# Patient Record
Sex: MALE | Race: WHITE | NOT HISPANIC OR LATINO | Employment: FULL TIME | URBAN - METROPOLITAN AREA
[De-identification: names, ages, dates, MRNs, and addresses within clinical notes are randomized per-mention and may not be internally consistent; named-entity substitution may affect disease eponyms.]

---

## 2017-03-21 ENCOUNTER — GENERIC CONVERSION - ENCOUNTER (OUTPATIENT)
Dept: OTHER | Facility: OTHER | Age: 42
End: 2017-03-21

## 2017-10-02 ENCOUNTER — ALLSCRIPTS OFFICE VISIT (OUTPATIENT)
Dept: OTHER | Facility: OTHER | Age: 42
End: 2017-10-02

## 2017-10-03 NOTE — PROGRESS NOTES
Assessment  1  MDD (major depressive disorder) (296 20) (F32 9)    Plan  MDD (major depressive disorder)    · BusPIRone HCl - 15 MG Oral Tablet; Take 1 tablet daily   · Escitalopram Oxalate 10 MG Oral Tablet; Take 1 tablet daily   · Dolores Sorto LCSW (Licensed Clinical ) Co-Management  *  Status: Hold  For - Scheduling  Requested for: 74LVH6209  Care Summary provided  : Yes    Discussion/Summary    Will start lexapro and buspar interacts with trazodone and patient does not want to stop that  cxr at next visit  in 1 month  Chief Complaint  1  Depression  In deep Depression      History of Present Illness  HPI: has been having worsening depressionpast few eyars -- worse over past 6 monthshas been involved with chest tightness  on citalopram and buspar in the past which had helped  Review of Systems    Constitutional: no fever or chills, feels well, no tiredness, no recent weight loss or weight gain  ENT: no complaints of earache, no loss of hearing, no nosebleeds or nasal discharge, no sore throat or hoarseness  Cardiovascular: no complaints of slow or fast heart rate, no chest pain, no palpitations, no leg claudication or lower extremity edema  Respiratory: as noted in HPI  Gastrointestinal: no complaints of abdominal pain, no constipation, no nausea or vomiting, no diarrhea or bloody stools  Genitourinary: no complaints of dysuria or incontinence, no hesitancy, no nocturia, no genital lesion, no inadequacy of penile erection  Musculoskeletal: no complaints of arthralgia, no myalgia, no joint swelling or stiffness, no limb pain or swelling  Integumentary: no complaints of skin rash or lesion, no itching or dry skin, no skin wounds  Neurological: no complaints of headache, no confusion, no numbness or tingling, no dizziness or fainting  Active Problems  1  Adjustment disorder (309 9) (F43 20)   2  Bulging lumbar disc (722 10) (M51 26)   3   Chronic tension-type headache (339 12) (G44 229)   4  Grief reaction (309 0) (F43 20)   5  Hyperlipidemia (272 4) (E78 5)   6  Hypertension (401 9) (I10)   7  Insomnia (780 52) (G47 00)   8  Lateral epicondylitis of right elbow (726 32) (M77 11)   9  Myofascial pain syndrome (729 1) (M79 1)   10  Need for diphtheria-tetanus-pertussis (Tdap) vaccine (V06 1) (Z23)    Past Medical History  1  History of Herniated nucleus pulposus, L5-S1, right (722 10) (M51 27)   2  History of actinic keratosis (V13 3) (Z87 2)   3  History of low back pain (V13 59) (Z87 39)  Active Problems And Past Medical History Reviewed: The active problems and past medical history were reviewed and updated today  Family History  Mother    1  Family history of hypertension (V17 49) (Z82 49)  Brother    2  Family history of CHD (coronary heart disease)  Family History    3  Family history of Hypertension (V17 49)   4  Family history of Thyroid Cancer  Family History Reviewed: The family history was reviewed and updated today  Social History   · Being A Social Drinker   · Inadequate exercise (V69 0) (Z72 3)   · Marital History - Single   · Never A Smoker   · Working Full Time  The social history was reviewed and updated today  The social history was reviewed and is unchanged  Surgical History  Surgical History Reviewed: The surgical history was reviewed and updated today  Current Meds   1  ALPRAZolam 0 5 MG Oral Tablet; take 1-2 tabs as needed  twice daily; Therapy: 40Xfq7613 to (Last Rx:90Lkx5902) Ordered   2  Fenofibrate 145 MG Oral Tablet; Take 1 tablet daily; Therapy: 32Zut1541 to (Last Rx:14Yrw6416)  Requested for: 40Sku9406 Ordered   3  Lisinopril 20 MG Oral Tablet; TAKE 1 TABLET DAILY FOR BLOOD PRESSURE; Therapy: 16SJD5673 to (Evaluate:13Nov2017)  Requested for: 76PTF6807; Last   Rx:25Xrt7784 Ordered   4  TraMADol HCl - 50 MG Oral Tablet; TAKE 2 TABLETS 4 TIMES DAILY;    Therapy: 45RUJ2686 to (Evaluate:54Ppr0246)  Requested for: 25HDG1665; Last   E25MWQ0725 Ordered   5  TraZODone HCl - 50 MG Oral Tablet; two tablets at bedtime  Requested for: 94RFR5753;   Last Rx:75Mxi3655 Ordered   6  Zolpidem Tartrate 10 MG Oral Tablet; TAKE 1 TABLET AT BEDTIME AS NEEDED FOR   SLEEP; Therapy: 46IVN3214 to ([address])  Requested for: 08PHF8785; Last   Rx:61Tzf2150 Ordered    The medication list was reviewed and updated today  Allergies  1  No Known Drug Allergies    Vitals   Recorded: 89XZL7927 05:06PM   Heart Rate 60, R Radial   Pulse Quality Regular, R Radial   Systolic 144   Diastolic 96   Height 6 ft 6 in   Weight 262 lb    BMI Calculated 30 28   BSA Calculated 2 53     Physical Exam    Constitutional   General appearance: No acute distress, well appearing and well nourished  Ears, Nose, Mouth, and Throat   Otoscopic examination: Tympanic membrance translucent with normal light reflex  Canals patent without erythema  Oropharynx: Normal with no erythema, edema, exudate or lesions  Pulmonary   Respiratory effort: No increased work of breathing or signs of respiratory distress  Auscultation of lungs: Clear to auscultation, equal breath sounds bilaterally, no wheezes, no rales, no rhonci  Cardiovascular   Auscultation of heart: Normal rate and rhythm, normal S1 and S2, without murmurs  Abdomen   Abdomen: Non-tender, no masses  Liver and spleen: No hepatomegaly or splenomegaly  Lymphatic   Palpation of lymph nodes in neck: No lymphadenopathy  Musculoskeletal   Gait and station: Normal     Digits and nails: Normal without clubbing or cyanosis  Inspection/palpation of joints, bones, and muscles: Normal     Skin   Skin and subcutaneous tissue: Normal without rashes or lesions  Neurologic   Cranial nerves: Cranial nerves 2-12 intact      Psychiatric   Orientation to person, place and time: Normal     Mood and affect: Normal          Future Appointments    Date/Time Provider Specialty Site   2017 05:45 PM Paty Javed  Family Medicine [facility]     Signatures   Electronically signed by : Michael Cotto DO; Oct  2 2017  7:08PM EST                       (Author)

## 2017-10-05 ENCOUNTER — GENERIC CONVERSION - ENCOUNTER (OUTPATIENT)
Dept: OTHER | Facility: OTHER | Age: 42
End: 2017-10-05

## 2017-10-05 ENCOUNTER — ALLSCRIPTS OFFICE VISIT (OUTPATIENT)
Dept: OTHER | Facility: OTHER | Age: 42
End: 2017-10-05

## 2017-10-11 ENCOUNTER — GENERIC CONVERSION - ENCOUNTER (OUTPATIENT)
Dept: OTHER | Facility: OTHER | Age: 42
End: 2017-10-11

## 2017-10-11 ENCOUNTER — ALLSCRIPTS OFFICE VISIT (OUTPATIENT)
Dept: PSYCHOLOGY | Facility: CLINIC | Age: 42
End: 2017-10-11
Payer: COMMERCIAL

## 2017-10-11 PROCEDURE — G0176 OPPS/PHP;ACTIVITY THERAPY: HCPCS | Performed by: PSYCHIATRY & NEUROLOGY

## 2017-10-11 PROCEDURE — G0177 OPPS/PHP; TRAIN & EDUC SERV: HCPCS | Performed by: PSYCHIATRY & NEUROLOGY

## 2017-10-11 PROCEDURE — 90791 PSYCH DIAGNOSTIC EVALUATION: CPT | Performed by: PSYCHIATRY & NEUROLOGY

## 2017-10-11 PROCEDURE — G0410 GRP PSYCH PARTIAL HOSP 45-50: HCPCS | Performed by: PSYCHIATRY & NEUROLOGY

## 2017-10-12 ENCOUNTER — APPOINTMENT (OUTPATIENT)
Dept: PSYCHOLOGY | Facility: CLINIC | Age: 42
End: 2017-10-12
Payer: COMMERCIAL

## 2017-10-12 ENCOUNTER — GENERIC CONVERSION - ENCOUNTER (OUTPATIENT)
Dept: OTHER | Facility: OTHER | Age: 42
End: 2017-10-12

## 2017-10-12 PROCEDURE — G0176 OPPS/PHP;ACTIVITY THERAPY: HCPCS | Performed by: PSYCHIATRY & NEUROLOGY

## 2017-10-12 PROCEDURE — G0410 GRP PSYCH PARTIAL HOSP 45-50: HCPCS | Performed by: PSYCHIATRY & NEUROLOGY

## 2017-10-12 PROCEDURE — G0177 OPPS/PHP; TRAIN & EDUC SERV: HCPCS | Performed by: PSYCHIATRY & NEUROLOGY

## 2017-10-13 ENCOUNTER — APPOINTMENT (OUTPATIENT)
Dept: PSYCHOLOGY | Facility: CLINIC | Age: 42
End: 2017-10-13
Payer: COMMERCIAL

## 2017-10-13 ENCOUNTER — GENERIC CONVERSION - ENCOUNTER (OUTPATIENT)
Dept: OTHER | Facility: OTHER | Age: 42
End: 2017-10-13

## 2017-10-13 PROCEDURE — G0176 OPPS/PHP;ACTIVITY THERAPY: HCPCS | Performed by: PSYCHIATRY & NEUROLOGY

## 2017-10-13 PROCEDURE — G0177 OPPS/PHP; TRAIN & EDUC SERV: HCPCS | Performed by: PSYCHIATRY & NEUROLOGY

## 2017-10-13 PROCEDURE — G0410 GRP PSYCH PARTIAL HOSP 45-50: HCPCS | Performed by: PSYCHIATRY & NEUROLOGY

## 2017-10-14 ENCOUNTER — TRANSCRIBE ORDERS (OUTPATIENT)
Dept: LAB | Facility: CLINIC | Age: 42
End: 2017-10-14

## 2017-10-14 ENCOUNTER — APPOINTMENT (OUTPATIENT)
Dept: LAB | Facility: CLINIC | Age: 42
End: 2017-10-14
Payer: COMMERCIAL

## 2017-10-14 DIAGNOSIS — Z79.899 NEED FOR PROPHYLACTIC CHEMOTHERAPY: Primary | ICD-10-CM

## 2017-10-14 DIAGNOSIS — Z79.899 NEED FOR PROPHYLACTIC CHEMOTHERAPY: ICD-10-CM

## 2017-10-14 LAB
ALBUMIN SERPL BCP-MCNC: 4.2 G/DL (ref 3.5–5)
ALP SERPL-CCNC: 44 U/L (ref 46–116)
ALT SERPL W P-5'-P-CCNC: 33 U/L (ref 12–78)
ANION GAP SERPL CALCULATED.3IONS-SCNC: 5 MMOL/L (ref 4–13)
AST SERPL W P-5'-P-CCNC: 17 U/L (ref 5–45)
BILIRUB SERPL-MCNC: 0.44 MG/DL (ref 0.2–1)
BUN SERPL-MCNC: 13 MG/DL (ref 5–25)
CALCIUM SERPL-MCNC: 9 MG/DL (ref 8.3–10.1)
CHLORIDE SERPL-SCNC: 103 MMOL/L (ref 100–108)
CHOLEST SERPL-MCNC: 170 MG/DL (ref 50–200)
CO2 SERPL-SCNC: 27 MMOL/L (ref 21–32)
CREAT SERPL-MCNC: 1.28 MG/DL (ref 0.6–1.3)
GFR SERPL CREATININE-BSD FRML MDRD: 68 ML/MIN/1.73SQ M
GLUCOSE P FAST SERPL-MCNC: 99 MG/DL (ref 65–99)
HDLC SERPL-MCNC: 40 MG/DL (ref 40–60)
LDLC SERPL CALC-MCNC: 86 MG/DL (ref 0–100)
POTASSIUM SERPL-SCNC: 4.1 MMOL/L (ref 3.5–5.3)
PROT SERPL-MCNC: 7.8 G/DL (ref 6.4–8.2)
SODIUM SERPL-SCNC: 135 MMOL/L (ref 136–145)
TRIGL SERPL-MCNC: 222 MG/DL

## 2017-10-14 PROCEDURE — 80053 COMPREHEN METABOLIC PANEL: CPT

## 2017-10-14 PROCEDURE — 80061 LIPID PANEL: CPT

## 2017-10-14 PROCEDURE — 36415 COLL VENOUS BLD VENIPUNCTURE: CPT

## 2017-10-16 ENCOUNTER — GENERIC CONVERSION - ENCOUNTER (OUTPATIENT)
Dept: OTHER | Facility: OTHER | Age: 42
End: 2017-10-16

## 2017-10-16 ENCOUNTER — APPOINTMENT (OUTPATIENT)
Dept: PSYCHOLOGY | Facility: CLINIC | Age: 42
End: 2017-10-16
Payer: COMMERCIAL

## 2017-10-16 PROCEDURE — G0177 OPPS/PHP; TRAIN & EDUC SERV: HCPCS | Performed by: PSYCHIATRY & NEUROLOGY

## 2017-10-16 PROCEDURE — G0410 GRP PSYCH PARTIAL HOSP 45-50: HCPCS | Performed by: PSYCHIATRY & NEUROLOGY

## 2017-10-16 PROCEDURE — G0176 OPPS/PHP;ACTIVITY THERAPY: HCPCS | Performed by: PSYCHIATRY & NEUROLOGY

## 2017-10-17 ENCOUNTER — GENERIC CONVERSION - ENCOUNTER (OUTPATIENT)
Dept: OTHER | Facility: OTHER | Age: 42
End: 2017-10-17

## 2017-10-17 ENCOUNTER — APPOINTMENT (OUTPATIENT)
Dept: PSYCHOLOGY | Facility: CLINIC | Age: 42
End: 2017-10-17
Payer: COMMERCIAL

## 2017-10-17 PROCEDURE — G0410 GRP PSYCH PARTIAL HOSP 45-50: HCPCS | Performed by: PSYCHIATRY & NEUROLOGY

## 2017-10-17 PROCEDURE — G0177 OPPS/PHP; TRAIN & EDUC SERV: HCPCS | Performed by: PSYCHIATRY & NEUROLOGY

## 2017-10-17 PROCEDURE — G0176 OPPS/PHP;ACTIVITY THERAPY: HCPCS | Performed by: PSYCHIATRY & NEUROLOGY

## 2017-10-18 ENCOUNTER — GENERIC CONVERSION - ENCOUNTER (OUTPATIENT)
Dept: OTHER | Facility: OTHER | Age: 42
End: 2017-10-18

## 2017-10-18 ENCOUNTER — APPOINTMENT (OUTPATIENT)
Dept: PSYCHOLOGY | Facility: CLINIC | Age: 42
End: 2017-10-18
Payer: COMMERCIAL

## 2017-10-18 PROCEDURE — G0177 OPPS/PHP; TRAIN & EDUC SERV: HCPCS | Performed by: PSYCHIATRY & NEUROLOGY

## 2017-10-18 PROCEDURE — G0410 GRP PSYCH PARTIAL HOSP 45-50: HCPCS | Performed by: PSYCHIATRY & NEUROLOGY

## 2017-10-18 PROCEDURE — G0176 OPPS/PHP;ACTIVITY THERAPY: HCPCS | Performed by: PSYCHIATRY & NEUROLOGY

## 2017-10-19 ENCOUNTER — APPOINTMENT (OUTPATIENT)
Dept: PSYCHOLOGY | Facility: CLINIC | Age: 42
End: 2017-10-19
Payer: COMMERCIAL

## 2017-10-19 ENCOUNTER — GENERIC CONVERSION - ENCOUNTER (OUTPATIENT)
Dept: OTHER | Facility: OTHER | Age: 42
End: 2017-10-19

## 2017-10-19 PROCEDURE — G0410 GRP PSYCH PARTIAL HOSP 45-50: HCPCS | Performed by: PSYCHIATRY & NEUROLOGY

## 2017-10-19 PROCEDURE — G0177 OPPS/PHP; TRAIN & EDUC SERV: HCPCS | Performed by: PSYCHIATRY & NEUROLOGY

## 2017-10-19 PROCEDURE — G0176 OPPS/PHP;ACTIVITY THERAPY: HCPCS | Performed by: PSYCHIATRY & NEUROLOGY

## 2017-10-20 ENCOUNTER — GENERIC CONVERSION - ENCOUNTER (OUTPATIENT)
Dept: OTHER | Facility: OTHER | Age: 42
End: 2017-10-20

## 2017-10-20 ENCOUNTER — APPOINTMENT (OUTPATIENT)
Dept: PSYCHOLOGY | Facility: CLINIC | Age: 42
End: 2017-10-20
Payer: COMMERCIAL

## 2017-10-20 PROCEDURE — G0177 OPPS/PHP; TRAIN & EDUC SERV: HCPCS | Performed by: PSYCHIATRY & NEUROLOGY

## 2017-10-20 PROCEDURE — G0410 GRP PSYCH PARTIAL HOSP 45-50: HCPCS | Performed by: PSYCHIATRY & NEUROLOGY

## 2017-10-20 PROCEDURE — G0176 OPPS/PHP;ACTIVITY THERAPY: HCPCS | Performed by: PSYCHIATRY & NEUROLOGY

## 2017-10-23 ENCOUNTER — GENERIC CONVERSION - ENCOUNTER (OUTPATIENT)
Dept: OTHER | Facility: OTHER | Age: 42
End: 2017-10-23

## 2017-10-23 ENCOUNTER — APPOINTMENT (OUTPATIENT)
Dept: PSYCHOLOGY | Facility: CLINIC | Age: 42
End: 2017-10-23
Payer: COMMERCIAL

## 2017-10-23 PROCEDURE — G0176 OPPS/PHP;ACTIVITY THERAPY: HCPCS | Performed by: PSYCHIATRY & NEUROLOGY

## 2017-10-23 PROCEDURE — G0410 GRP PSYCH PARTIAL HOSP 45-50: HCPCS | Performed by: PSYCHIATRY & NEUROLOGY

## 2017-10-23 PROCEDURE — G0177 OPPS/PHP; TRAIN & EDUC SERV: HCPCS | Performed by: PSYCHIATRY & NEUROLOGY

## 2017-10-24 ENCOUNTER — GENERIC CONVERSION - ENCOUNTER (OUTPATIENT)
Dept: OTHER | Facility: OTHER | Age: 42
End: 2017-10-24

## 2017-10-24 ENCOUNTER — APPOINTMENT (OUTPATIENT)
Dept: PSYCHOLOGY | Facility: CLINIC | Age: 42
End: 2017-10-24
Payer: COMMERCIAL

## 2017-10-24 PROCEDURE — G0176 OPPS/PHP;ACTIVITY THERAPY: HCPCS | Performed by: PSYCHIATRY & NEUROLOGY

## 2017-10-24 PROCEDURE — G0410 GRP PSYCH PARTIAL HOSP 45-50: HCPCS | Performed by: PSYCHIATRY & NEUROLOGY

## 2017-10-24 PROCEDURE — G0177 OPPS/PHP; TRAIN & EDUC SERV: HCPCS | Performed by: PSYCHIATRY & NEUROLOGY

## 2017-10-25 ENCOUNTER — APPOINTMENT (OUTPATIENT)
Dept: PSYCHOLOGY | Facility: CLINIC | Age: 42
End: 2017-10-25
Payer: COMMERCIAL

## 2017-10-25 ENCOUNTER — GENERIC CONVERSION - ENCOUNTER (OUTPATIENT)
Dept: OTHER | Facility: OTHER | Age: 42
End: 2017-10-25

## 2017-10-25 PROCEDURE — S9480 INTENSIVE OUTPATIENT PSYCHIA: HCPCS | Performed by: PSYCHIATRY & NEUROLOGY

## 2017-10-26 ENCOUNTER — GENERIC CONVERSION - ENCOUNTER (OUTPATIENT)
Dept: OTHER | Facility: OTHER | Age: 42
End: 2017-10-26

## 2017-10-27 ENCOUNTER — GENERIC CONVERSION - ENCOUNTER (OUTPATIENT)
Dept: OTHER | Facility: OTHER | Age: 42
End: 2017-10-27

## 2017-10-30 ENCOUNTER — APPOINTMENT (OUTPATIENT)
Dept: PSYCHOLOGY | Facility: CLINIC | Age: 42
End: 2017-10-30
Payer: COMMERCIAL

## 2017-10-30 ENCOUNTER — GENERIC CONVERSION - ENCOUNTER (OUTPATIENT)
Dept: OTHER | Facility: OTHER | Age: 42
End: 2017-10-30

## 2017-10-30 PROCEDURE — S9480 INTENSIVE OUTPATIENT PSYCHIA: HCPCS | Performed by: PSYCHIATRY & NEUROLOGY

## 2017-10-31 ENCOUNTER — GENERIC CONVERSION - ENCOUNTER (OUTPATIENT)
Dept: OTHER | Facility: OTHER | Age: 42
End: 2017-10-31

## 2017-11-01 ENCOUNTER — APPOINTMENT (OUTPATIENT)
Dept: PSYCHOLOGY | Facility: CLINIC | Age: 42
End: 2017-11-01
Payer: COMMERCIAL

## 2017-11-01 ENCOUNTER — GENERIC CONVERSION - ENCOUNTER (OUTPATIENT)
Dept: OTHER | Facility: OTHER | Age: 42
End: 2017-11-01

## 2017-11-01 PROCEDURE — S9480 INTENSIVE OUTPATIENT PSYCHIA: HCPCS | Performed by: PSYCHIATRY & NEUROLOGY

## 2017-11-02 ENCOUNTER — GENERIC CONVERSION - ENCOUNTER (OUTPATIENT)
Dept: OTHER | Facility: OTHER | Age: 42
End: 2017-11-02

## 2017-11-03 ENCOUNTER — GENERIC CONVERSION - ENCOUNTER (OUTPATIENT)
Dept: OTHER | Facility: OTHER | Age: 42
End: 2017-11-03

## 2017-11-06 ENCOUNTER — ALLSCRIPTS OFFICE VISIT (OUTPATIENT)
Dept: OTHER | Facility: OTHER | Age: 42
End: 2017-11-06

## 2017-11-06 ENCOUNTER — GENERIC CONVERSION - ENCOUNTER (OUTPATIENT)
Dept: OTHER | Facility: OTHER | Age: 42
End: 2017-11-06

## 2017-11-07 ENCOUNTER — APPOINTMENT (OUTPATIENT)
Dept: PSYCHOLOGY | Facility: CLINIC | Age: 42
End: 2017-11-07
Payer: COMMERCIAL

## 2017-11-07 ENCOUNTER — GENERIC CONVERSION - ENCOUNTER (OUTPATIENT)
Dept: OTHER | Facility: OTHER | Age: 42
End: 2017-11-07

## 2017-11-07 PROCEDURE — S9480 INTENSIVE OUTPATIENT PSYCHIA: HCPCS | Performed by: PSYCHIATRY & NEUROLOGY

## 2017-11-07 NOTE — PROGRESS NOTES
Assessment  1  Bulging lumbar disc (722 10) (M51 26)   2  Major depressive disorder, recurrent, severe w/o psychotic behavior (296 33) (F33 2)   3  Hypertension (401 9) (I10)    Plan  Encounter for pre-operative cardiovascular clearance    · EKG/ECG- POC; Status:Complete;   Done: 37ULX6574 06:49PM  Hyperlipidemia    · Fenofibrate 145 MG Oral Tablet; Take 1 tablet daily  Hypertension    · Lisinopril 20 MG Oral Tablet; TAKE 1 TABLET DAILY FOR BLOOD PRESSURE  Insomnia    · TraZODone HCl - 50 MG Oral Tablet; two tablets at bedtime  Major depressive disorder, recurrent, severe w/o psychotic behavior    · BusPIRone HCl - 15 MG Oral Tablet; Take 1 tablet daily   · Escitalopram Oxalate 10 MG Oral Tablet; Take 1 tablet daily  PMH: History of low back pain    · TraMADol HCl - 50 MG Oral Tablet; Take 1 tablet 4 times daily    Discussion/Summary    Cleared for surgerycont current meds --without changesneed to consider sleep study after back pain  Chief Complaint  1 month follow up for depression   Patient is here today for follow up of chronic conditions described in HPI  History of Present Illness  here for f/u of depressionsomewhat [facility] psych appt till feb 2018less emotional and flat but able to functionback surgery in a few weeks his back pain is not helpinghere for pre-op clearance      Review of Systems    Constitutional: No fever or chills, feels well, no tiredness, no recent weight gain or weight loss  Eyes: No complaints of eye pain, no red eyes, no discharge from eyes, no itchy eyes  ENT: no complaints of earache, no hearing loss, no nosebleeds, no nasal discharge, no sore throat, no hoarseness  Cardiovascular: No complaints of slow heart rate, no fast heart rate, no chest pain, no palpitations, no leg claudication, no lower extremity  Respiratory: No complaints of shortness of breath, no wheezing, no cough, no SOB on exertion, no orthopnea or PND     Gastrointestinal: No complaints of abdominal pain, no constipation, no nausea or vomiting, no diarrhea or bloody stools  Genitourinary: No complaints of dysuria, no incontinence, no hesitancy, no nocturia, no genital lesion, no testicular pain  Musculoskeletal: as noted in HPI  Integumentary: No complaints of skin rash or skin lesions, no itching, no skin wound, no dry skin  Neurological: No compliants of headache, no confusion, no convulsions, no numbness or tingling, no dizziness or fainting, no limb weakness, no difficulty walking  Psychiatric: Is not suicidal, no sleep disturbances, no anxiety or depression, no change in personality, no emotional problems  Endocrine: No complaints of proptosis, no hot flashes, no muscle weakness, no erectile dysfunction, no deepening of the voice, no feelings of weakness  Hematologic/Lymphatic: No complaints of swollen glands, no swollen glands in the neck, does not bleed easily, no easy bruising  Active Problems  1  Bulging lumbar disc (722 10) (M51 26)   2  Chronic tension-type headache (339 12) (G44 229)   3  Generalized anxiety disorder (300 02) (F41 1)   4  Grief reaction (309 0) (F43 20)   5  High risk medication use (V58 69) (Z79 899)   6  Hyperlipidemia (272 4) (E78 5)   7  Hypertension (401 9) (I10)   8  Insomnia (780 52) (G47 00)   9  Lateral epicondylitis of right elbow (726 32) (M77 11)   10  Major depressive disorder, recurrent, severe w/o psychotic behavior (296 33) (F33 2)   11  Myofascial pain syndrome (729 1) (M79 1)    Past Medical History  1  History of Herniated nucleus pulposus, L5-S1, right (722 10) (M51 27)   2  History of actinic keratosis (V13 3) (Z87 2)   3  History of low back pain (V13 59) (Z87 39)   4  Denied: History of seizure   5  Denied: History of traumatic injury of head    The active problems and past medical history were reviewed and updated today  Surgical History    The surgical history was reviewed and updated today  Family History  Mother    1   Family history of hypertension (V17 49) (Z82 49)  Sister    2  Family history of Bipolar 1 disorder  Brother    3  Family history of CHD (coronary heart disease)  Family History    4  Family history of Hypertension (V17 49)   5  Family history of Thyroid Cancer    The family history was reviewed and updated today  Social History   · Being A Social Drinker   · Daily caffeine consumption   · Inadequate exercise (V69 0) (Z72 3)   ·    · Never A Smoker   · Working Full Time  The social history was reviewed and updated today  The social history was reviewed and is unchanged  Current Meds   1  ARIPiprazole 5 MG Oral Tablet; TAKE 1 TABLET DAILY; Therapy: 88ULK2074 to (Evaluate:10Nov2017)  Requested for: 60YWL2452; Last   Rx:11Oct2017 Ordered   2  BusPIRone HCl - 15 MG Oral Tablet; Take 1 tablet daily; Therapy: 63CWF3637 to (Lurdes Felt)  Requested for: 95RHN1862; Last   Rx:02Oct2017 Ordered   3  Escitalopram Oxalate 10 MG Oral Tablet; Take 1 tablet daily; Therapy: 60GDJ7756 to (Lurdes Felt)  Requested for: 02Oct2017; Last   Rx:02Oct2017 Ordered   4  Fenofibrate 145 MG Oral Tablet; Take 1 tablet daily; Therapy: 25Igf9355 to (Last Rx:65Omf0617)  Requested for: 88Xoc4561 Ordered   5  Lisinopril 20 MG Oral Tablet; TAKE 1 TABLET DAILY FOR BLOOD PRESSURE; Therapy: 60SDK3336 to (Evaluate:13Nov2017)  Requested for: 42YTF3864; Last   Rx:29Zad1970 Ordered   6  TraMADol HCl - 50 MG Oral Tablet; Take 1 tablet 4 times daily; Therapy: 60ZDV5627 to (Heck Blacknuvia)  Requested for: 47TND9505; Last   Rx:10Oct2017 Ordered   7  TraZODone HCl - 50 MG Oral Tablet; two tablets at bedtime  Requested for: 22NQE5120;   Last Rx:03Fri9677 Ordered   8  Zolpidem Tartrate 10 MG Oral Tablet; TAKE 1 TABLET AT BEDTIME; Therapy: 52NIV2681 to (Evaluate:54Oiy9546); Last Rx:10Oct2017 Ordered    The medication list was reviewed and updated today  Allergies  1   No Known Drug Allergies    Vitals  Vital Signs Recorded: 04AQC5275 05:49PM   Temperature 98 2 F, Tympanic   Heart Rate 66   Respiration 17   Systolic 020, RUE, Sitting   Diastolic 70, RUE, Sitting   BP CUFF SIZE Large   Height 6 ft 6 in   Weight 260 lb    BMI Calculated 30 05   BSA Calculated 2 52     Physical Exam    Constitutional   General appearance: No acute distress, well appearing and well nourished  Ears, Nose, Mouth, and Throat   Otoscopic examination: Tympanic membrance translucent with normal light reflex  Canals patent without erythema  Oropharynx: Normal with no erythema, edema, exudate or lesions  Pulmonary   Respiratory effort: No increased work of breathing or signs of respiratory distress  Auscultation of lungs: Clear to auscultation, equal breath sounds bilaterally, no wheezes, no rales, no rhonci  Cardiovascular   Auscultation of heart: Normal rate and rhythm, normal S1 and S2, without murmurs  Carotid pulses: Normal     Abdomen   Abdomen: Non-tender, no masses  Liver and spleen: No hepatomegaly or splenomegaly  Lymphatic   Palpation of lymph nodes in neck: No lymphadenopathy  Musculoskeletal   Gait and station: Normal     Digits and nails: Normal without clubbing or cyanosis  Inspection/palpation of joints, bones, and muscles: Normal     Skin   Skin and subcutaneous tissue: Normal without rashes or lesions  Neurologic   Cranial nerves: Cranial nerves 2-12 intact  Sensation: No sensory loss  Psychiatric   Orientation to person, place and time: Normal          Results/Data  EKG/ECG- POC 15GSL7526 06:49PM Grzegorz Ashford     Test Name Result Flag Reference   EKG/ECG 11/06/2017       Summary / No summary entered :      No summary entered  Documents attached :      Leonie Mcfarland ECG - Jaylen Smith [facility]; Enc: [id number] - Appointment - Grzegorz Ashford - (Family      Medicine) (Result Document)    Future Appointments    Date/Time Provider Specialty Site   11/07/2017 12:15 PM Jose Juan Rodriges M D   [facility]   02/21/2018 10:00 AM Mariama Sanchez DO Psychiatry [facility]   01/08/2018 03:30 PM Laura Dennis DO Family Medicine [facility]     Signatures   Electronically signed by : Jovany Shepherd DO; Nov 6 2017  8:56PM EST                       (Author)

## 2017-11-08 ENCOUNTER — GENERIC CONVERSION - ENCOUNTER (OUTPATIENT)
Dept: OTHER | Facility: OTHER | Age: 42
End: 2017-11-08

## 2017-11-09 ENCOUNTER — GENERIC CONVERSION - ENCOUNTER (OUTPATIENT)
Dept: OTHER | Facility: OTHER | Age: 42
End: 2017-11-09

## 2017-11-09 ENCOUNTER — APPOINTMENT (OUTPATIENT)
Dept: PSYCHOLOGY | Facility: CLINIC | Age: 42
End: 2017-11-09
Payer: COMMERCIAL

## 2017-11-09 PROCEDURE — S9480 INTENSIVE OUTPATIENT PSYCHIA: HCPCS | Performed by: PSYCHIATRY & NEUROLOGY

## 2017-12-06 ENCOUNTER — ALLSCRIPTS OFFICE VISIT (OUTPATIENT)
Dept: OTHER | Facility: OTHER | Age: 42
End: 2017-12-06

## 2017-12-13 ENCOUNTER — APPOINTMENT (OUTPATIENT)
Dept: PHYSICAL THERAPY | Facility: CLINIC | Age: 42
End: 2017-12-13
Payer: COMMERCIAL

## 2017-12-13 PROCEDURE — 97161 PT EVAL LOW COMPLEX 20 MIN: CPT

## 2017-12-13 PROCEDURE — G8991 OTHER PT/OT GOAL STATUS: HCPCS

## 2017-12-13 PROCEDURE — 97110 THERAPEUTIC EXERCISES: CPT

## 2017-12-13 PROCEDURE — G8990 OTHER PT/OT CURRENT STATUS: HCPCS

## 2017-12-14 ENCOUNTER — ALLSCRIPTS OFFICE VISIT (OUTPATIENT)
Dept: OTHER | Facility: OTHER | Age: 42
End: 2017-12-14

## 2017-12-19 ENCOUNTER — APPOINTMENT (OUTPATIENT)
Dept: PHYSICAL THERAPY | Facility: CLINIC | Age: 42
End: 2017-12-19
Payer: COMMERCIAL

## 2017-12-19 ENCOUNTER — GENERIC CONVERSION - ENCOUNTER (OUTPATIENT)
Dept: FAMILY MEDICINE CLINIC | Facility: CLINIC | Age: 42
End: 2017-12-19

## 2017-12-21 ENCOUNTER — APPOINTMENT (OUTPATIENT)
Dept: PHYSICAL THERAPY | Facility: CLINIC | Age: 42
End: 2017-12-21
Payer: COMMERCIAL

## 2017-12-21 PROCEDURE — 97110 THERAPEUTIC EXERCISES: CPT

## 2017-12-21 PROCEDURE — 97140 MANUAL THERAPY 1/> REGIONS: CPT

## 2017-12-26 ENCOUNTER — APPOINTMENT (OUTPATIENT)
Dept: PHYSICAL THERAPY | Facility: CLINIC | Age: 42
End: 2017-12-26
Payer: COMMERCIAL

## 2018-01-08 ENCOUNTER — ALLSCRIPTS OFFICE VISIT (OUTPATIENT)
Dept: OTHER | Facility: OTHER | Age: 43
End: 2018-01-08

## 2018-01-09 NOTE — PSYCH
History of Present Illness  [facility] Clinical Progress Note [facility]:   Specialized Services Documentation - Therapist must complete separate progress note for each specific clinical activity in which the client participated during the day  (601) Group Psychotherapy: 5799-2857  Tyree Toth participated in psychotherapy group that focused on creating positive life changes and how to move forward  Tyree Toth actively engaged in group discussion, attentive and supportive to peers  Tyree Toth discussed an overwhelming feeling when trying to make life changes, comparing it to a "car engine" and "tire blowout " He discussed his awareness with the need for change and struggling motivation to do so  Mild progress made towards goals  Continue psychotherapy group to encourage Tyree Toth to explore positive ways of coping  Grayson Lunsford MSW Intern  Treatment Plan Problem(s): 1 1, 1 2, 1 4  [facility]     (898) Group Psychotherapy: 0576-3540 Tyree Toth actively shared in psychotherapy group focused on challenging ineffective perceptions  He engaged in painting task, bert analysis, and discussion  Group was introduced to [facility]  Group explored balancing dialectics to understand if a perception is helpful or hurtful  Tyree Toth was able to identify beliefs as something that changes our perception  Some progress towards goal observed and shared  Continue psychotherapy to further challenge hurtful perceptions  Carlos Manuel Males, MTI  Treatment Plan Problem(s): 1 1, 1 2  Doyce Clubs, [facility]       (554) Education Therapy Goals set - Relax    Treatment Plan Problem(s): 1 1, 1 2  Education Therapy Time - 0900 - 0930 Previous goal was met  Readiness to Learning:  He is receptive to learning  There are  no barriers to learning  Learning Assessment Time - 1330 - 1400   Education completed on  illness and wellness tools  The teaching method was  verbal, written and demonstration   Shared area of learning: Yes     CHAYA Choe MT-BC     (938) Allied Therapy 5810-7119 Boy Szymanski actively shared in [facility] group focused on managing moods  He engaged in bert analysis and instrument improvisation  Group explored ISO principle on how to use music to help manage moods  Group also explored different ways to go from an ineffective mood to an effective middle ground  Boy Szymanski shared that when he is feeling low and in an ineffective mood, he tends to move up high too quickly  Using the mantra âslow up, slow downâ was discussed  Some good progress towards goal observed and shared  Continue AT to further encourage the exploration of managing moods to promote recovery  CHAYA Choe  Treatment Plan Problem(s): 1 1, 1 2  JOVANY Ch       Case Management Note:   [id number] Met with Boy Szymanski  Reviewed paperwork which will be completed and faxed by tomorrow  He reports continued distress at home with his nephew - discussed limits and boundary settings  He recognizes what he may need to do yet struggles with guilt  Discussed self-care  TREATMENT SESSION NUMBER: 5   Current suicide risk is low  Medications not changed/added/denied  JOVANY Ch      Active Problems    1  Bulging lumbar disc (722 10) (M51 26)   2  Chronic tension-type headache (339 12) (G44 229)   3  Generalized anxiety disorder (300 02) (F41 1)   4  Grief reaction (309 0) (F43 20)   5  High risk medication use (V58 69) (Z79 899)   6  Hyperlipidemia (272 4) (E78 5)   7  Hypertension (401 9) (I10)   8  Insomnia (780 52) (G47 00)   9  Lateral epicondylitis of right elbow (726 32) (M77 11)   10  Major depressive disorder, recurrent, severe w/o psychotic behavior (296 33) (F33 2)   11  Myofascial pain syndrome (729 1) (M79 1)   12  Need for diphtheria-tetanus-pertussis (Tdap) vaccine (V06 1) (Z23)    Past Medical History    1  History of Herniated nucleus pulposus, L5-S1, right (722 10) (M51 27)   2   History of actinic keratosis (V13 3) (Z87 2)   3  History of low back pain (V13 59) (Z87 39)   4  Denied: History of seizure   5  Denied: History of traumatic injury of head    Allergies    1  No Known Drug Allergies    Current Meds   1  Abilify 5 MG Oral Tablet; Therapy: (Recorded:11Oct2017) to Recorded   2  ARIPiprazole 5 MG Oral Tablet; TAKE 1 TABLET DAILY; Therapy: 71AZL7217 to (Evaluate:10Nov2017)  Requested for: 74EKT8530; Last   Rx:11Oct2017 Ordered   3  BusPIRone HCl - 15 MG Oral Tablet; Take 1 tablet daily; Therapy: 75FLN5508 to ([employer] F[place])  Requested for: 02Oct2017; Last   Rx:02Oct2017 Ordered   4  Escitalopram Oxalate 10 MG Oral Tablet; Take 1 tablet daily; Therapy: 17ZPS5336 to (oGld Failing)  Requested for: 02Oct2017; Last   Rx:02Oct2017 Ordered   5  Fenofibrate 145 MG Oral Tablet; Take 1 tablet daily; Therapy: 90Uqh5747 to (Last Rx:14Gcn5302)  Requested for: 90Lfp9878 Ordered   6  Lisinopril 20 MG Oral Tablet; TAKE 1 TABLET DAILY FOR BLOOD PRESSURE; Therapy: 13ZHT9226 to (Evaluate:13Nov2017)  Requested for: 34CEV6616; Last   Rx:74Vxo4907 Ordered   7  TraMADol HCl - 50 MG Oral Tablet; Take 1 tablet 4 times daily; Therapy: 93JLI6334 to ([place])  Requested for: 63FTO2910; Last   Rx:10Oct2017 Ordered   8  TraZODone HCl - 50 MG Oral Tablet; two tablets at bedtime  Requested for: 68NFV2006;   Last Rx:84Mgr0416 Ordered   9  Zolpidem Tartrate 10 MG Oral Tablet; TAKE 1 TABLET AT BEDTIME; Therapy: 62CRB7846 to (Evaluate:75Ohq6051); Last Rx:10Oct2017 Ordered    Family Psych History  Mother    1  Family history of hypertension (V17 49) (Z82 49)  Sister    2  Family history of Bipolar 1 disorder  Brother    3  Family history of CHD (coronary heart disease)  Family History    4  Family history of Hypertension (V17 49)   5   Family history of Thyroid Cancer    Social History    · Being A Social Drinker   · Daily caffeine consumption   · Inadequate exercise (V69 0) (Z72 3)   ·    · Never A Smoker   · Working Full Time    Future Appointments    Date/Time Provider Specialty Site   10/18/2017 11:15 AM ALEXX Nowak  Psychiatry [facility]   10/19/2017 11:00 AM ALEXX Nowak  Psychiatry [facility]   10/20/2017 11:00 AM ALEXX Nowak   [facility]iatry [facility]   11/06/2017 05:45 PM Yvette Dennis,  Family Medicine [facility] [place]     Signatures   Electronically signed by : MARTINEZ Reyes; Oct 17 2017  2:21PM EST                       (Author)    Electronically signed by : CHAYA Dumont; Oct 17 2017  3:25PM EST                       (Author)    Electronically signed by : JOVANY Cosme; Oct 17 2017  3:34PM EST                       (Author)    Electronically signed by : MARTINEZ Jurado; Oct 18 2017  7:53AM EST                       (Author)    Electronically signed by : Erich Maza LCSW; Oct 18 2017  4:04PM EST                       (Author)

## 2018-01-09 NOTE — PROGRESS NOTES
Assessment   1  Numbness (782 0) (R20 0)   2  Bulging lumbar disc (722 10) (M51 26)   3  Major depressive disorder, recurrent, severe w/o psychotic behavior (296 33) (F33 2)    Plan   Bulging lumbar disc, Myofascial pain syndrome    · From  TraMADol HCl - 50 MG Oral Tablet TAKE 2 TABLETS 4 TIMES DAILY To    TraMADol HCl - 50 MG Oral Tablet TAKE 2 TABLET Every 4 hours  Numbness    · Gabapentin 300 MG Oral Capsule; TAKE 1 CAPSULE TWICE DAILY   · EMG TWO EXTREMITIES WITH OR W/O RELATED PARASPINAL AREAS; Status:Hold For    - Scheduling; Requested for:08Jan2018;   TWO : RLE  ONE : LLE    Discussion/Summary      EMG ordered start gabapentin 300 milligrams twice a day other medications tramadol as he was usually getting 90 days 2 pills every 4 hours in 4 months with spine specialist       Chief Complaint   follow up for depression, bulging lumbar disc, HTN  Patient is here today for follow up of chronic conditions described in HPI  History of Present Illness   here for 2 month checkup having back pain -- has been seeing spine specialist he has been stable meds are working and does not request any dosing were med changes still having numbness in legs and feet of unknown origin discussed this with the spine specialist and recommended gabapentin which he would like to start today      Review of Systems        Constitutional: No fever or chills, feels well, no tiredness, no recent weight gain or weight loss  Eyes: No complaints of eye pain, no red eyes, no discharge from eyes, no itchy eyes  ENT: no complaints of earache, no hearing loss, no nosebleeds, no nasal discharge, no sore throat, no hoarseness  Cardiovascular: No complaints of slow heart rate, no fast heart rate, no chest pain, no palpitations, no leg claudication, no lower extremity  Respiratory: No complaints of shortness of breath, no wheezing, no cough, no SOB on exertion, no orthopnea or PND        Gastrointestinal: No complaints of abdominal pain, no constipation, no nausea or vomiting, no diarrhea or bloody stools  Genitourinary: No complaints of dysuria, no incontinence, no hesitancy, no nocturia, no genital lesion, no testicular pain  Musculoskeletal: as noted in HPI  Integumentary: No complaints of skin rash or skin lesions, no itching, no skin wound, no dry skin  Neurological: No compliants of headache, no confusion, no convulsions, no numbness or tingling, no dizziness or fainting, no limb weakness, no difficulty walking  Psychiatric: Is not suicidal, no sleep disturbances, no anxiety or depression, no change in personality, no emotional problems  Endocrine: No complaints of proptosis, no hot flashes, no muscle weakness, no erectile dysfunction, no deepening of the voice, no feelings of weakness  Hematologic/Lymphatic: No complaints of swollen glands, no swollen glands in the neck, does not bleed easily, no easy bruising  Active Problems   1  Bulging lumbar disc (722 10) (M51 26)   2  Chronic tension-type headache (339 12) (G44 229)   3  Generalized anxiety disorder (300 02) (F41 1)   4  Grief reaction (309 0) (F43 20)   5  High risk medication use (V58 69) (Z79 899)   6  Hyperlipidemia (272 4) (E78 5)   7  Hypertension (401 9) (I10)   8  Insomnia (780 52) (G47 00)   9  Lateral epicondylitis of right elbow (726 32) (M77 11)   10  Major depressive disorder, recurrent, severe w/o psychotic behavior (296 33) (F33 2)   11  Myofascial pain syndrome (729 1) (M79 1)    Past Medical History   1  History of Herniated nucleus pulposus, L5-S1, right (722 10) (M51 27)   2  History of actinic keratosis (V13 3) (Z87 2)   3  History of low back pain (V13 59) (Z87 39)   4  Denied: History of seizure   5  Denied: History of traumatic injury of head     The active problems and past medical history were reviewed and updated today  Surgical History      The surgical history was reviewed and updated today  Family History   Mother    1  Family history of hypertension (V17 49) (Z82 49)  Sister    2  Family history of Bipolar 1 disorder  Brother    3  Family history of CHD (coronary heart disease)  Family History    4  Family history of Hypertension (V17 49)   5  Family history of Thyroid Cancer     The family history was reviewed and updated today  Social History    · Being A Social Drinker   · Daily caffeine consumption   · Inadequate exercise (V69 0) (Z72 3)   ·    · Never A Smoker   · Working Full Time  The social history was reviewed and updated today  The social history was reviewed and is unchanged  Current Meds    1  ARIPiprazole 5 MG Oral Tablet; TAKE 1 TABLET DAILY; Therapy: 11QMW1190 to (Evaluate:10Nov2017)  Requested for: 60YBT1795; Last     Rx:11Oct2017; Status: ACTIVE - Renewal Denied Ordered   2  BusPIRone HCl - 15 MG Oral Tablet; Take 1 tablet daily; Therapy: 06MKS6769 to (Concha Quezada)  Requested for: 77UMM2497; Last     Rx:06Nov2017; Status: ACTIVE - Renewal Denied Ordered   3  Escitalopram Oxalate 10 MG Oral Tablet; Take 1 tablet daily; Therapy: 65RXI2487 to (Katie Addison)  Requested for: 94BHU5000; Last     Rx:28Nov2017 Ordered   4  Fenofibrate 145 MG Oral Tablet; Take 1 tablet daily; Therapy: 32YBQ6182 to (Last Rx:94Wnl7460)  Requested for: 33ELY5552 Ordered   5  Lisinopril 20 MG Oral Tablet; TAKE 1 TABLET DAILY FOR BLOOD PRESSURE; Therapy: 28Apr2016 to (Concha Quezada)  Requested for: 13ILG6157; Last     Rx:06Nov2017 Ordered   6  TraMADol HCl - 50 MG Oral Tablet; TAKE 2 TABLETS 4 TIMES DAILY; Therapy: (Recorded:12Bsb6329) to Recorded   7  TraZODone HCl - 50 MG Oral Tablet; two tablets at bedtime  Requested for: 79AFV3493;     Last Rx:06Nov2017 Ordered   8  Zolpidem Tartrate 10 MG Oral Tablet; TAKE 1 TABLET AT BEDTIME; Therapy: 84HVE1997 to (Evaluate:79Ozl6530);  Last Rx:10Oct2017 Ordered     The medication list was reviewed and updated today  Allergies   1  No Known Drug Allergies    Vitals   Vital Signs    Recorded: 31TBJ3073 03:30PM   Heart Rate 74   Respiration 16   Systolic 231, RUE, Sitting   Diastolic 74, RUE, Sitting   Height 6 ft 6 in   Weight 260 lb    BMI Calculated 30 05   BSA Calculated 2 52     Physical Exam        Constitutional      General appearance: No acute distress, well appearing and well nourished  Ears, Nose, Mouth, and Throat      Otoscopic examination: Tympanic membrance translucent with normal light reflex  Canals patent without erythema  Oropharynx: Normal with no erythema, edema, exudate or lesions  Pulmonary      Respiratory effort: No increased work of breathing or signs of respiratory distress  Auscultation of lungs: Clear to auscultation, equal breath sounds bilaterally, no wheezes, no rales, no rhonci  Cardiovascular      Auscultation of heart: Normal rate and rhythm, normal S1 and S2, without murmurs  Carotid pulses: Normal        Abdomen      Abdomen: Non-tender, no masses  Liver and spleen: No hepatomegaly or splenomegaly  Lymphatic      Palpation of lymph nodes in neck: No lymphadenopathy  Musculoskeletal      Gait and station: Normal        Digits and nails: Normal without clubbing or cyanosis  Inspection/palpation of joints, bones, and muscles: Normal        Skin      Skin and subcutaneous tissue: Normal without rashes or lesions  Neurologic      Cranial nerves: Cranial nerves 2-12 intact  Sensation: Abnormal  -- Decreased feeling in both lower extremities        Psychiatric      Orientation to person, place and time: Normal        Mood and affect: Normal           Future Appointments      Date/Time Provider Specialty Site   02/21/2018 10:00 AM Leonel Kathleen DO Psychiatry [facility] 81     Signatures    Electronically signed by : Jovany Butler DO; Jan 8 2018  4:40PM EST                       (Author)

## 2018-01-10 NOTE — PSYCH
History of Present Illness  [facility] Clinical Progress Note [facility]:   Specialized Services Documentation - Therapist must complete separate progress note for each specific clinical activity in which the client participated during the day  (314) Group Psychotherapy: 0982-1519  Esteban Ludwig attended psychotherapy group that focused on healthy boundaries and communication with supports  He did not participate in group discussion, but was attentive to peers throughout the hour  No outward progress made towards goals  Continue psychotherapy group to explore stressors as well as effective and appropriate ways to communicate with supports  MARIA DE JESUS Caruso Intern  Treatment Plan Problem(s): 1 2  Amador Rodriguez [place], [place]     (541) Group Psychotherapy: [id number] Esteban Ludwig actively participated in wellness group focused on working on recovery from mental illness; anticipating potential triggers that could cause relapses and identifying healthy coping strategies to replace unhealthy strategies  Esteban Ludwig was attentive to explanation of relapse prevention plan explanation and S/S of early relapse  He was able to identify his own S/S of relapse from experience he stated  He was supportive to peers who also shared their own relapse fears and strategies to prevent same  Esteban Ludwig made good progress toward goals  Continue group to educate patient on the recovery process visualized as a road to better wellness and how to [facility] on or to get back onâ the road to recovery and overcome obstacles along the way  Treatment Plan Problem(s): 1 1,1 2  Uziel Garcia RN       (005) Education Therapy Goals set - Work on Shashank & Jaylyn Problem(s): 1 1, 1 6  Education Therapy Time - 0900 - 0930 Previous goal was met  Readiness to Learning:  He is receptive to learning  There are  no barriers to learning  Learning Assessment Time - 1330 - 1400   Education completed on  illness and wellness tools     The teaching method was verbal and demonstration  Shared area of learning: Yes  Mort Console, MTI  JOVANY Brown     (070) Allied Therapy 1341-5903 Sharri Harris actively shared in [facility] group focused on stages of change and skills to solve problems  He shared during bert analysis and in movement task  He was encouraged to explore what may interfere with change behavior and specific goals to work toward in treatment  He shared a desire to continue to work on strengthening boundaries  Khaipriscila Harris was able to share a positive change he made in the past (moving from the [employer] to [employer])  He participated in â[employer] and [address] to reinforce options related to change  Some effort toward goal noted  Continue to encourage active work on goal development and skills to achieve  Treatment Plan Problem(s): 1 2, 1 5  JOVANY Brown     ( ) Other 0800 VM on this CM's phone from [facility] that two additional days of IOP 11/7 and 11/9 were authorized, with same auth  number, to be extended for D/C on 11/9/17  Sharri Harris was informed of same  Paperwork for [employer] employer[employer] was completed and signed by Dr Anna Kang MD and given today to Sharri Harris  He will forward to his company to confirm that his treatment has been ongoing in mental health PHP/IOP at [facility] RN     Case Management Note:   14/ IOP#8 [id number] Sharri Harris met with this CM to review progress in Program and upcoming D/C  He stated that he has made progress toward his treatment goals of setting healthy boundaries at home but this requires a lot of reinforcing  Spoke about his "guilt" of saying "no" to others in his home such as lending his computer to his nephew's girlfriend and other ways he is feeling guilty when he is taking care of himself  He is still struggling with being seen in public by fellow employees and having others  him   He is receiving support from his partner regarding this challenge but he grapples still with putting others first and feeling badly about taking care of himself  Lauren Glaser stated that pain from back has been increasing and he has been preparing for back surgery later this month that he has been putting off since last year  Lauren Glaser denied SI and HI  He denied any side effects from medications  TREATMENT SESSION NUMBER: 14   Current suicide risk is low  Medications not changed/added/denied  Erenest Baumgarten, RN      Active Problems    1  Bulging lumbar disc (722 10) (M51 26)   2  Chronic tension-type headache (339 12) (G44 229)   3  Encounter for pre-operative cardiovascular clearance (V72 81) (Z01 810)   4  Generalized anxiety disorder (300 02) (F41 1)   5  Grief reaction (309 0) (F43 20)   6  High risk medication use (V58 69) (Z79 899)   7  Hyperlipidemia (272 4) (E78 5)   8  Hypertension (401 9) (I10)   9  Insomnia (780 52) (G47 00)   10  Lateral epicondylitis of right elbow (726 32) (M77 11)   11  Major depressive disorder, recurrent, severe w/o psychotic behavior (296 33) (F33 2)   12  Myofascial pain syndrome (729 1) (M79 1)    Past Medical History    1  History of Herniated nucleus pulposus, L5-S1, right (722 10) (M51 27)   2  History of actinic keratosis (V13 3) (Z87 2)   3  History of low back pain (V13 59) (Z87 39)   4  Denied: History of seizure   5  Denied: History of traumatic injury of head    Allergies    1  No Known Drug Allergies    Current Meds   1  ARIPiprazole 5 MG Oral Tablet; TAKE 1 TABLET DAILY; Therapy: 99GCT3476 to (Evaluate:10Nov2017)  Requested for: 28AKW0927; Last   Rx:11Oct2017 Ordered   2  BusPIRone HCl - 15 MG Oral Tablet; Take 1 tablet daily; Therapy: 05URW4605 to (Jennifer Caller)  Requested for: 15EMS9436; Last   Rx:06Nov2017 Ordered   3  Escitalopram Oxalate 10 MG Oral Tablet; Take 1 tablet daily; Therapy: 12SKF6750 to (Jennifer Caller)  Requested for: 04DAM2050; Last   Rx:06Nov2017 Ordered   4  Fenofibrate 145 MG Oral Tablet;  Take 1 tablet daily; Therapy: 07YNJ9388 to (Last VM:13AGY7070)  Requested for: 38EUF7713 Ordered   5  Lisinopril 20 MG Oral Tablet; TAKE 1 TABLET DAILY FOR BLOOD PRESSURE; Therapy: 25Qqq1118 to (Jayshree Rumple)  Requested for: 88PDV8581; Last   Rx:06Nov2017 Ordered   6  TraMADol HCl - 50 MG Oral Tablet; Take 1 tablet 4 times daily; Therapy: 62YPJ3527 to (Evaluate:29Agt0966)  Requested for: 14DBV5433; Last   Rx:06Nov2017 Ordered   7  TraZODone HCl - 50 MG Oral Tablet; two tablets at bedtime  Requested for: 82QFX8144;   Last Rx:06Nov2017 Ordered   8  Zolpidem Tartrate 10 MG Oral Tablet; TAKE 1 TABLET AT BEDTIME; Therapy: 24GAN3067 to (Evaluate:76Kzu1540); Last Rx:10Oct2017 Ordered    Family Psych History  Mother    1  Family history of hypertension (V17 49) (Z82 49)  Sister    2  Family history of Bipolar 1 disorder  Brother    3  Family history of CHD (coronary heart disease)  Family History    4  Family history of Hypertension (V17 49)   5   Family history of Thyroid Cancer    Social History    · Being A Social Drinker   · Daily caffeine consumption   · Inadequate exercise (V69 0) (Z72 3)   ·    · Never A Smoker   · Working Full Time    Future Appointments    Date/Time Provider Specialty Site   02/21/2018 10:00 AM Hammad Chacon DO Psychiatry [facility]   01/08/2018 03:30 PM Primo Dennis DO Family Medicine [facility]     Signatures   Electronically signed by : CHAYA Torrez; Nov 7 2017  2:01PM EST                       (Author)    Electronically signed by : Ernesta Hamman, MT-BC; Nov 7 2017  2:04PM EST                       (Author)    Electronically signed by : MARTINEZ Negron; Nov 7 2017  2:14PM EST                       (Author)    Electronically signed by : Glorine Gaucher, RN; Nov 7 2017  3:09PM EST                       (Author)    Electronically signed by : Glorine Gaucher, RN; Nov 7 2017  4:28PM EST                       (Author)    Electronically signed by : Lyn Livingston MSWLSW; Nov 8 2017  8:25AM EST                       (Author)

## 2018-01-11 NOTE — PSYCH
History of Present Illness  [facility] Clinical Progress Note [facility]:   Specialized Services Documentation - Therapist must complete separate progress note for each specific clinical activity in which the client participated during the day  (006) Group Psychotherapy: [id number] Jose Guadalupe Dangelo participated in psychotherapy group focused on boundaries and finding motivation to work on goals  Jose Guadaluep Dangelo identified having difficulty multi-tasking as a stressor  He actively engaged in group discussion, talking about having a lot of difficulty finding motivation to do things and feeling overwhelmed by all the projects he wants to get done around his house  Group discussed ways to set goals, prioritize them and focus on one thing at a time to improve productivity and motivation  Jose Guadalupe Dangelo was able to provide some good suggestions and support to peers about ways to focus on goals  Moderate progress toward goals  Continue psychotherapy group to encourage Jose Guadalupe Dangelo to explore stressors and coping  Treatment Plan Problem(s): 1 1, 1 2  Karishma Ramirez MSW, LSW     (765) Group Psychotherapy: [id number] Jose Guadalupe Dangelo participated in wellness group focused on identifying how to use healthy boundaries in relationships that will contribute to more effective and supportive relationships for living and recovery  Jose Guadalupe Dangelo shared that he has been learning and practicing better self-care since starting [facility]  He related that he needs to set healthier boundaries in his home especially as things have become very stressful with the many relatives that have moved in  He stated that he has always been generous but now sees that it is also enabling others to practice bad and unhealthy behaviors  He discussed how he would send gifts to nephews and nieces and never get an acknowledgement  Jose Guadalupe Dangelo related to how anger builds up when not observing healthy boundaries that can "ruin good relationships " Jose Guadalupe Dangelo made good progress toward goals   Continue to offer group to provide education on healthy boundary setting in interpersonal and social, professional relationships to grow more supportive relationships and decrease anger and feelings of isolation  Treatment Plan Problem(s): 1 1,1 2,1 4  Burke Cruz RN       (791) Education Therapy Goals set - work on self-care    Treatment Plan Problem(s): 1 1, 1 6  Education Therapy Time - 0900 - 0930 Previous goal was met  Readiness to Learning:  He is receptive to learning  There are  no barriers to learning  Learning Assessment Time - 1330 - 1400   Education completed on  illness and wellness tools  The teaching method was  verbal  Shared area of learning: Yes  JOVANY Nash     (819) Allied Therapy [id number] Nelly Salcedo actively shared in [facility] group focused on DBT skill of Improving The Moment  He engaged in therapist-led relaxation exercises and DBT skill book creation task  Group explored breath counting, visualization, and DBT distress tolerance skills  Nelly Salcedo reported gaining from the relaxation exercises  Nelly Salcedo shared his mantra regarding knowing to behave better  Some good progress towards goal shared  Continue AT to further encourage the use of distress tolerance skills  CHAYA Ellis  Treatment Plan Problem(s): 1 1, 1 6  JOVANY Nash       Case Management Note:   11/IOP #1 [id number] Nelly Salcedo met with this CM to discuss D/C planning and progress in Program  He stated that he has not begun to contact OP psychiatrists for F/U after D/C from [facility] but he will do this after Program today by contacting his insurance company for a provider list  He stated that he will also speak with his PCP, Dr Artemio Daniel to request that he cover his medications until he sees an OP psychiatrist  Nelly Salcedo also stated that he has appointments tomm with MD and Friday with Steven, OP EAP therapist  He will return to Program on Monday, 10/30/17 for IOP Day #2   Nelly Salcedo stated that he had nightmares last night again after he learned that his former PCP committed suicide  He stated that he had been his patient for 12 years and that, although he did not go to the MD often, he felt shocked and sad about this news that he did not know  He said it also brought to him the idea of suicide being a possible option but he denied feeling suicidal stating that he feels there are alternatives to hurting himself  He discussed changing jobs and not having to make difficult decisions any longer with approving or denying individuals disability appeals  Michael Grande stated that he never realized that this job is so very out of line with who he is as a caring person and he has had to "numb" himself and begin to be "unfeeling" to make these professional decisions and this has become too stressful for him to continue to do  TREATMENT SESSION NUMBER: 11   Current suicide risk is low  Medications not changed/added/denied  Mervat Cabrera RN   [facility] Follow-up Physician's Orders:   10/25/2017  8:22 AM IOP Today   MD Mervat Donaldson RN      Active Problems    1  Bulging lumbar disc (722 10) (M51 26)   2  Chronic tension-type headache (339 12) (G44 229)   3  Generalized anxiety disorder (300 02) (F41 1)   4  Grief reaction (309 0) (F43 20)   5  High risk medication use (V58 69) (Z79 899)   6  Hyperlipidemia (272 4) (E78 5)   7  Hypertension (401 9) (I10)   8  Insomnia (780 52) (G47 00)   9  Lateral epicondylitis of right elbow (726 32) (M77 11)   10  Major depressive disorder, recurrent, severe w/o psychotic behavior (296 33) (F33 2)   11  Myofascial pain syndrome (729 1) (M79 1)   12  Need for diphtheria-tetanus-pertussis (Tdap) vaccine (V06 1) (Z23)    Past Medical History    1  History of Herniated nucleus pulposus, L5-S1, right (722 10) (M51 27)   2  History of actinic keratosis (V13 3) (Z87 2)   3  History of low back pain (V13 59) (Z87 39)   4  Denied: History of seizure   5   Denied: History of traumatic injury of head    Allergies    1  No Known Drug Allergies    Current Meds   1  Abilify 5 MG Oral Tablet; Therapy: (Recorded:11Oct2017) to Recorded   2  ARIPiprazole 5 MG Oral Tablet; TAKE 1 TABLET DAILY; Therapy: 34XTT3474 to (Evaluate:10Nov2017)  Requested for: 09UCI1167; Last   Rx:11Oct2017 Ordered   3  BusPIRone HCl - 15 MG Oral Tablet; Take 1 tablet daily; Therapy: 76HXX6981 to (Lis Deiters)  Requested for: 02Oct2017; Last   Rx:02Oct2017 Ordered   4  Escitalopram Oxalate 10 MG Oral Tablet; Take 1 tablet daily; Therapy: 81ETQ7540 to (Lis Deiters)  Requested for: 02Oct2017; Last   Rx:02Oct2017 Ordered   5  Fenofibrate 145 MG Oral Tablet; Take 1 tablet daily; Therapy: 28Eub1785 to (Last Rx:83Twm6502)  Requested for: 80Hjf6927 Ordered   6  Lisinopril 20 MG Oral Tablet; TAKE 1 TABLET DAILY FOR BLOOD PRESSURE; Therapy: 90VFV8165 to (Evaluate:13Nov2017)  Requested for: 91YAE1931; Last   Rx:59Dae7845 Ordered   7  TraMADol HCl - 50 MG Oral Tablet; Take 1 tablet 4 times daily; Therapy: 97OAI9643 to (Tori Sotelo)  Requested for: 13LIB2652; Last   Rx:10Oct2017 Ordered   8  TraZODone HCl - 50 MG Oral Tablet; two tablets at bedtime  Requested for: 43TVP3845;   Last Rx:38Fmb7894 Ordered   9  Zolpidem Tartrate 10 MG Oral Tablet; TAKE 1 TABLET AT BEDTIME; Therapy: 45KBY3988 to (Evaluate:90Jed3634); Last Rx:10Oct2017 Ordered    Family Psych History  Mother    1  Family history of hypertension (V17 49) (Z82 49)  Sister    2  Family history of Bipolar 1 disorder  Brother    3  Family history of CHD (coronary heart disease)  Family History    4  Family history of Hypertension (V17 49)   5   Family history of Thyroid Cancer    Social History    · Being A Social Drinker   · Daily caffeine consumption   · Inadequate exercise (V69 0) (Z72 3)   ·    · Never A Smoker   · Working Full Time    Future Appointments    Date/Time Provider Specialty Site   10/25/2017 10:45 AM ALEXX Sorto  Psychiatry [facility]   10/26/2017 10:30 AM ALEXX Marino  Psychiatry [facility]   10/27/2017 10:45 AM ALEXX Marino   Psychiatry [facility]   11/06/2017 05:45 PM Georgie Dennis DO [facility]   11/10/2017 11:00 AM Georgie Dennis DO [facility]     Signatures   Electronically signed by : ALEXX Song ; Oct 25 2017  8:22AM EST                       (Author)    Electronically signed by : Sarah Ayala RN; Oct 25 2017  8:39AM EST                       (Author)    Electronically signed by : Sarah Ayala RN; Oct 25 2017 10:10AM EST                       (Author)    Electronically signed by : MARTINEZ Hernandez; Oct 25 2017 12:21PM EST                       (Author)    Electronically signed by : CHAYA Munroe; Oct 25 2017  2:20PM EST                       (Author)    Electronically signed by : Sarah Ayala RN; Oct 25 2017  2:28PM EST                       (Author)    Electronically signed by : JOVANY Rodriguez; Oct 25 2017  2:29PM EST                       (Author)

## 2018-01-11 NOTE — PSYCH
History of Present Illness  [facility] Clinical Progress Note [facility]:   Specialized Services Documentation - Therapist must complete separate progress note for each specific clinical activity in which the client participated during the day  ([phone]) Group Psychotherapy: [phone]) Nelly Salcedo participated in psychotherapy group focused on communicating and setting boundaries with supports  Nelly Salcedo identified work as a stressor today  He actively engaged in group discussion, talking about how he struggles with some of his supports and their disrespect of his boundaries  He expressed frustration at feeling like he clearly expressed his needs to his partner and mother, and yet they will not give him the space that he is requesting  Group discussed the importance of communicating needs clearly with supports, standing firm with boundaries, and the importance of seeking out people that can meet one's needs in healthy ways  Moderate progress toward goals noted  Continue psychotherapy group to encourage Nelly Salcedo to explore stressors and coping  Treatment Plan Problem(s): 1 1, 1 2, 1 4  Lyle Bradshaw MSW, LSW     (258) Group Psychotherapy: [phone] Nelly Salcedo participated in wellness group focused on learning about medications used in treatment and recovery  Two educational handouts were shared examining apprehensions about taking medications as well as medication management strategies, an important part of recovery and relapse prevention  Nelly Salcedo actively shared in education and group discussion of individual methods of remembering to take medications and how he feels about taking medication for mental illness  Nelly Salcedo made good progress toward goals  Continue to offer group to educate on his/her medications and engaging successfully in using medication treatment, in addition to cognitive and behavioral therapies, in recovery from mental illness  Treatment Plan Problem(s): 1 3   Burke Cruz RN       (306) Education Therapy Goals set - Work on Shashank [employer] Herminioley Problem(s): 1 1, 1 6  Education Therapy Time - 0900 - 0930 Previous goal was met  Readiness to Learning:  He is receptive to learning  There are  no barriers to learning  Learning Assessment Time - 1330 - 1400   Education completed on  illness and wellness tools  The teaching method was  verbal and demonstration  Shared area of learning: Yes  Mel Antoine, JOVNAY Castillo     (735) Allied Therapy [phone] Kayce Navarrete actively shared in [facility] group focused on [facility] and the [place] [facility] skills  He was engaged in several tasks to practice one-mindfully, non-judgmentally and effectively  He offered feedback and continues to express gaining from PMR exercises  Kayce Navarrete was able to voice a desire to work on being less judgmental of self  Group discussed specific ways to practice refocusing thoughts to the present and offered encouragement to use these things regularly to manage stressors consistently  Some effort noted toward tx goal  Continue AT to encourage development of skills and consistent practice  Treatment Plan Problem(s): 1 5,1 6  JOVANY Spencer     ( ) Other 7049 Initial appointment made with Dr Naye Lorenz on 2/21/18 at [facility] informed of same and paperwork was given to complete before scheduled appointment  Radha Valadez RN     Case Management Note:   IOP Day#3 [id number] Kayce Navarrete met with this CM to review progress on days off (IOP days off) and progress in Program as well as D/C planning  Kayce Navarrete stated that he printed off providers for OP therapist and OP psychiatrist  He stated that he attended his appointment with Steven at [facility], as mandated by his EAP/Employer   Kayce Navarrete stated that he was a little disappointed in this meeting as he felt Steven (OP therapist) was "watching the clock" while he met with her " He stated that he would like to explore obtaining an appointment with another OP therapist when he finishes his mandated EAP sessions with her  Lina Wallace also stated that he will speak with his PCP, as he has an appointment coming up, regarding whether he would be willing to prescribe Joshua's psychotropic medications until he can secure an OP psychiatrist apptmnt  List of OP providers was reviewed and he identified several providers he will contact regarding their availability  He requested an appointment with Dr Isabel Hernandez, who is listed on his provider list, and was informed again that request could be submitted but wait would be very long as appointments are not available for the remainder of year for new patients with OP psychiatrists at [address] [place] [address]  Lina Wallace gave this CM paperwork to be completed for his STD and he will receive completed paperwork when he returns Wed  11/1/17 on his next IOP day  Lina Wallace stated that he is still experiencing sleep disturbances despite practicing DBT/CBT strategies, taking sleep medications and practicing good sleep hygiene  He noted that he is also experiencing consistent worry about his job, especially in light of surgery for his back that is scheduled 11/14/17  He stated that the pain has increased in his back but he is worried that he may lose his job due to having this back surgery that he feels he can no longer put off  Lina Wallace denied SI and HI as well as any SE from his medications  TREATMENT SESSION NUMBER: 12   Current suicide risk is low  Medications not changed/added/denied  Bree Camp RN      Active Problems    1  Bulging lumbar disc (722 10) (M51 26)   2  Chronic tension-type headache (339 12) (G44 229)   3  Generalized anxiety disorder (300 02) (F41 1)   4  Grief reaction (309 0) (F43 20)   5  High risk medication use (V58 69) (Z79 899)   6  Hyperlipidemia (272 4) (E78 5)   7  Hypertension (401 9) (I10)   8  Insomnia (780 52) (G47 00)   9  Lateral epicondylitis of right elbow (726 32) (M77 11)   10   Major depressive disorder, recurrent, severe w/o psychotic behavior (296 33) (F33 2)   11  Myofascial pain syndrome (729 1) (M79 1)   12  Need for diphtheria-tetanus-pertussis (Tdap) vaccine (V06 1) (Z23)    Past Medical History    1  History of Herniated nucleus pulposus, L5-S1, right (722 10) (M51 27)   2  History of actinic keratosis (V13 3) (Z87 2)   3  History of low back pain (V13 59) (Z87 39)   4  Denied: History of seizure   5  Denied: History of traumatic injury of head    Allergies    1  No Known Drug Allergies    Current Meds   1  Abilify 5 MG Oral Tablet; Therapy: (Recorded:11Oct2017) to Recorded   2  ARIPiprazole 5 MG Oral Tablet; TAKE 1 TABLET DAILY; Therapy: 22MOH5883 to (Evaluate:10Nov2017)  Requested for: 67YNY7440; Last   Rx:11Oct2017 Ordered   3  BusPIRone HCl - 15 MG Oral Tablet; Take 1 tablet daily; Therapy: 56VNC4379 to (Anabella Dickerson)  Requested for: 02Oct2017; Last   Rx:02Oct2017 Ordered   4  Escitalopram Oxalate 10 MG Oral Tablet; Take 1 tablet daily; Therapy: 47AVZ0379 to (Anabella Dickerson)  Requested for: 02Oct2017; Last   Rx:56Ykn4359 Ordered   5  Fenofibrate 145 MG Oral Tablet; Take 1 tablet daily; Therapy: 39Jlw6402 to (Last Rx:14Hcq8995)  Requested for: 67Bpd7053 Ordered   6  Lisinopril 20 MG Oral Tablet; TAKE 1 TABLET DAILY FOR BLOOD PRESSURE; Therapy: 92JOA8504 to (Evaluate:13Nov2017)  Requested for: 21PKX0218; Last   Rx:80Izb3230 Ordered   7  TraMADol HCl - 50 MG Oral Tablet; Take 1 tablet 4 times daily; Therapy: 34IAW7659 to (Mari Davenport)  Requested for: 74QWI9290; Last   Rx:10Oct2017 Ordered   8  TraZODone HCl - 50 MG Oral Tablet; two tablets at bedtime  Requested for: 50GKJ7272;   Last Rx:73Viu4386 Ordered   9  Zolpidem Tartrate 10 MG Oral Tablet; TAKE 1 TABLET AT BEDTIME; Therapy: 59TYH5891 to (Evaluate:39Tcm3033); Last Rx:55Psl9936 Ordered    Family Psych History  Mother    1  Family history of hypertension (V17 49) (Z82 49)  Sister    2   Family history of Bipolar 1 disorder  Brother    3  Family history of CHD (coronary heart disease)  Family History    4  Family history of Hypertension (V17 49)   5  Family history of Thyroid Cancer    Social History    · Being A Social Drinker   · Daily caffeine consumption   · Inadequate exercise (V69 0) (Z72 3)   ·    · Never A Smoker   · Working Full Time    Future Appointments    Date/Time Provider Specialty Site   11/01/2017 11:45 AM ALEXX Mcgill  Psychiatry [facility]   11/03/2017 12:00 PM Renu Rodriges M D   Psychiatry [facility]   11/06/2017 05:45 PM Brian Dennis DO [facility]   11/10/2017 11:00 AM Brian Dennis DO [facility]     Signatures   Electronically signed by : MARTINEZ Eagle; Oct 30 2017 12:48PM EST                       (Author)    Electronically signed by : JOVANY Valdivia; Oct 30 2017  2:24PM EST                       (Author)    Electronically signed by : CHAYA Beaver; Oct 30 2017  2:28PM EST                       (Author)    Electronically signed by : Margaret Lozano RN; Oct 30 2017  2:45PM EST                       (Author)    Electronically signed by : Margaret Lozano RN; Oct 30 2017  3:23PM EST                       (Author)    Electronically signed by : Margaret Lozano RN; Oct 30 2017  3:29PM EST                       (Author)

## 2018-01-11 NOTE — PSYCH
History of Present Illness  [facility] Progress Note [facility]:   Specialized Services Documentation - Therapist must complete separate progress note for each specific clinical activity in which the client participated during the day  (832) Group Psychotherapy: [id number] Silvio Weber participated in wellness group focused on using cognitive and behavioral strategies in combination with prescribed medication, or alone, for mental health wellness  Silvio Weber was attentive to education and handout but did not share in peer discussion  Silvio Weber made slow beginning progress toward goals  Continue to offer group to educate on positive aspects of combining the use of CBT strategies, along with medication prescribed, for recovery and better wellness  Treatment Plan Problem(s): 1 1,1 2,1 3  Coty Shaw RN     (215) Group Psychotherapy: (10:45-11:45) Silvio Weber participated in psychotherapy group focused on stigma of mental illness  Silvio Weber identified needing to make his mortgage payment as a stressor today  He actively engaged in group discussion, talking about people not understanding his depression and fearing that he is being judged, particularly by people at work  Group discussed stigma surrounding mental illness and the guilt that comes along with asking for help, as well as ways to think differently about these issues  Moderate progress noted toward goals  Continue psychotherapy group to encourage Silvio Weber to explore stressors and coping  Treatment Plan Problem(s): 1 1, 1 2, 1 4  Mary Price MSW, LSW       (632) Education Therapy Goals set - Clean bedroom    Treatment Plan Problem(s): 1 1, 1 2  Education Therapy Time - 0900 - 0930 Previous goal was met  Readiness to Learning:  He is receptive to learning  There are  no barriers to learning  Learning Assessment Time - 1330 - 1400   Education completed on  illness and wellness tools  The teaching method was  verbal, written and demonstration   Shared area of learning: Yes  Carlos Manuel Males, MTI  Doyce Clubs, MT-BC     (710) Allied Therapy 2267-7630 Tyree Zeferinomalka actively shared in [facility] group focused on assertiveness from Oliver Chavez Interpersonal Effectiveness  He engaged in role-play instrument improvisation task  Group explored assertiveness, passive, passive aggressive, and aggressive  Group was introduced to [address] [facility], and FAST as ways to be assertive in getting what you want without hurting the relationship or losing self-respect  Tyree Zeferinomalka shared that he tends to be aggressive, especially when communicating at home  Some good beginning progress towards goal shared  Continue AT to further encourage the understanding of interpersonal effectiveness to be assertive  Carlos Manuel Males, MTI  Treatment Plan Problem(s): 1 2  Doyce Clubs, MT-BC     ( ) Other Shonna Troncoso from [facility] left  with authorization for 10 PHP treatment days 10/11/17-10/24/17 under UZUE#[id number]  For D/C support call [phone]  Shalini Bhat RN     Case Management Note:   0411-3325 Tyree Toth met with this  to review and sign TP  He was given a copy of same  Tyree Toth requested a list of his medications and was given same  He stated he could not remember if he was to take one or two Lexapro tablets daily  This RN reviewed list of medications with Tyree Toth and discussed his system to take medications  He stated that he takes out his medications and leaves them (tablets) the night before for the AM on the kitchen counter where he sees them  Tyree Toth stated that this system works for him although other suggestions were made such as tablet box  Discussed that insurance unwilling to pay for Abilify but that he still wants to take it as he feels he "needs it " Tyree Toth will speak with his partner, Lucian Schmidt who is a [facility] manager and they will work with pharmacist in researching any coupons available for Abilify   Tyree Toth stated that he "knows I am quiet and I will try to speak up more in groups " Jj Cabrera denied SI and HI  He denied any SE from medications  TREATMENT SESSION NUMBER: 2     Medications not changed/added/denied  Carl De Santiago RN      Active Problems    1  Bulging lumbar disc (722 10) (M51 26)   2  Chronic tension-type headache (339 12) (G44 229)   3  Generalized anxiety disorder (300 02) (F41 1)   4  Grief reaction (309 0) (F43 20)   5  Hyperlipidemia (272 4) (E78 5)   6  Hypertension (401 9) (I10)   7  Insomnia (780 52) (G47 00)   8  Lateral epicondylitis of right elbow (726 32) (M77 11)   9  Major depressive disorder, recurrent, severe w/o psychotic behavior (296 33) (F33 2)   10  Myofascial pain syndrome (729 1) (M79 1)   11  Need for diphtheria-tetanus-pertussis (Tdap) vaccine (V06 1) (Z23)    Past Medical History    1  History of Herniated nucleus pulposus, L5-S1, right (722 10) (M51 27)   2  History of actinic keratosis (V13 3) (Z87 2)   3  History of low back pain (V13 59) (Z87 39)   4  Denied: History of seizure   5  Denied: History of traumatic injury of head    Allergies    1  No Known Drug Allergies    Current Meds   1  Abilify 5 MG Oral Tablet; Therapy: (Recorded:11Oct2017) to Recorded   2  ARIPiprazole 5 MG Oral Tablet; TAKE 1 TABLET DAILY; Therapy: 35RRX4993 to (Evaluate:10Nov2017)  Requested for: 88ACG5294; Last   Rx:11Oct2017 Ordered   3  BusPIRone HCl - 15 MG Oral Tablet; Take 1 tablet daily; Therapy: 89ZCJ0028 to (Jarales Cramp)  Requested for: 02Oct2017; Last   Rx:51Xxh0595 Ordered   4  Escitalopram Oxalate 10 MG Oral Tablet; Take 1 tablet daily; Therapy: 89QUD4297 to (Jarales Cramp)  Requested for: 02Oct2017; Last   Rx:02Oct2017 Ordered   5  Fenofibrate 145 MG Oral Tablet; Take 1 tablet daily; Therapy: 04Xlm4270 to (Last Rx:73Dge9661)  Requested for: 34Kkv4155 Ordered   6  Lisinopril 20 MG Oral Tablet; TAKE 1 TABLET DAILY FOR BLOOD PRESSURE; Therapy: 48ZOK9546 to (Evaluate:13Nov2017)  Requested for: 15FJY3988; Last   Rx:17May2017 Ordered   7   TraMADol HCl - 50 MG Oral Tablet; Take 1 tablet 4 times daily; Therapy: 12BUK8328 to (Conor Mccall)  Requested for: 22RXP1675; Last   Rx:10Oct2017 Ordered   8  TraZODone HCl - 50 MG Oral Tablet; two tablets at bedtime  Requested for: 92GWM2100;   Last Rx:69Ayn8561 Ordered   9  Zolpidem Tartrate 10 MG Oral Tablet; TAKE 1 TABLET AT BEDTIME; Therapy: 01MKZ5433 to (Evaluate:90Zwg2713); Last Rx:10Oct2017 Ordered    Family Psych History  Mother    1  Family history of hypertension (V17 49) (Z82 49)  Sister    2  Family history of Bipolar 1 disorder  Brother    3  Family history of CHD (coronary heart disease)  Family History    4  Family history of Hypertension (V17 49)   5  Family history of Thyroid Cancer    Social History    · Being A Social Drinker   · Daily caffeine consumption   · Inadequate exercise (V69 0) (Z72 3)   ·    · Never A Smoker   · Working Full Time    Future Appointments    Date/Time Provider Specialty Site   10/13/2017 10:00 AM ALEXX Rendon   Psychiatry [facility] PARTIAL HOSPITALIZATION   11/06/2017 05:45 PM Dania Dennis,  Family Medicine [facility]     Signatures   Electronically signed by : MARTINEZ Herron; Oct 12 2017 12:56PM EST                       (Author)    Electronically signed by : CHAYA Byers; Oct 12 2017  3:19PM EST                       (Author)    Electronically signed by : JOVANY Martínez; Oct 12 2017  3:20PM EST                       (Author)    Electronically signed by : JOVANY Martínez; Oct 12 2017  3:32PM EST                       (Author)    Electronically signed by : Mervat Cabrera RN; Oct 12 2017  3:40PM EST                       (Author)    Electronically signed by : CHAYA Byers; Oct 12 2017  3:58PM EST                       (Author)    Electronically signed by : Mervat Cabrera RN; Oct 12 2017  4:42PM EST                       (Author)

## 2018-01-11 NOTE — PSYCH
History of Present Illness  Psychotherapy Provided  [facility]: Individual Psychotherapy 50 minutes provided today  Goals addressed in session:   Met with pt individually for an initial session  Pt states that he has been feeling very depressed for the past couple of years but that it appears to be getting worse for the past 6 months  Pt discussed multiple environmental stressors that affect his depression related to work and family  Pt reports "flipping out" at work yesterday and now has to contact the employer's mental health services before returning back to work  Discussed options for treatment and pt is in agreement with a referral to the [facility] program at [facility]  Pt will follow up with this worker as needed  HPI - Psych: Pt has recently been placed in Lexapro and Buspar, which he reports being on in the past  Pt works full time and moved here 12 years ago for his job  Pt lives with his mother, , sister, and her two grown children  Pt states home is stressful as he is the main income provider  Pt report consistent thoughts to end his life, however, denies intent to act on them as he knows it would hurt his mother and  too much  Pt denies ever acting on the thoughts  Pt feels he can be safe until he is linked with the [facility] program  Pt does understand that if the thoughts get worse the best thing to do would be to go to the ER for a Crisis assessment  Pt was calm and cooperative throughout the session  Pt's mood appeared depressed and affect was flat   Note   Note:   This worker will be completing the referral for the [facility] program  Pt will participate in that program and will follow up as needed in the future  Assessment    1   MDD (major depressive disorder) (296 20) (F32 9)    Signatures   Electronically signed by : Ilda Mc LCSW; Oct  5 2017  9:44AM EST                       (Author)    Electronically signed by : Ilda Mc LCSW; Oct  6 2017  8:38AM EST                       (Author)

## 2018-01-11 NOTE — PSYCH
History of Present Illness  [facility] Clinical Progress Note [facility]:   Specialized Services Documentation - Therapist must complete separate progress note for each specific clinical activity in which the client participated during the day  Case Management Note:   0800 Amandeep Barnes called to say he would not be attending Program today as he was called to go and meet with his back surgeon  Medications not changed/added/denied  Cari Garcia, RN      Active Problems    1  Bulging lumbar disc (722 10) (M51 26)   2  Chronic tension-type headache (339 12) (G44 229)   3  Generalized anxiety disorder (300 02) (F41 1)   4  Grief reaction (309 0) (F43 20)   5  High risk medication use (V58 69) (Z79 899)   6  Hyperlipidemia (272 4) (E78 5)   7  Hypertension (401 9) (I10)   8  Insomnia (780 52) (G47 00)   9  Lateral epicondylitis of right elbow (726 32) (M77 11)   10  Major depressive disorder, recurrent, severe w/o psychotic behavior (296 33) (F33 2)   11  Myofascial pain syndrome (729 1) (M79 1)   12  Need for diphtheria-tetanus-pertussis (Tdap) vaccine (V06 1) (Z23)    Past Medical History    1  History of Herniated nucleus pulposus, L5-S1, right (722 10) (M51 27)   2  History of actinic keratosis (V13 3) (Z87 2)   3  History of low back pain (V13 59) (Z87 39)   4  Denied: History of seizure   5  Denied: History of traumatic injury of head    Allergies    1  No Known Drug Allergies    Current Meds   1  Abilify 5 MG Oral Tablet (ARIPiprazole); Therapy: (Recorded:11Oct2017) to Recorded   2  ARIPiprazole 5 MG Oral Tablet; TAKE 1 TABLET DAILY; Therapy: 30XWH7542 to (Evaluate:10Nov2017)  Requested for: 51LVG3128; Last   Rx:11Oct2017 Ordered   3  BusPIRone HCl - 15 MG Oral Tablet; Take 1 tablet daily; Therapy: 47KPZ3993 to (Nathan Muñoz)  Requested for: 02Oct2017; Last   Rx:80Gcl6594 Ordered   4  Escitalopram Oxalate 10 MG Oral Tablet; Take 1 tablet daily;    Therapy: 54YIB6731 to (Evaluate:02Smi5563) Requested for: 97JHY1806; Last   Rx:03Tfp3270 Ordered   5  Fenofibrate 145 MG Oral Tablet; Take 1 tablet daily; Therapy: 27Bjj8567 to (Last Rx:77Ypu5854)  Requested for: 39Xeo5796 Ordered   6  Lisinopril 20 MG Oral Tablet; TAKE 1 TABLET DAILY FOR BLOOD PRESSURE; Therapy: 36DJW9338 to (Evaluate:13Nov2017)  Requested for: 98QJW6742; Last   Rx:23Dnh6936 Ordered   7  TraMADol HCl - 50 MG Oral Tablet; Take 1 tablet 4 times daily; Therapy: 14OPR6699 to (Teodora Héctor)  Requested for: 26AYW9545; Last   Rx:67Ycn0339 Ordered   8  TraZODone HCl - 50 MG Oral Tablet; two tablets at bedtime  Requested for: 45AMT5831;   Last Rx:66Mjl4854 Ordered   9  Zolpidem Tartrate 10 MG Oral Tablet; TAKE 1 TABLET AT BEDTIME; Therapy: 46QRR6150 to (Evaluate:06Mjm6582); Last Rx:10Oct2017 Ordered    Family Psych History  Mother    1  Family history of hypertension (V17 49) (Z82 49)  Sister    2  Family history of Bipolar 1 disorder  Brother    3  Family history of CHD (coronary heart disease)  Family History    4  Family history of Hypertension (V17 49)   5  Family history of Thyroid Cancer    Social History    · Being A Social Drinker   · Daily caffeine consumption   · Inadequate exercise (V69 0) (Z72 3)   ·    · Never A Smoker   · Working Full Time    Future Appointments    Date/Time Provider Specialty Site   11/07/2017 12:15 PM Nitesh Rodriges M D   Psychiatry [facility] PARTIAL HOSPITALIZATION   02/21/2018 10:00 AM Thomas Wagner DO Psychiatry [facility] [facility]   11/06/2017 05:45 PM Salud Dennis DO Family [facility]   11/10/2017 11:00 AM Salud Dennis DO Family [facility]     Signatures   Electronically signed by : Alyssia Lopez RN; Nov  3 2017 12:04PM EST                       (Author)

## 2018-01-12 NOTE — PSYCH
[facility] Treatment Plan    [facility] Treatment Plan   AREAS OF NEEDS: Severe depression and anxiety as manifested by suicidal thoughts daily for last six months without a plan or intent, sleep disturbances, poor concentration, decreased energy, agitation, confusion, feeling hopeless and stressed by financial burden of providing for multiple family members living in his house, death of his father in 2016 and a mentor  Recently started taking antidepressant medication prescribed by PCP 10/2017   Date Initiated: 10/11/17     LONG TERM GOAL: 1 0 I will identify three signs that my mood has improved and that I am more productive in my daily life  Date Initiated: 10/11/17   Target Date: 11/8/17   Completion Date: 11/9/17     Date Initiated: 10/11/17    1 1 I will identify three things I need to do daily to take care of myself  Target Date: 10/20/17   Completion Date: 11/9/17   Date Initiated: 10/11/17    1 2 I will identify one situation in my daily life where I want to set a healthier boundaries, and I will name two strategies I can use to begin to set healthy boundaries  Target Date: 10/20/17   Completion Date: 11/9/17   Date Initiated: 10/11/17   Revision Date: 10/20/17   1 3 I will take my medication as ordered and I will raise any questions/concerns I may have regarding my medication when /or if they arise  Target Date: 10/20/17   Completion Date: 11/9/17   Date Initiated: 10/11/17   Revision Date: 10/20/17   1 4 I will identify three supports and state how each of my supports will assist me in my recovery  Target Date: 10/20/17   Completion Date: 11/9/17          7 DAY REVISION: 1 5 I will identify 3 skills I am using to grow confidence in my self and the decisions I am making , 1 6 I will continue to be mindful of my self-care and 3 things I am doing each day to establish a life worth living, 1 7 I will follow a self-designed sleep hygiene plan nightly to increase quality of sleep     Date Initiated: 10/20/17   Revision Date: 17   Target Date: 11/15/17   Completion Date: 17   Date Initiated: 10/20/17   Revision Date: 17   Target Date: 11/15/17   Completion Date: 17   Date Initiated: 17   Revision Date: 17   Target Date: 11/15/17   Completion Date: 17         PSYCHIATRY: Medication management and education  Continue medication management and education   Continue medication management and education  Date Initiated: 10/11/2017   Revision Date: 10/20/2017 , 2017   The person(s) responsible for carrying out the plan is Nellie Trivedi MD    NURSIN ,1 12,1 3,1 4 This RN will provide daily wellness group, five days weekly, to educate Carol Rodrigez on his diagnoses and medications used in treatment  1 3,1 4,1 5,1 6 This RN will continue to provide daily wellness group five days weekly to educate Carol Rodrigez on his diagnoses and medications  1 6,1 7 This RN will continue to provide daily wellness grp and continue offering education to Carol Rodrigez on his wellness and medications  Date Initiated: 10/11/17     Revision Date: 10/20/17, 17     The person(s) responsible for carrying out the plan is Mariano Rivas RN    PSYCHOLOGY: 1 1, 1 2, 1 4 Provide psychotherapy group 5 times per week to allow opportunity for Carol Rodrigez to explore stressors and ways of coping  1 1, 1 2, 1 4 Continue to provide psychotherapy group each program day to encourage Carol Rodrigez to further explore life stressors and healthier ways of coping  1 4, 1 5, 1 6 Continue offering psychotherapy group each day Carol Rodrigez attends program to allow him to further explore his life stressors and to practice healthier coping skills  Date Initiated: 10/11/2017    The person(s) responsible for carrying out the plan is MARIA DE JESUS De Leon LSW     ALLIED THERAPY: 1 1,1 2 Engage Joshua in AT group 5 times daily to encourage development and use of wellness tools to decrease symptoms and promote recovery through meaningful activity  1 5, 1 6 Continue to encourage Obdulio Arango to participate in AT group daily to practice wellness tools within program and transfer to home sharing successes and barriers through healthy task involvement  1 5,1 6 Continue to engage Obdulio Arango in AT groups 3 times a week to reinforce skill practice, successes and challenges as he works toward OP level of care  Date Initiated: 10/11/17   Revision Date: 10/20/17; 11/1/17   The person(s) responsible for carrying out the plan is JOVANY Escobar  CASE MANAGEMENT: 1 0 This CM will meet regularly with Obdulio Arango to assess progress in Program, complete UR, and to assist with D/C planning and supports building for recovery  Revision Date: 10/20/17, 11/1/17   The person(s) responsible for carrying out the plan is Ishmael Landau, RN; JOVANY Escobar  DISCHARGE CRITERIA: I will identify three signs of improvement and I will complete my relapse prevention plan  DISCHARGE PLAN: Medication Monitoring with PCP and working to arrange OP psychiatrist appointment with Dr Bharath Soliz at [place] [address] an OP therapist for follow up and meeting with   [facility] OP therapist, as he has been requested to do by employer  CARL support group attendance for self and partner   Estimated Length of Stay: 10 days   Strengths: empathic, responsible, generous, hard worker   Limitations: "I dont ask for help" "I don't open up to others", I don't set healthy boundaries and wind up feeling overwhelmed and taken advantaged of"   Diagnosis and Treatment Plan explained to patient, patient relates understanding diagnosis and is agreeable to Treatment Plan           CLIENT COMMENTS / Please share your thoughts, feelings, need and/or experiences regarding your treatment plan: _____________________________________________________________________________________________________________________________________________________________________________________________________________________________________________________________________________________________________________________ Date/Time: ______________           Patient Signature: _________________________________ Date/Time: ______________            Signatures   Electronically signed by : Jasmina Holcomb MSWLSW; Oct 11 2017  9:20AM EST                       (Author)    Electronically signed by : ALEXX Carson ; Oct 11 2017  9:49AM EST                       (Author)    Electronically signed by : JOVANY Masters; Oct 11 2017  2:25PM EST                       (Author)    Electronically signed by : Merrill Harley RN; Oct 11 2017  3:12PM EST                       (Author)    Electronically signed by : ALEXX Carson ; Oct 20 2017  8:23AM EST                       (Author)    Electronically signed by : MARTINEZ Lopes; Oct 20 2017 11:37AM EST                       (Author)    Electronically signed by : JOVANY Masters; Oct 20 2017  1:57PM EST                       (Author)    Electronically signed by : JOVANY Masters; Nov 1 2017  2:30PM EST                       (Author)    Electronically signed by : ALEXX Carson ; Nov 2 2017  8:26AM EST                       (Author)    Electronically signed by : MARTINEZ Lopes; Nov 2 2017  8:55AM EST                       (Author)    Electronically signed by : Merrill Harley RN; Nov 2 2017 10:29AM EST                       (Author)    Electronically signed by : Merrill Harley RN; Dec 14 2017  2:41PM EST                       (Author)    Electronically signed by : Merrill Harley RN; Dec 14 2017  2:44PM EST                       (Author)

## 2018-01-12 NOTE — PSYCH
History of Present Illness  [facility] Clinical Progress Note  [facility]:   Specialized Services Documentation - Therapist must complete separate progress note for each specific clinical activity in which the client participated during the day  Case Management Note:   0800 Obdulio Arango is excused due to IOP day off  Ishmael Landau, RN      Active Problems    1  Bulging lumbar disc (722 10) (M51 26)   2  Chronic tension-type headache (339 12) (G44 229)   3  Generalized anxiety disorder (300 02) (F41 1)   4  Grief reaction (309 0) (F43 20)   5  High risk medication use (V58 69) (Z79 899)   6  Hyperlipidemia (272 4) (E78 5)   7  Hypertension (401 9) (I10)   8  Insomnia (780 52) (G47 00)   9  Lateral epicondylitis of right elbow (726 32) (M77 11)   10  Major depressive disorder, recurrent, severe w/o psychotic behavior (296 33) (F33 2)   11  Myofascial pain syndrome (729 1) (M79 1)   12  Need for diphtheria-tetanus-pertussis (Tdap) vaccine (V06 1) (Z23)    Past Medical History    1  History of Herniated nucleus pulposus, L5-S1, right (722 10) (M51 27)   2  History of actinic keratosis (V13 3) (Z87 2)   3  History of low back pain (V13 59) (Z87 39)   4  Denied: History of seizure   5  Denied: History of traumatic injury of head    Allergies    1  No Known Drug Allergies    Current Meds   1  Abilify 5 MG Oral Tablet; Therapy: (Recorded:11Oct2017) to Recorded   2  ARIPiprazole 5 MG Oral Tablet; TAKE 1 TABLET DAILY; Therapy: 37CLS6168 to (Evaluate:10Nov2017)  Requested for: 98KTK5080; Last   Rx:11Oct2017 Ordered   3  BusPIRone HCl - 15 MG Oral Tablet; Take 1 tablet daily; Therapy: 00HFM0539 to (Gail Whitmore)  Requested for: 02Oct2017; Last   Rx:02Oct2017 Ordered   4  Escitalopram Oxalate 10 MG Oral Tablet; Take 1 tablet daily; Therapy: 38BLQ1130 to (Gail Whitmore)  Requested for: 02Oct2017; Last   Rx:02Oct2017 Ordered   5  Fenofibrate 145 MG Oral Tablet; Take 1 tablet daily;    Therapy: 71SMG8875 to (Last Rx:81Mgk6276)  Requested for: 08Xgc4275 Ordered   6  Lisinopril 20 MG Oral Tablet; TAKE 1 TABLET DAILY FOR BLOOD PRESSURE; Therapy: 75FNW5839 to (Evaluate:46Ktf2228)  Requested for: 80CKP0536; Last   Rx:60Vho4415 Ordered   7  TraMADol HCl - 50 MG Oral Tablet; Take 1 tablet 4 times daily; Therapy: 78JNI2716 to ([facility])  Requested for: 66YHP6324; Last   Rx:10Oct2017 Ordered   8  TraZODone HCl - 50 MG Oral Tablet; two tablets at bedtime  Requested for: 70AZX5820;   Last Rx:31Mbd3222 Ordered   9  Zolpidem Tartrate 10 MG Oral Tablet; TAKE 1 TABLET AT BEDTIME; Therapy: 12HAH8636 to (Evaluate:47Ibi4822); Last Rx:10Oct2017 Ordered    Family Psych History  Mother    1  Family history of hypertension (V17 49) (Z82 49)  Sister    2  Family history of Bipolar 1 disorder  Brother    3  Family history of CHD (coronary heart disease)  Family History    4  Family history of Hypertension (V17 49)   5  Family history of Thyroid Cancer    Social History    · Being A Social Drinker   · Daily caffeine consumption   · Inadequate exercise (V69 0) (Z72 3)   ·    · Never A Smoker   · Working Full Time    Future Appointments    Date/Time Provider Specialty Site   11/01/2017 11:45 AM ALEXX Perkins  Psychiatry [facility]   11/03/2017 12:00 PM Kristi Rodriges M D   Psychiatry [facility]   02/21/2018 10:00 AM Carlito Ha DO Psychiatry [facility]   11/06/2017 05:45 PM Zeny Dennis DO [facility]   11/10/2017 11:00 AM Zeny Dennis DO [facility]     Signatures   Electronically signed by : Mariano Rivas RN; Oct 30 2017  3:24PM EST                       (Author)

## 2018-01-12 NOTE — PSYCH
History of Present Illness  [facility] Clinical Progress Note [facility]:   Specialized Services Documentation - Therapist must complete separate progress note for each specific clinical activity in which the client participated during the day  (200) Group Psychotherapy: 7738-3269  Silvio Weber attended psychotherapy group that focused on learning from the past to make better choices in the present  He identified work and finances, as well as repetitive and new thoughts as stressors for today  Silvio Weber somewhat participated in group discussion, expressing disappointment in himself for not being able to function at the level that he used to function  Mild progress made towards goals  Continue psychotherapy group to explore stressors and coping skills  Suha Sharp MSW Intern  Treatment Plan Problem(s): 1 4  Mary Price MSW, LSW     (126) Group Psychotherapy: 9527-3881 Silvio Weber participated in wellness group focused on identifying what a healthy boundary is and how to begin to practice setting healthy boundaries with family, friends, acquaintances and co-workers  Silvio Weber shared that he always felt challenged by setting boundaries with family members  He discussed his nephew that he invited to come and live in his home with his girlfriend and their two young babies and how not setting healthy boundaries from the outset has negatively affected him, his partner, his nephew, and everyone else in the home causing stress, anger and hard feelings  Silvio Weber described how he has begun to set healthy boundaries and how it is coming along  He stated that he is receiving support from his partner as well as his nephew's cooperation so far  Silvio Weber made good progress toward goals  Continue to offer group to educate, and provide peer practice, support and feedback in building wellness skills of setting and maintain healthy boundaries now and in recovery  Treatment Plan Problem(s): 1 1,1 2,1 4   Coty Shaw RN       (982) Education Therapy Goals set - family night for his mom's birthday - use boundaries as needed    Treatment Plan Problem(s): 1 2  Education Therapy Time - 0900 - 0930 Previous goal was met  Readiness to Learning:  He is receptive to learning  There are  no barriers to learning  Learning Assessment Time - 1330 - 1400   Education completed on  illness, aftercare and wellness tools  The teaching method was  verbal  Shared area of learning: Yes  Clinton JOVANY Santiago     (520) Allied Therapy 0987-0417 Maria Elena Grewal actively shared in [facility] group focused on setting boundaries and DBT module Interpersonal Effectiveness  He engaged in task of instrument improvisation  Group explored ways to set boundaries and strengthening of internal boundaries  Group was introduced to GIVE as a way to keep the relationship while boundary setting  He was given hand-outs on topic  Maria Elena Grewal shared that he has trouble using simple and direct language when setting boundaries  Good progress towards goal observed and shared  Continue AT to further encourage setting boundaries to promote wellness  CHAYA Jones  Treatment Plan Problem(s): 1 1, 1 2  Clinton JOVANY Santiago     ( ) Other [id number] completed today with Annamarie Hernandez at [facility] and six days of IOP were approved from 10/25/17 to November 6, 2017  [facility]#[id number]  Gregg Queen RN     Case Management Note:   [id number] Maria Elena Grewal met with this CM to discuss progress in Program and D/C planning  Maria Elena Grewal stated that his partner, Bruno Vegas has seen some improvement in his thinking and behaviors since he has begun to attend [facility]  He stated that he has made some progress on treatment goal of identifying one boundary to set--to engage his nephew in completing a household chore and paying one bill and that this is "somewhat successful" and he continues to work on setting healthier boundaries at home with some success   He stated that he is still experiencing difficulty sleeping and when he cannot sleep he is buying things he does need off internet/TV  He discussed this impulse buying with his partner who is supporting him to practice healthier behaviors such as following routine times to go to bed and accomplish things rather than to "isolate" and "waste time in self-destructive ways such as laying in bed until noon or compulsive shopping  He verbalized fear of "reverting back to bad habits such as above that he acquired before starting the routine of [facility]  Discussed goal of creating routine for days off (IOP days off) when he steps down from PHP to IOP  He will focus on this goal to prevent relapse  TREATMENT SESSION NUMBER: 10   Current suicide risk is low  Medications not changed/added/denied  Adam Roque RN      Active Problems    1  Bulging lumbar disc (722 10) (M51 26)   2  Chronic tension-type headache (339 12) (G44 229)   3  Generalized anxiety disorder (300 02) (F41 1)   4  Grief reaction (309 0) (F43 20)   5  High risk medication use (V58 69) (Z79 899)   6  Hyperlipidemia (272 4) (E78 5)   7  Hypertension (401 9) (I10)   8  Insomnia (780 52) (G47 00)   9  Lateral epicondylitis of right elbow (726 32) (M77 11)   10  Major depressive disorder, recurrent, severe w/o psychotic behavior (296 33) (F33 2)   11  Myofascial pain syndrome (729 1) (M79 1)   12  Need for diphtheria-tetanus-pertussis (Tdap) vaccine (V06 1) (Z23)    Past Medical History    1  History of Herniated nucleus pulposus, L5-S1, right (722 10) (M51 27)   2  History of actinic keratosis (V13 3) (Z87 2)   3  History of low back pain (V13 59) (Z87 39)   4  Denied: History of seizure   5  Denied: History of traumatic injury of head    Allergies    1  No Known Drug Allergies    Current Meds   1  Abilify 5 MG Oral Tablet; Therapy: (Recorded:11Oct2017) to Recorded   2  ARIPiprazole 5 MG Oral Tablet; TAKE 1 TABLET DAILY;    Therapy: 39YBT6551 to (Evaluate:10Nov2017)  Requested for: 99QQN7959; Last Rx: 04RXP3899 Ordered   3  BusPIRone HCl - 15 MG Oral Tablet; Take 1 tablet daily; Therapy: 07PIV0765 to (Minesh Diaz)  Requested for: 02Oct2017; Last   Rx:02Oct2017 Ordered   4  Escitalopram Oxalate 10 MG Oral Tablet; Take 1 tablet daily; Therapy: 25FIY6285 to (Minesh Diaz)  Requested for: 02Oct2017; Last   Rx:02Oct2017 Ordered   5  Fenofibrate 145 MG Oral Tablet; Take 1 tablet daily; Therapy: 95Ota5266 to (Last Rx:27Cvi9278)  Requested for: 53Tjo8117 Ordered   6  Lisinopril 20 MG Oral Tablet; TAKE 1 TABLET DAILY FOR BLOOD PRESSURE; Therapy: 86IJU9248 to (Evaluate:13Nov2017)  Requested for: 21RIA4724; Last   Rx:78Xfh5081 Ordered   7  TraMADol HCl - 50 MG Oral Tablet; Take 1 tablet 4 times daily; [facility]OWL5365 [facility]  Requested for: 94ZPB2305; Last   Rx:10Oct2017 Ordered   8  TraZODone HCl - 50 MG Oral Tablet; two tablets at bedtime  Requested for: 40MYN6589;   Last Rx:41Chj9784 Ordered   9  Zolpidem Tartrate 10 MG Oral Tablet; TAKE 1 TABLET AT BEDTIME; Therapy: 62DMU3356 to (Evaluate:12Wsz6485); Last Rx:10Oct2017 Ordered    Family Psych History  Mother    1  Family history of hypertension (V17 49) (Z82 49)  Sister    2  Family history of Bipolar 1 disorder  Brother    3  Family history of CHD (coronary heart disease)  Family History    4  Family history of Hypertension (V17 49)   5  Family history of Thyroid Cancer    Social History    · Being A Social Drinker   · Daily caffeine consumption   · Inadequate exercise (V69 0) (Z72 3)   ·    · Never A Smoker   · Working Full Time    Future Appointments    Date/Time Provider Specialty Site   10/25/2017 10:45 AM ALEXX Ramirez  Psychiatry [facility]   10/26/2017 10:30 AM ALEXX Ramirez  Psychiatry [facility]   10/27/2017 10:45 AM ALEXX Ramirez   Psychiatry [facility]   11/06/2017 05:45 PM Page, Jozef Carrel, DO Family Medicine [facility]   11/10/2017 11:00 AM Sybil Dennis, DO Family Medicine [facility]     Signatures   Electronically signed by : MARTINEZ Arnold; Oct 24 2017  1:40PM EST                       (Author)    Electronically signed by : MARTINEZ De Leon; Oct 24 2017  1:41PM EST                       (Author)    Electronically signed by : Thom Saleem RN; Oct 24 2017  2:28PM EST                       (Author)    Electronically signed by : CHAYA Santos; Oct 24 2017  2:38PM EST                       (Author)    Electronically signed by : JOVANY Miguel; Oct 24 2017  2:46PM EST                       (Author)    Electronically signed by : Thom Saleem RN; Oct 24 2017  4:00PM EST                       (Author)

## 2018-01-13 VITALS
HEIGHT: 78 IN | WEIGHT: 261 LBS | SYSTOLIC BLOOD PRESSURE: 124 MMHG | HEART RATE: 75 BPM | RESPIRATION RATE: 18 BRPM | DIASTOLIC BLOOD PRESSURE: 86 MMHG | BODY MASS INDEX: 30.2 KG/M2 | TEMPERATURE: 99.4 F

## 2018-01-13 VITALS
BODY MASS INDEX: 30.31 KG/M2 | SYSTOLIC BLOOD PRESSURE: 146 MMHG | WEIGHT: 262 LBS | DIASTOLIC BLOOD PRESSURE: 96 MMHG | HEART RATE: 60 BPM | HEIGHT: 78 IN

## 2018-01-13 VITALS
SYSTOLIC BLOOD PRESSURE: 124 MMHG | DIASTOLIC BLOOD PRESSURE: 70 MMHG | HEIGHT: 78 IN | RESPIRATION RATE: 17 BRPM | TEMPERATURE: 98.2 F | BODY MASS INDEX: 30.08 KG/M2 | WEIGHT: 260 LBS | HEART RATE: 66 BPM

## 2018-01-13 NOTE — PSYCH
Chief Complaint  This is a 38year old male referred by Mc Hoffman, a behavioral health specialist after seen due to severe depression and anxiety  History of Present Illness  Mela Perdomo is a 38year old male referred by Mc Hoffman, a behavioral health specialist after seen in the office due to severe depression and anxiety  He has severe depression, anxiety ,sleep disturbances, poor concentration, decreased energy , agitation , confusion and was feeling hopeless  He has suicidal thoughts daily for the last 6 month without a plan or intent  He has job and family stress related issues  He has multiple family living with him in the house and is a financial burning for him  He states does not do anything to himself because of his mother  Today he feels very depressed, he has suicidal thoughts, denies any plan or intent, he denies any homicidal ideas, plan or intent  He denies any manic episodes  He states that everything started a year ago when his father  because they were stranger for 25 years and they were starting to reconnect His PHQ-9 is 25  He just started to take medications on 10/2/2017  Pre-morbid level of function and History of Present Illness:  Mela Perdomo was referred from his PCP's office by [facility] due to worsening depression (severe) with anxiety and constant thoughts of suicidal ideas but no plan after what he described as "a breakdown on Oct  6th after a meeting with his supervisor and HR due to lack of his usual good performance on the job " Mr Terese Clifton also reported sleep disturbances, poor concentration, decreased energy, agitation, confusion and feeling hopeless, as well as suicidal thoughts daily for the last six months but no intent or plan   He identified the loss of his father last 2016 as beginning a period of increasing depression, and probably before his father's death, since he was going back and forth to his father's home in [place] to visit frequently before his passing  He identified that this was also a financial burden in addition to other financial stressors he has assumed in the past year in offering housing to a number of relatives who now live in his home but are not contributing financially  Reason for evaluation and partial hospitalization as an alternative to inpatient hospitalization:   PHP is medically necessary to prevent IP  hospitalization as depressive symptoms are not piotr at OP level of care  Milieu therapy to address wellness tools, connection to supports and to explore stressors in group therapy, case management and psychiatric medication monitoring  ELOS 10 treatment days  Previous Psychiatric/psychological treatment/year: Denied  Current Psychiatrist/Therapist: None/Saw Ms Harris at [facility] in [facility] on 10/10/17 for initial assessment through his EAP Program    Outpatient and/or Partial and Other [employer] Used (CTT, ICM, VNA): Inpatient: Denied, Outpatient: Denied and Partial: Denied  Problem Assessment:   SOCIAL/VOCATION:   Family Constellation (include parents, relationship with each and pertinent Psych/Medical History): Mother: Alive - Supportive and living with Saul Vasquez and other family members in his home  Spouse: Mic lives with patient - supportive of patient to get help  They have been together more than 20 plus years  Father:  2016 -  from [facility] [address], [address] mother since he was 1 y/o  He was remarried and living in [place]  Remarried with wife and another adult child  Alcoholic for most of his life  Children: Sister is bipolar and both she and her children are now living in [facility] [address], [address] home including his nephew 25 y/o and his GF 20 y/o and they have two children 3and 3years old and his niecee 20 y/o also living in his home      Siblin half brothers and one half sister who is bipolar with thyroid CA and can't work (See under [address])   The patient relates best to Astrid Reynolds but I don't talk about my issues with him  He lives with  He does not live alone  Domestic Violence: No past history of domestic violence  The patient is not currently experiencing domestic violence  There is not suspected domestic violence  There is no history of child abuse  Denied  There is no history of sexual abuse  Denied  If yes, options/resources discussed  Denied   Additional Comments related to family/relationships/peer support: Lots of people living in home--too many Nelly Salcedo states  He has not set healthy limits on relatives living in his home and now feels overwhelmed by having financial responsibilities for all these individuals as well as no longer having finances to maintain the cost of carrying all these individuals  School or Work History (strengths/limitations/needs: Works for last five and one half years at [employer] and before that another [employer] for 20 years  He said he takes pride in his work and is a hard worker  He prides himself on being organized and perfectionistic but this no longer works for him  His highest grade level achieved was  Some college , Moved to [place] from [place] for work in 2006   history includes Denied  Financial status includes They were great until one year ago when family moved in  He built house and moved in 2015 and basement was unfinished and following May niece graduated  and he invited her to move in with them due to poor living conditions  Sister and niece moved in then his nephew and GF and their children moved in  All his money is tied up in home  Anya Green LEISURE ASSESSMENT (Include past and present hobbies/interests and level of involvement (Ex: Group/Club Affiliations): Started so many projects but doesn't finish anything, likes to travel and has traveled widely, not productive at all now  His primary language is  [place]  Ethnic considerations are Originally from [place]   Religions affiliations and level of involvement - Denied   Spirituality and warren have not helped him cope with difficult situations in his life  FUNCTIONAL STATUS: There has been a recent change in the patient's ability to do the following: driving   He does not need [place]  Level of Assistance Needed/By Whom?: Independent  The patient learns best by  listening and writing things down, lists, notes  SUBSTANCE ABUSE ASSESSMENT: no current substance abuse and no past substance abuse  Substance/Route/Age/Amount/Frequency/Last Use: Drank hard liquor when younger socially with a high tolerance to ETOH  Last time he drank was last year after death of father  Father was an alcoholic  Malen Emile states when he drank he would drink to get drunk so he now stays away from drinking and social situations with drinking actively involved  Stays away from alcohol because it "no longer does what it used to do for me "  No previous detox/rehab treatment  Denied  HEALTH ASSESSMENT: He has not lost 10 lbs or more in the last 6 months without trying  He has not had decreased appetite for 5 days or more  He has not gained 10 lbs or more in the last 6 months without trying  no nausea  no vomiting  no diarrhea  no referral to PCP needed  no referral to nutritionist needed  no pregnancy  He is not receiving prenatal care  not referred to PCP  Current PCP: Dr Lucila Valle  PCP notified  LEGAL: No Mental Health Advance Directive or Power of  on file  He does not want an information packet about Mental Health Advance Directives  Diagnosis and Treatment Plan explained to patient, patient relates understanding diagnosis and is agreeable to Treatment Plan  Prognosis: Strengths - Responsible, caring (even over-caring)     Challenges - Severe OCD uses check marks, keeps lists, has other organizing habits compulsively  He is confused b/c his system no longer worked at his job  He is very upset by this   Things left undone and he has no memory of not finishing things  Poor concentration, poor memory, takes all day to get simple things done  Perfectionistic to a fault  "I don't get the respect at home I need to from my nephew and his GF "  Review of Systems  depression, no desire to continue living, suicidal and decreased functioning ability  Constitutional: no fever or chills, feels well, no tiredness, no recent weight loss or weight gain  ENT: no complaints of earache, no loss of hearing, no nosebleeds or nasal discharge, no sore throat or hoarseness  Cardiovascular: no complaints of slow or fast heart rate, no chest pain, no palpitations, no leg claudication or lower extremity edema  Respiratory: no complaints of shortness of breath, no wheezing or cough, no dyspnea on exertion, no orthopnea or PND  Gastrointestinal: no complaints of abdominal pain, no constipation, no nausea or vomiting, no diarrhea or bloody stools  Genitourinary: no complaints of dysuria or incontinence, no hesitancy, no nocturia, no genital lesion, no inadequacy of penile erection  Musculoskeletal: myalgias  Integumentary: no complaints of skin rash or lesion, no itching or dry skin, no skin wounds  Neurological: numbness and tingling  Other Symptoms: Endocrine is negative  ROS reviewed  Past Psychiatric History    Past Psychiatric History: He has depression and anxiety; he denies any impatient psychiatric admissions  He denies any history of suicide attempt, denies any violence history  He follows up with his PCP  He has been on Buspar ,Lexapro, Xanax and Ambien, Celexa and Trazodone  Substance Abuse Hx    Substance Abuse History: He is a social drinker , denies drugs and tobacco           Active Problems     1  Bulging lumbar disc (722 10) (M51 26)   2  Chronic tension-type headache (339 12) (G44 229)   3  Grief reaction (309 0) (F43 20)   4  Hyperlipidemia (272 4) (E78 5)   5  Hypertension (401 9) (I10)   6  Insomnia (780 52) (G47 00)   7  Lateral epicondylitis of right elbow (726 32) (M77 11)   8  Myofascial pain syndrome (729 1) (M79 1)   9  Need for diphtheria-tetanus-pertussis (Tdap) vaccine (V06 1) (Z23)    Adjustment disorder (309 9) (F43 20)       MDD (major depressive disorder) (296 20) (F32 9)          Past Medical History    1  History of Herniated nucleus pulposus, L5-S1, right (722 10) (M51 27)   2  History of actinic keratosis (V13 3) (Z87 2)   3  History of low back pain (V13 59) (Z87 39)    The active problems and past medical history were reviewed and updated today  Surgical History    Denies any     The surgical history was reviewed and updated today  Allergies    1  No Known Drug Allergies    Current Meds   1  ALPRAZolam 0 5 MG Oral Tablet; take 1-2 tabs as needed  twice daily; Therapy: [id number] to (Last Rx:85Zaa2819) Ordered   2  BusPIRone HCl - 15 MG Oral Tablet; Take 1 tablet daily; Therapy: 17AUM8871 to ([facility])  Requested for: 87KRE9177; Last   Rx:[id number] Ordered   3  Escitalopram Oxalate 10 MG Oral Tablet; Take 1 tablet daily; Therapy: 34HPP0955 to ([facility])  Requested for: 02Oct2017; Last   Rx:[id number] Ordered   4  Fenofibrate 145 MG Oral Tablet; Take 1 tablet daily; Therapy: 97Fbq5980 to (Last Rx:67Das2148)  Requested for: 96Gkc0836 Ordered   5  Lisinopril 20 MG Oral Tablet; TAKE 1 TABLET DAILY FOR BLOOD PRESSURE; Therapy: 31IYZ7264 to (Evaluate:13Nov2017)  Requested for: 60HOE4123; Last   Rx:[id number] Ordered   6  TraMADol HCl - 50 MG Oral Tablet; Take 1 tablet 4 times daily; Therapy: 39KOI9790 to ([facility])  Requested for: 33FRK1803; Last   Rx:35Ggh7622 Ordered   7  TraZODone HCl - 50 MG Oral Tablet; two tablets at bedtime  Requested for: 47DOF0393;   Last Rx:36Hsh7884 Ordered   8  Zolpidem Tartrate 10 MG Oral Tablet; TAKE 1 TABLET AT BEDTIME; Therapy: 43XRR0574 to (Evaluate:83Rjp0731);  Last Rx:10Oct2017 Ordered    The medication list was reviewed and updated today  Family Psych History  Mother    1  Family history of hypertension (V17 49) (Z82 49)  Brother    2  Family history of CHD (coronary heart disease)  Family History    3  Family history of Hypertension (V17 49)   4  Family history of Thyroid Cancer    The family history was reviewed and updated today  Sister has bipolar,   Social History     · Being A Social Drinker   · Inadequate exercise (V69 0) (Z72 3)   · Never A Smoker   · Working Full Time  The social history was reviewed and updated today  He is , lives with his family, is employed and finished high school  He denies any legal issues No  history  Marital History - Single             History Of Phys/Sex Abuse Or Perpetration    History Of Phys/Sex Abuse or Perpetration: He denies any history of physical abuse, denies history sexual abuse  Physical Exam    Appearance: was calm and cooperative, adequate hygiene and grooming and good eye contact  Observed mood: depressed and anxious  Observed mood: affect was constricted  Speech: a normal rate  Thought processes: coherent/organized  Hallucinations: no hallucinations present  Thought Content: no delusions  Abnormal Thoughts: The patient has passive/fleeting thoughts of suicide, but no homicidal thoughts  Orientation: The patient is oriented to person, place and time  Recent and Remote Memory: short term memory intact and long term memory intact  Attention Span And Concentration: concentration impaired  Insight: Insight intact  Judgment: His judgment was intact  Muscle Strength And Tone  Muscle strength and tone were normal  Normal gait and station  Language: no difficulty naming common objects, no difficulty repeating a phrase and no difficulty writing a sentence  Fund of knowledge: Patient displays adequate knowledge of current events, adequate fund of knowledge regarding past history and adequate fund of knowledge regarding vocabulary     The patient is experiencing moderate to severe pain  On a scale of 0 - 10 the pain severity is a 7  Treatment Recommendations: 1  Admit to [place] 2  Medication Management 3  Group Therapy  Risks, Benefits And Possible Side Effects Of Medications: Risks, benefits, and possible side effects of medications explained to patient and patient verbalizes understanding  He is not taking control substance      DSM    Provisional Diagnosis: 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 PARTIAL HOSPITALIZATION   10/13/2017 10:00 AM ALEXX Virgen  Psychiatry [facility]   11/06/2017 05:45 PM Ollie Dennis,  Family Medicine [facility]     Subjective  ADMIT TO: Partial Hospitalization 5 x per week for 15 days  Vital signs routine  Diet: regular  Group Psychotherapy 9 x per week    Allied Therapy Group 6 x per week     Diagnosis: F 33 2, F 41 1  Medications: As per medication list    âI certify that the continuation of Partial Hospitalization services is medically necessary to improve and/or maintain the patient's condition and functional level, and to prevent relapse or hospitalization, and that this could not be done at a less intensive level of care  â     Physician Signature: Bao Rodriges MD     Nurse Signature: Marilin Kelley RN      Signatures   Electronically signed by : Marilin Kelley RN; Oct 11 2017 11:04AM EST                       (Author)    Electronically signed by : Marilin Kelley RN; Oct 11 2017  3:00PM EST                       (Author)    Electronically signed by : Marilin Kelley RN; Oct 11 2017  3:20PM EST (Author)    Electronically signed by : ALEXX Mart ; Oct 11 2017  3:31PM EST                       (Author)    Electronically signed by : Coty Shaw RN; Oct 11 2017  3:59PM EST                       (Author)

## 2018-01-13 NOTE — PSYCH
Assessment    1  Major depressive disorder, recurrent, severe w/o psychotic behavior (296 33) (F33 2)   2  Generalized anxiety disorder (300 02) (F41 1)   3  Family history of Bipolar 1 disorder : Sister   4     5  Daily caffeine consumption    Plan    1  ARIPiprazole 5 MG Oral Tablet; TAKE 1 TABLET DAILY    [facility] Physician's Orders  ADMIT TO: Partial Hospitalization 5 x per week for 15 days  Vital signs routine  Diet: regular  Group Psychotherapy 9 x per week    Allied Therapy Group 6 x per week     Diagnosis: F 33 2, F 41 1  Medications: As per medication list    âI certify that the continuation of Partial Hospitalization services is medically necessary to improve and/or maintain the patient's condition and functional level, and to prevent relapse or hospitalization, and that this could not be done at a less intensive level of care  â     Physician Signature: Ana Rodriges MD     Nurse Signature: Joanne Sanchez RN      Chief Complaint  This is a 38year old male referred by Annette Nava, a behavioral health specialist after seen due to severe depression and anxiety  History of Present Illness  Brandy Flood is a 38year old male referred by Annette Nava, a behavioral health specialist after seen in the office due to severe depression and anxiety  He has severe depression, anxiety ,sleep disturbances, poor concentration, decreased energy , agitation , confusion and was feeling hopeless  He has suicidal thoughts daily for the last 6 month without a plan or intent  He has job and family stress related issues  He has multiple family living with him in the house and is a financial burning for him  He states does not do anything to himself because of his mother  Today he feels very depressed, he has suicidal thoughts, denies any plan or intent, he denies any homicidal ideas, plan or intent  He denies any manic episodes   He states that everything started a year ago when his father  because they were stranger for 25 years and they were starting to reconnect His PHQ-9 is 25  He just started to take medications on 10/2/2017  Review of Systems  depression, no desire to continue living, suicidal and decreased functioning ability  Constitutional: no fever or chills, feels well, no tiredness, no recent weight loss or weight gain  ENT: no complaints of earache, no loss of hearing, no nosebleeds or nasal discharge, no sore throat or hoarseness  Cardiovascular: no complaints of slow or fast heart rate, no chest pain, no palpitations, no leg claudication or lower extremity edema  Respiratory: no complaints of shortness of breath, no wheezing or cough, no dyspnea on exertion, no orthopnea or PND  Gastrointestinal: no complaints of abdominal pain, no constipation, no nausea or vomiting, no diarrhea or bloody stools  Genitourinary: no complaints of dysuria or incontinence, no hesitancy, no nocturia, no genital lesion, no inadequacy of penile erection  Musculoskeletal: myalgias  Integumentary: no complaints of skin rash or lesion, no itching or dry skin, no skin wounds  Neurological: numbness and tingling  Other Symptoms: Endocrine is negative  ROS reviewed  Past Psychiatric History    Past Psychiatric History: He has depression and anxiety; he denies any impatient psychiatric admissions  He denies any history of suicide attempt, denies any violence history  He follows up with his PCP  He has been on Buspar ,Lexapro, Xanax and Ambien, Celexa and Trazodone  Substance Abuse Hx    Substance Abuse History: He is a social drinker , denies drugs and tobacco           Active Problems    1  Bulging lumbar disc (722 10) (M51 26)   2  Chronic tension-type headache (339 12) (G44 229)   3  Grief reaction (309 0) (F43 20)   4  Hyperlipidemia (272 4) (E78 5)   5  Hypertension (401 9) (I10)   6  Insomnia (780 52) (G47 00)   7   Lateral epicondylitis of right elbow (726 32) (M77 11)   8  Myofascial pain syndrome (729 1) (M79 1)   9  Need for diphtheria-tetanus-pertussis (Tdap) vaccine (V06 1) (Z23)    Past Medical History    1  History of Herniated nucleus pulposus, L5-S1, right (722 10) (M51 27)   2  History of actinic keratosis (V13 3) (Z87 2)   3  History of low back pain (V13 59) (Z87 39)   4  Denied: History of seizure   5  Denied: History of traumatic injury of head    The active problems and past medical history were reviewed and updated today  Surgical History  Denies any     The surgical history was reviewed and updated today  Allergies    1  No Known Drug Allergies    Current Meds   1  Abilify 5 MG Oral Tablet (ARIPiprazole); Therapy: (Recorded:11Oct2017) to Recorded   2  BusPIRone HCl - 15 MG Oral Tablet; Take 1 tablet daily; Therapy: 02XFT3726 to (Leann Richter)  Requested for: 02ZRJ1583; Last   Rx:10Gmt7366 Ordered   3  Escitalopram Oxalate 10 MG Oral Tablet; Take 1 tablet daily; Therapy: 32GKD2183 to (Leann Richter)  Requested for: 02Oct2017; Last   Rx:90Xtk7859 Ordered   4  Fenofibrate 145 MG Oral Tablet; Take 1 tablet daily; Therapy: 49Kao0371 to (Last Rx:02Gbh8084)  Requested for: 54Sgg8106 Ordered   5  Lisinopril 20 MG Oral Tablet; TAKE 1 TABLET DAILY FOR BLOOD PRESSURE; Therapy: 60QSS5630 to (Evaluate:13Nov2017)  Requested for: 35OQH3125; Last   Rx:93Ahc5041 Ordered   6  TraMADol HCl - 50 MG Oral Tablet; Take 1 tablet 4 times daily; Therapy: 67JOI6799 to (Kerrie Marcus)  Requested for: 41SUY2661; Last   Rx:32Zmu2659 Ordered   7  TraZODone HCl - 50 MG Oral Tablet; two tablets at bedtime  Requested for: 75IJD4892;   Last Rx:79Vlw3886 Ordered   8  Zolpidem Tartrate 10 MG Oral Tablet; TAKE 1 TABLET AT BEDTIME; Therapy: 14JON5257 to (Evaluate:90Vzw8627); Last Rx:10Oct2017 Ordered    The medication list was reviewed and updated today  Family Psych History  Mother    1   Family history of hypertension (V17 49) (Z82 49)  Sister    2  Family history of Bipolar 1 disorder  Brother    3  Family history of CHD (coronary heart disease)  Family History    4  Family history of Hypertension (V17 49)   5  Family history of Thyroid Cancer  Sister has bipolar,   The family history was reviewed and updated today  Social History    · Being A Social Drinker   · Daily caffeine consumption   · Inadequate exercise (V69 0) (Z72 3)   ·    · Never A Smoker   · Working Full Time  The social history was reviewed and updated today  He is , lives with his family, is employed and finished high school  He denies any legal issues No  history  History Of Phys/Sex Abuse Or Perpetration    History Of Phys/Sex Abuse or Perpetration: He denies any history of physical abuse, denies history sexual abuse  Vitals  Signs   Recorded: 26PBP9724 09:50AM   Temperature: 99 4 F, Oral  Heart Rate: 75, L Radial  Pulse Quality: Normal, L Radial  Respiration Quality: Normal  Respiration: 18  Systolic: 470, LUE, Sitting  Diastolic: 86, LUE, Sitting  Height: 6 ft 6 in  Weight: 261 lb   BMI Calculated: 30 16  BSA Calculated: 2 53    Physical Exam    Appearance: was calm and cooperative, adequate hygiene and grooming and good eye contact  Observed mood: depressed and anxious  Observed mood: affect was constricted  Speech: a normal rate  Thought processes: coherent/organized  Hallucinations: no hallucinations present  Thought Content: no delusions  Abnormal Thoughts: The patient has passive/fleeting thoughts of suicide, but no homicidal thoughts  Orientation: The patient is oriented to person, place and time  Recent and Remote Memory: short term memory intact and long term memory intact  Attention Span And Concentration: concentration impaired  Insight: Insight intact  Judgment: His judgment was intact  Muscle Strength And Tone  Muscle strength and tone were normal  Normal gait and station     Language: no difficulty naming common objects, no difficulty repeating a phrase and no difficulty writing a sentence  Fund of knowledge: Patient displays adequate knowledge of current events, adequate fund of knowledge regarding past history and adequate fund of knowledge regarding vocabulary  The patient is experiencing moderate to severe pain  On a scale of 0 - 10 the pain severity is a 7  Treatment Recommendations: 1  Admit to [place] 2  Medication Management 3  Group Therapy  Risks, Benefits And Possible Side Effects Of Medications: Risks, benefits, and possible side effects of medications explained to patient and patient verbalizes understanding  He is not taking control substance      DSM    Provisional Diagnosis: Major Depression recurrent severe without psychotic symptoms    Generalized Anxiety disorder     End of Encounter Meds    1  Fenofibrate 145 MG Oral Tablet; Take 1 tablet daily; Therapy: 26Apr2016 to (Last Rx:64Are8229)  Requested for: 08Zqp5463 Ordered    2  Lisinopril 20 MG Oral Tablet; TAKE 1 TABLET DAILY FOR BLOOD PRESSURE; Therapy: 95DOB1522 to (Evaluate:13Nov2017)  Requested for: 22STS7386; Last   Rx:89Grd4375 Ordered    3  TraZODone HCl - 50 MG Oral Tablet; two tablets at bedtime  Requested for: 06HYA9699;   Last Rx:58Vkx8183 Ordered   4  Zolpidem Tartrate 10 MG Oral Tablet; TAKE 1 TABLET AT BEDTIME; Therapy: 04BBL1583 to (Evaluate:15Yeu5564); Last Rx:10Oct2017 Ordered    5  ARIPiprazole 5 MG Oral Tablet; TAKE 1 TABLET DAILY; Therapy: 78RTJ9126 to (Evaluate:10Nov2017); Last Rx:93Qkf2550 Ordered    6  BusPIRone HCl - 15 MG Oral Tablet; Take 1 tablet daily; Therapy: 35UOX5205 to (Arthor Given)  Requested for: 02Oct2017; Last   Rx:02Oct2017 Ordered   7  Escitalopram Oxalate 10 MG Oral Tablet; Take 1 tablet daily; Therapy: 59MPH3465 to (Arthor Given)  Requested for: 02Oct2017; Last   Rx:02Oct2017 Ordered    8  TraMADol HCl - 50 MG Oral Tablet;  Take 1 tablet 4 times daily; Therapy: 51KNL1990 to (Ricardo Lyon)  Requested for: 80YWB1467; Last   Rx:10Oct2017 Ordered    9  Abilify 5 MG Oral Tablet (ARIPiprazole); Therapy: (Recorded:11Oct2017) to Recorded    Future Appointments    Date/Time Provider Specialty Site   10/12/2017 10:00 AM ALEXX Ceballos  Psychiatry [facility] PARTIAL HOSPITALIZATION   10/13/2017 10:00 AM ALEXX Ceballos   Psychiatry [facility] PARTIAL HOSPITALIZATION   11/06/2017 05:45 PM Mar Dennis,  Family Medicine [facility]     Signatures   Electronically signed by : ALEXX Lorenzo ; Oct 11 2017 10:41AM EST                       (Author)    Electronically signed by : Warren Saleem RN; Oct 11 2017 10:43AM EST                       (Author)    Electronically signed by : ALEXX Lorenzo ; Oct 11 2017 10:45AM EST                       (Author)    Electronically signed by : ALEXX Lorenzo ; Oct 11 2017 10:45AM EST                       (Author)

## 2018-01-13 NOTE — PSYCH
History of Present Illness  [facility] Clinical Progress Note  [facility]:   Specialized Services Documentation - Therapist must complete separate progress note for each specific clinical activity in which the client participated during the day  Case Management Note:   0800 Meg Arriaza is excused today IOP day off  Medications not changed/added/denied  Taylor Thompson RN      Active Problems    1  Bulging lumbar disc (722 10) (M51 26)   2  Chronic tension-type headache (339 12) (G44 229)   3  Generalized anxiety disorder (300 02) (F41 1)   4  Grief reaction (309 0) (F43 20)   5  High risk medication use (V58 69) (Z79 899)   6  Hyperlipidemia (272 4) (E78 5)   7  Hypertension (401 9) (I10)   8  Insomnia (780 52) (G47 00)   9  Lateral epicondylitis of right elbow (726 32) (M77 11)   10  Major depressive disorder, recurrent, severe w/o psychotic behavior (296 33) (F33 2)   11  Myofascial pain syndrome (729 1) (M79 1)   12  Need for diphtheria-tetanus-pertussis (Tdap) vaccine (V06 1) (Z23)    Past Medical History    1  History of Herniated nucleus pulposus, L5-S1, right (722 10) (M51 27)   2  History of actinic keratosis (V13 3) (Z87 2)   3  History of low back pain (V13 59) (Z87 39)   4  Denied: History of seizure   5  Denied: History of traumatic injury of head    Allergies    1  No Known Drug Allergies    Current Meds   1  Abilify 5 MG Oral Tablet; Therapy: (Recorded:11Oct2017) to Recorded   2  ARIPiprazole 5 MG Oral Tablet; TAKE 1 TABLET DAILY; Therapy: 18JHU4915 to (Evaluate:10Nov2017)  Requested for: 89OQH6619; Last   Rx:11Oct2017 Ordered   3  BusPIRone HCl - 15 MG Oral Tablet; Take 1 tablet daily; Therapy: 05KCK8729 to (Gianluca Chowdhury)  Requested for: 02Oct2017; Last   Rx:02Oct2017 Ordered   4  Escitalopram Oxalate 10 MG Oral Tablet; Take 1 tablet daily; Therapy: 55MZK9364 to (Gianluca Chowdhury)  Requested for: 02Oct2017; Last   Rx:02Oct2017 Ordered   5   Fenofibrate 145 MG Oral Tablet; Take 1 tablet daily; Therapy: 80Ktu2475 to (Last Rx:88Uzp4918)  Requested for: 54Lzp4434 Ordered   6  Lisinopril 20 MG Oral Tablet; TAKE 1 TABLET DAILY FOR BLOOD PRESSURE; Therapy: 08KES0781 to (Evaluate:13Nov2017)  Requested for: 67PRJ9142; Last   Rx:19Jsn4532 Ordered   7  TraMADol HCl - 50 MG Oral Tablet; Take 1 tablet 4 times daily; Therapy: 20XBQ1414 to (Otilio Marin)  Requested for: 21RRT0754; Last   Rx:10Oct2017 Ordered   8  TraZODone HCl - 50 MG Oral Tablet; two tablets at bedtime  Requested for: 08RKK2678;   Last Rx:63Cea0945 Ordered   9  Zolpidem Tartrate 10 MG Oral Tablet; TAKE 1 TABLET AT BEDTIME; Therapy: 96AJL6740 to (Evaluate:82Ctw4153); Last Rx:50Ovy4532 Ordered    Family Psych History  Mother    1  Family history of hypertension (V17 49) (Z82 49)  Sister    2  Family history of Bipolar 1 disorder  Brother    3  Family history of CHD (coronary heart disease)  Family History    4  Family history of Hypertension (V17 49)   5  Family history of Thyroid Cancer    Social History    · Being A Social Drinker   · Daily caffeine consumption   · Inadequate exercise (V69 0) (Z72 3)   ·    · Never A Smoker   · Working Full Time    Future Appointments    Date/Time Provider Specialty Site   11/03/2017 12:00 PM Josie Rodriges M D   Psychiatry [facility] HOSPITALIZATION   02/21/2018 10:00 AM Margo Harris DO [facility]iatry [facility]   11/06/2017 05:45 PM Paola Dennis DO [facility]   11/10/2017 11:00 AM Paola Dennis DO [facility]     Signatures   Electronically signed by : Dawson Hodgkins, RN; Nov 2 2017  8:28AM EST                       (Author)

## 2018-01-14 NOTE — PSYCH
History of Present Illness  [facility] Clinical Progress Note [facility]:   Specialized Services Documentation - Therapist must complete separate progress note for each specific clinical activity in which the client participated during the day  (849) Group Psychotherapy: [id number] Reyes To participated in weekly wellness group to discuss what particular area of wellness that has been worked on up to today in Program and choice of area to continue working on for recovery as targeted in treatment plan, by choice or in WRAP  Reyes To identified that he has targeted his vocational area as one he would like to work on in [employer]  He shared that 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 and he feels he no longer wants to do the work he is doing now as it has become stressful dealing with making decisions regarding individual's problems and money  He stated that he has become numb and feels overwhelmed by his own problems that he cannot deal with other's problems and "this is not me " Reyes To gave encouragement to a peer who was also sharing a similar challenge in their life during discussion  Reyes To made good progress toward goal  Continue to offer weekly wellness as an strategy to offer opportunity to focus on goals and identify areas of goal achievement and need  Treatment Plan Problem(s): 1 1,1 2, 1 4  Guerda Kerr RN     (525) Group Psychotherapy: ([id number]) Reyes To participated in psychotherapy group focused on changing negative thinking and advocating for one's needs  Reyes To identified his living situation as a stressor today  He actively engaged in group discussion, talking about how he feels overwhelmed and stuck in his own home with so many people living there and depending on him  He feels taken advantage of by his nephew and his girlfriend, as they are not contributing to the household like they said they would   Reyes To noted that he realizes he is enabling their behavior by paying their bills and doing their chores when he gets tired of waiting for them to do so  Group discussed ways to deal with negative thinking and difficult people in their lives, including setting firm boundaries and being assertive in expressing needs  Some moderate progress toward goals  Continue psychotherapy group to encourage Naaman Paget to explore stressors and coping  [place] Dharmesh MSW, LSW       (150) Education Therapy Goals set - work on putting vacation photos in an [facility] Problem(s): 1 1  Education Therapy Time - 0900 - 0930 Previous goal was met  Readiness to Learning:  He is receptive to learning  There are  no barriers to learning  Learning Assessment Time - 1330 - 1400   Education completed on  illness and wellness tools  The teaching method was  verbal  Shared area of learning: Yes  JOVANY Jaramillo     (852) Allied Therapy [id number] Naaman Paget actively shared in [facility] group exploring DBT emotional regulation - understanding, describing and managing emotions with a focus on anger  He engaged in task exploring effective versus ineffective ways to manage intense emotions in addition to skills to refocus in the moment  He appeared attentive and observed taking notes agreeing with much but not sharing as much  He appeared invested in learning about topic and expressed how impulsivity related to his anger is ineffective at home  Group explored the benefits of emotions that can lead to learning about ourselves and productive changes we can make in our lives  Some beginning work toward goal noted  Continue AT to explore personal role in skill development, awareness and practice  Treatment Plan Problem(s): 1 2  JOVANY Jaramillo       Case Management Note:   Carmelo Wang met with this CM/RN and discussed progress in Program  He stated that he has begun taking Abilify (generic) and denied side effects from this or any of his medications   He was given orders for labs to be drawn by Program Psychiatrist and he stated he will try to do same at a [facility] lab near his home  He stated that he is feeling the Program will help him but discussed how disorganized he feels and unable to handle all the responsibilities and things he previously accomplished before he became so depressed such as bill paying and sending cards for relative's birthdays  He stated that he prided himself in being highly organized but he is stressed by realizing that he no longer is and forgetting to do things  He also discussed learning to start to communicate with his partner, Alliesanjana Backers rather than "talk at him " Nancie Allred denied SI and HI as well as any side effects from medications  TREATMENT SESSION NUMBER: 3   Current suicide risk is low  Medications not changed/added/denied  Batool Dia RN      Active Problems    1  Bulging lumbar disc (722 10) (M51 26)   2  Chronic tension-type headache (339 12) (G44 229)   3  Generalized anxiety disorder (300 02) (F41 1)   4  Grief reaction (309 0) (F43 20)   5  Hyperlipidemia (272 4) (E78 5)   6  Hypertension (401 9) (I10)   7  Insomnia (780 52) (G47 00)   8  Lateral epicondylitis of right elbow (726 32) (M77 11)   9  Major depressive disorder, recurrent, severe w/o psychotic behavior (296 33) (F33 2)   10  Myofascial pain syndrome (729 1) (M79 1)   11  Need for diphtheria-tetanus-pertussis (Tdap) vaccine (V06 1) (Z23)    Past Medical History    1  History of Herniated nucleus pulposus, L5-S1, right (722 10) (M51 27)   2  History of actinic keratosis (V13 3) (Z87 2)   3  History of low back pain (V13 59) (Z87 39)   4  Denied: History of seizure   5  Denied: History of traumatic injury of head    Allergies    1  No Known Drug Allergies    Current Meds   1  Abilify 5 MG Oral Tablet; Therapy: (Recorded:11Oct2017) to Recorded   2  ARIPiprazole 5 MG Oral Tablet; TAKE 1 TABLET DAILY; Therapy: 40ZGC4159 to (Evaluate:10Nov2017)  Requested for: 83YZT7748; Last   Rx:11Oct2017 Ordered   3  BusPIRone HCl - 15 MG Oral Tablet; Take 1 tablet daily; Therapy: 55GBK5924 to (Clearance Reveal)  Requested for: 02Oct2017; Last   Rx:02Oct2017 Ordered   4  Escitalopram Oxalate 10 MG Oral Tablet; Take 1 tablet daily; Therapy: 13GIK8308 to (Clearance Reveal)  Requested for: 02Oct2017; Last   Rx:02Oct2017 Ordered   5  Fenofibrate 145 MG Oral Tablet; Take 1 tablet daily; Therapy: 60Ofj9195 to (Last Rx:77Hxz6783)  Requested for: 43Ewk2369 Ordered   6  Lisinopril 20 MG Oral Tablet; TAKE 1 TABLET DAILY FOR BLOOD PRESSURE; Therapy: 26QYZ4985 to (Evaluate:13Nov2017)  Requested for: 42JTN6961; Last   Rx:19Lfe8902 Ordered   7  TraMADol HCl - 50 MG Oral Tablet; Take 1 tablet 4 times daily; Therapy: 70AYZ1728 to (Munir Patel)  Requested for: 44MVM4361; Last   Rx:10Oct2017 Ordered   8  TraZODone HCl - 50 MG Oral Tablet; two tablets at bedtime  Requested for: 03DKO5095;   Last Rx:26Zoc6796 Ordered   9  Zolpidem Tartrate 10 MG Oral Tablet; TAKE 1 TABLET AT BEDTIME; Therapy: 42VBI7263 to (Evaluate:27Rcz5522); Last Rx:10Oct2017 Ordered    Family Psych History  Mother    1  Family history of hypertension (V17 49) (Z82 49)  Sister    2  Family history of Bipolar 1 disorder  Brother    3  Family history of CHD (coronary heart disease)  Family History    4  Family history of Hypertension (V17 49)   5  Family history of Thyroid Cancer    Social History    · Being A Social Drinker   · Daily caffeine consumption   · Inadequate exercise (V69 0) (Z72 3)   ·    · Never A Smoker   · Working Full Time    Future Appointments    Date/Time Provider Specialty Site   10/16/2017 11:30 AM ALEXX Zepeda  Psychiatry [facility] PARTIAL HOSPITALIZATION   10/17/2017 11:15 AM ALEXX Zepeda  Psychiatry [facility] PARTIAL HOSPITALIZATION   10/18/2017 11:15 AM ALEXX Zepeda  Psychiatry [facility] PARTIAL HOSPITALIZATION   10/19/2017 11:00 AM ALEXX Zepeda   [facility] HOSPITALIZATION   10/20/2017 11:00 AM Kristi Rodriges M D   Psychiatry [facility] PARTIAL HOSPITAL[facility]   11/06/2017 05:45 PM Zeny Dennis,  Family Medicine [facility]     Signatures   Electronically signed by : JOVANY Hauser; Oct 13 2017  2:12PM EST                       (Author)    Electronically signed by : Mariano Rivas RN; Oct 13 2017  2:37PM EST                       (Author)    Electronically signed by : Mariano Rivas RN; Oct 13 2017  2:49PM EST                       (Author)    Electronically signed by : MARTINEZ De Leon; Oct 13 2017  3:27PM EST                       (Author)

## 2018-01-14 NOTE — PSYCH
Risk Assessment    The following ratings are based on my review of records and interview(s) with Initial Evaluation  Risk of Harm to Self:   Demographic risk factors include , alaskan, or native [place] and male  Historical Risk Factors include: a relative or close friend who  by suicide, chronic psychiatric problems, substance abuse or dependence and history of impulsive behaviors  Recent Specific Risk Factors include: experienced fleeting ideation, made threats, sense of hopelessness/helplessness, unable to visualize a realistic positive future, substance abuse, feelings of guilt or self blame, recent losses: , worries about finances or work, chronic pain or health problems, recent rejection/lack of support and diagnosis of depression  These risk factors presented within the last few months  Risk of Harm to Others:   Demographic Risk Factors include: male  Recent Specific Risk Factors include: concomitant mood or thought disorder and multiple stressors  Access to Weapons: The patient has access to the following weapons: Levi Naranjo stated there is only one BB gun in house and he doesn't know where it is  "I have never been a gun person "    the following steps have been taken to ensure weapons are properly secured:   Based on the above information, the client presents the following risk of harm to self or others: low  Active Problems     1  Bulging lumbar disc (722 10) (M51 26)   2  Chronic tension-type headache (339 12) (G44 229)   3  Grief reaction (309 0) (F43 20)   4  Hyperlipidemia (272 4) (E78 5)   5  Hypertension (401 9) (I10)   6  Insomnia (780 52) (G47 00)   7  Lateral epicondylitis of right elbow (726 32) (M77 11)   8  Myofascial pain syndrome (729 1) (M79 1)   9  Need for diphtheria-tetanus-pertussis (Tdap) vaccine (V06 1) (Z23)    Adjustment disorder (309 9) (F43 20)       MDD (major depressive disorder) (296 20) (F32 9)          Past Medical History    1   History of Herniated nucleus pulposus, L5-S1, right (722 10) (M51 27)   2  History of actinic keratosis (V13 3) (Z87 2)   3  History of low back pain (V13 59) (Z87 39)    Future Appointments    Date/Time Provider Specialty Site   10/12/2017 10:00 AM ALEXX Kitchen  Psychiatry [facility] H[facility]PITAL[facility]   10/13/2017 10:00 AM ALEXX Kitchen   Psychiatry [facility]   11/06/2017 05:45 PM Ariella Dennis,  Family Medicine [facility]     Signatures   Electronically signed by : Hodan Merchant RN; Oct 11 2017 11:59AM EST                       (Author)

## 2018-01-14 NOTE — PSYCH
History of Present Illness  [facility] Clinical Progress [facility]:   Specialized Services Documentation - Therapist must complete separate progress note for each specific clinical activity in which the client participated during the day  [phone]) Group Psychotherapy: (10:45-11:45) Silvio Weber participated in psychotherapy group focused on improving relationships  Silvio Weber identified a chaotic night at home last night as a stressor today  He actively engaged in group discussion, talking about how he feels overwhelmed by all the people in his house and trying to assert his needs with them  He provided support to a peer struggling with familial relationships, and expressed that his parents did not tell him they were proud of him until he was 35  Peers discussed ways to make changes now to improve their relationships, and trying to not have expectations of others and what they are able to give back  Moderate progress noted toward goals  Continue psychotherapy group to encourage Silvio Weber to explore stressors and coping  Mary Price MSW, LSW     (922) Group Psychotherapy: (9:30-10:30am)   Silvio Weber participated in group  The focus of the group was on anger management  We reviewed two handouts one was on anger triggers and coping skills and the other one was on a comparison between healthy and unhealthy anger traits  Each group member identified an anger trigger of theirs and which was it a perceived need threat to  Also, which anger trait do they need to work on the most  He recognizes he has a great deal of anger  Provided supportive feedback to a fellow group member who takes other peoples anger actions personally  Min progress on goal  Continue with group  Treatment Plan Problem(s): 1 1  Gera Ramon, LCSW       (025) Education Therapy Goals set - Call surgeon    Treatment Plan Problem(s): 1 1, 1 2  Education Therapy Time - 0900 - 0930 Previous goal was met  Readiness to Learning:  He is receptive to learning  There are  no barriers to learning  Learning Assessment Time - 1330 - 1400   Education completed on  illness and wellness tools  The teaching method was  verbal and demonstration  Shared area of learning: Yes  CHAYA Tee MT-BC     (462) Allied Therapy [id number] Guidourbano Deborah actively shared in therapy group focused on decreasing self-stigmas and support  He attentively listened to [facility] share his life story  Group encouraged power of learning about self, accepting illness, and personal responsibility in recovery  Community resources reviewed  Guidourbano Cabrera determined a way to self-soothe as listening to show tunes  Some progress towards goal observed  Continue psychotherapy to encourage self-awareness and the healthy engagement of support  CHAYA Tee  Treatment Plan Problem(s): 1 1, 1 2  JOVANY Martinez       Case Management Note:   [id number] Met with Jj Cabrera  He reported that he was tired today but productive last evening  He has additional work paperwork  He did report using the DBT nightmare protocol and it was helpful and he did sleep throughout the night  He does report gaining from program  He also researched and his medication is covered by his and his partner's insurance  TREATMENT SESSION NUMBER: 6   Current suicide risk is low  Medications not changed/added/denied  JOVANY Martinez      Active Problems    1  Bulging lumbar disc (722 10) (M51 26)   2  Chronic tension-type headache (339 12) (G44 229)   3  Generalized anxiety disorder (300 02) (F41 1)   4  Grief reaction (309 0) (F43 20)   5  High risk medication use (V58 69) (Z79 899)   6  Hyperlipidemia (272 4) (E78 5)   7  Hypertension (401 9) (I10)   8  Insomnia (780 52) (G47 00)   9  Lateral epicondylitis of right elbow (726 32) (M77 11)   10  Major depressive disorder, recurrent, severe w/o psychotic behavior (296 33) (F33 2)   11  Myofascial pain syndrome (729 1) (M79 1)   12   Need for diphtheria-tetanus-pertussis (Tdap) vaccine (V06 1) (Z23)    Past Medical History    1  History of Herniated nucleus pulposus, L5-S1, right (722 10) (M51 27)   2  History of actinic keratosis (V13 3) (Z87 2)   3  History of low back pain (V13 59) (Z87 39)   4  Denied: History of seizure   5  Denied: History of traumatic injury of head    Allergies    1  No Known Drug Allergies    Current Meds   1  Abilify 5 MG Oral Tablet; Therapy: (Recorded:11Oct2017) to Recorded   2  ARIPiprazole 5 MG Oral Tablet; TAKE 1 TABLET DAILY; Therapy: 54SZH8410 to (Evaluate:10Nov2017)  Requested for: 19GFK5198; Last   Rx:11Oct2017 Ordered   3  BusPIRone HCl - 15 MG Oral Tablet; Take 1 tablet daily; Therapy: 59ZCA9378 to (Kunal Segal)  Requested for: 02Oct2017; Last   Rx:82Gbg5957 Ordered   4  Escitalopram Oxalate 10 MG Oral Tablet; Take 1 tablet daily; Therapy: 10QYE3399 to (Kunal Segal)  Requested for: 02Oct2017; Last   Rx:02Oct2017 Ordered   5  Fenofibrate 145 MG Oral Tablet; Take 1 tablet daily; Therapy: 56Tvy1533 to (Last Rx:65Cse4275)  Requested for: 47Xdn3455 Ordered   6  Lisinopril 20 MG Oral Tablet; TAKE 1 TABLET DAILY FOR BLOOD PRESSURE; Therapy: 96QTS3278 to (Evaluate:13Nov2017)  Requested for: 14TDO1476; Last   Rx:54Yrj4827 Ordered   7  TraMADol HCl - 50 MG Oral Tablet; Take 1 tablet 4 times daily; Therapy: 35HYB2559 to (Isaac Semen)  Requested for: 75FJG4387; Last   Rx:10Oct2017 Ordered   8  TraZODone HCl - 50 MG Oral Tablet; two tablets at bedtime  Requested for: 57AWO6418;   Last Rx:25Beg5639 Ordered   9  Zolpidem Tartrate 10 MG Oral Tablet; TAKE 1 TABLET AT BEDTIME; Therapy: 06PAK8477 to (Evaluate:13Xfg1740); Last Rx:10Oct2017 Ordered    Family Psych History  Mother    1  Family history of hypertension (V17 49) (Z82 49)  Sister    2  Family history of Bipolar 1 disorder  Brother    3  Family history of CHD (coronary heart disease)  Family History    4   Family history of Hypertension (V17 49)   5  Family history of Thyroid Cancer    Social History    · Being A Social Drinker   · Daily caffeine consumption   · Inadequate exercise (V69 0) (Z72 3)   ·    · Never A Smoker   · Working Full Time    Future Appointments    Date/Time Provider Specialty Site   10/19/2017 11:00 AM ALEXX Morales  Psychiatry [facility]   10/20/2017 11:00 AM ALEXX Morales   Psychiatry [facility]   11/06/2017 05:45 PM Martina Dennis,  Family Medicine [facility]     Signatures   Electronically signed by : MARTINEZ Matson; Oct 18 2017  1:03PM EST                       (Author)    Electronically signed by : CHAYA Skaggs; Oct 18 2017  3:34PM EST                       (Author)    Electronically signed by : JOVANY Gabriel; Oct 18 2017  3:42PM EST                       (Author)    Electronically signed by : Faina Murphy LCSW; Oct 19 2017  8:11PM EST                       (Author)

## 2018-01-14 NOTE — PSYCH
History of Present Illness  [facility] Clinical Progress [facility]:   Specialized Services Documentation - Therapist must complete separate progress note for each specific clinical activity in which the client participated during the day  [phone]) Group Psychotherapy: ([phone]) Rupa Burroughs actively participated in psychotherapy group focused on the effects of focusing on others' feelings more than one's own, as well as ways to be more assertive with needs  Rupa Burroughs identified his upcoming surgery as a stressor  Group discussed the importance of having passions/interests in one's life and the freedom to be able to engage in them without trying to please others  Rupa Burroughs noted that he cut out people that were not supportive of him out of his life, and stated that while it took him a long time to not care as much about others' opinions, he is now able to focus on his needs and boundaries and accept that some others might not be able to accept that  Good progress noted toward goals  Discharge at the end of program day today  Treatment Plan Problem(s): 1 1, 1 2, 1 4  Claudia Harley MSW, LSW     (358) Group Psychotherapy: [id number] Rupa Burroughs participated in wellness group focused on choosing supports for recovery including who patient wants to enlist in their journey to wellness, what they want to share with their support, and educating support on how to offer assistance individualized to patient  Rupa Burroughs was attentive and discussed completing relapse prevention plan and supports in place that he has set up  Discussed that he wanted to obtain list of early signs of relapse (handout) shared yesterday so he can share this with several of his supports  Rupa Burroughs made good progress toward goals  D/C today at end of Program day  Treatment Plan Problem(s): 1 1,1 2,1 4   Dewey Donaldson RN       (416) Education Therapy Goals set - PHQ9 reduced from an 18 on first day to a 7    Treatment Plan Problem(s): 1 0    Education Therapy Time - 0900 - 0930 Previous goal was not met  Readiness to Learning:  He is receptive to learning  There are  no barriers to learning  Learning Assessment Time - 1330 - 1400   Education completed on  illness, medication, aftercare and wellness tools  The teaching method was  verbal and written  Shared area of learning: Yes  JOVANY Navarro     (861) Allied Therapy 7037-8422 Dione Nickerson actively shared in [facility] group focused on assertiveness from [facility] Interpersonal Effectiveness  He engaged in role-play instrument improvisation task  Group explored assertiveness, passive, passive aggressive, and aggressive  Group was introduced to DEAR OLGA as a way to be assertive in getting what you want  Dione Nickerson shared how he has seen much improvement in his aggressiveness since he started the program, and encouraged his peers to practice this skill because it worked well for him  Good progress towards goal  Discharge at end of program day today  CHAYA Harding  Treatment Plan Problem(s): 1 2, 1 5  JOVANY Navarro       Case Management Note:   IOP Day #5 [phone] Dione Nickerson reviewed D/C instructions, medications, relapse prevention and review of support groups with this CM  He signed and received copies of all  He denied SI and HI and any SE from medications  He verbalized that he has profited from [employer] but is leaving somewhat nervous about upcoming back surgery 11/14  He feels he has learned and implemented strategies for better wellness including relaxation techniques, setting boundaries and learning to care for himself  D/C today at end of program day  TREATMENT SESSION NUMBER: 15   Current suicide risk is low  Medications not changed/added/denied  Shane Lambert RN   [facility] Follow-up Physician's Orders:   11/9/2017  8:50 AM    Discharge Today   MD Shane Ojeda, TORRI      Active Problems    1  Bulging lumbar disc (722 10) (M51 26)   2   Chronic tension-type headache (339 12) (G44 229)   3  Encounter for pre-operative cardiovascular clearance (V72 81) (Z01 810)   4  Generalized anxiety disorder (300 02) (F41 1)   5  Grief reaction (309 0) (F43 20)   6  High risk medication use (V58 69) (Z79 899)   7  Hyperlipidemia (272 4) (E78 5)   8  Hypertension (401 9) (I10)   9  Insomnia (780 52) (G47 00)   10  Lateral epicondylitis of right elbow (726 32) (M77 11)   11  Major depressive disorder, recurrent, severe w/o psychotic behavior (296 33) (F33 2)   12  Myofascial pain syndrome (729 1) (M79 1)    Past Medical History    1  History of Herniated nucleus pulposus, L5-S1, right (722 10) (M51 27)   2  History of actinic keratosis (V13 3) (Z87 2)   3  History of low back pain (V13 59) (Z87 39)   4  Denied: History of seizure   5  Denied: History of traumatic injury of head    Allergies    1  No Known Drug Allergies    Current Meds   1  ARIPiprazole 5 MG Oral Tablet; TAKE 1 TABLET DAILY; Therapy: 87TBR8603 to (Evaluate:10Nov2017)  Requested for: 49UTS3308; Last   Rx:11Oct2017; Status: ACTIVE - Renewal Denied Ordered   2  BusPIRone HCl - 15 MG Oral Tablet; Take 1 tablet daily; Therapy: 99IJB0846 to ([facility])  Requested for: 74PKQ8651; Last   Rx:06Nov2017 Ordered   3  Escitalopram Oxalate 10 MG Oral Tablet; Take 1 tablet daily; Therapy: 79YJE5410 to ([facility])  Requested for: 14BJG8938; Last   Rx:06Nov2017 Ordered   4  Fenofibrate 145 MG Oral Tablet; Take 1 tablet daily; Therapy: 18GUJ2805 to (Last VS:97DOC9371)  Requested for: 66NTC4453 Ordered   5  Lisinopril 20 MG Oral Tablet; TAKE 1 TABLET DAILY FOR BLOOD PRESSURE; Therapy: 76Epk0566 to ([facility])  Requested for: 74BRZ3374; Last   Rx:06Nov2017 Ordered   6  TraMADol HCl - 50 MG Oral Tablet; Take 1 tablet 4 times daily; Therapy: 89SWN9376 to (Evaluate:84Hpq3170)  Requested for: 84CVM5217; Last   Rx:06Nov2017 Ordered   7   TraZODone HCl - 50 MG Oral Tablet; two tablets at bedtime  Requested for: 38NMP4265;   Last PE:15ZWZ6375 Ordered   8  Zolpidem Tartrate 10 MG Oral Tablet; TAKE 1 TABLET AT BEDTIME; Therapy: 01HSV6621 to (Evaluate:25Ehl3193); Last Rx:45Vno8736 Ordered    Family Psych History  Mother    1  Family history of hypertension (V17 49) (Z82 49)  Sister    2  Family history of Bipolar 1 disorder  Brother    3  Family history of CHD (coronary heart disease)  Family History    4  Family history of Hypertension (V17 49)   5  Family history of Thyroid Cancer    Social History    · Being A Social Drinker   · Daily caffeine consumption   · Inadequate exercise (V69 0) (Z72 3)   ·    · Never A Smoker   · Working Full Time    Future Appointments    Date/Time Provider Specialty Site   11/09/2017 12:00 PM Josie Rodriges M D   Psychiatry [facility] PARTIAL HOSPITALIZATION   02/21/2018 10:00 AM Prakash Vallejo DO Psychiatry  [address] [facility]   01/08/2018 03:30 PM Paola Dennis DO Family Medicine [facility]     Signatures   Electronically signed by : ALEXX Martines ; Nov 9 2017  8:51AM EST                       (Author)    Electronically signed by : MARTINEZ Storey; Nov 9 2017  1:53PM EST                       (Author)    Electronically signed by : JOVANY Mina; Nov 9 2017  2:11PM EST                       (Author)    Electronically signed by : CHAYA Pinon; Nov 9 2017  2:23PM EST                       (Author)    Electronically signed by : Dawson Hodgkins, RN; Nov 9 2017  4:14PM EST                       (Author)    Electronically signed by : Dawson Hodgkins, RN; Nov 9 2017  4:20PM EST                       (Author)

## 2018-01-15 NOTE — PSYCH
History of Present Illness  [facility] Clinical Progress Note [facility]:   Specialized Services Documentation - Therapist must complete separate progress note for each specific clinical activity in which the client participated during the day  ( ) Other 1500 This CM called pt's insurance reviewer to notify that Amandeep Barnes has only used three of six IOP days originally authorized by Mima Victor  requesting that Amandeep Barnes can attend tom  with D/C on Thursday 10/9/17 which will only be the fifth IOP day  Waiting for return call  Cari Garcia RN     Case Management Note:   0800 Amandeep Barnes is excused today IOP day off  Will return to Program tomorrow  Medications not changed/added/denied  Cari Garcia RN      Active Problems    1  Bulging lumbar disc (722 10) (M51 26)   2  Chronic tension-type headache (339 12) (G44 229)   3  Generalized anxiety disorder (300 02) (F41 1)   4  Grief reaction (309 0) (F43 20)   5  High risk medication use (V58 69) (Z79 899)   6  Hyperlipidemia (272 4) (E78 5)   7  Hypertension (401 9) (I10)   8  Insomnia (780 52) (G47 00)   9  Lateral epicondylitis of right elbow (726 32) (M77 11)   10  Major depressive disorder, recurrent, severe w/o psychotic behavior (296 33) (F33 2)   11  Myofascial pain syndrome (729 1) (M79 1)   12  Need for diphtheria-tetanus-pertussis (Tdap) vaccine (V06 1) (Z23)    Past Medical History    1  History of Herniated nucleus pulposus, L5-S1, right (722 10) (M51 27)   2  History of actinic keratosis (V13 3) (Z87 2)   3  History of low back pain (V13 59) (Z87 39)   4  Denied: History of seizure   5  Denied: History of traumatic injury of head    Allergies    1  No Known Drug Allergies    Current Meds   1  Abilify 5 MG Oral Tablet (ARIPiprazole); Therapy: (Recorded:11Oct2017) to Recorded   2  ARIPiprazole 5 MG Oral Tablet; TAKE 1 TABLET DAILY; Therapy: 62ZLC5752 to (Evaluate:10Nov2017)  Requested for: 11ZPK8268; Last   Rx:11Oct2017 Ordered   3  BusPIRone HCl - 15 MG Oral Tablet; Take 1 tablet daily; Therapy: 75OSL8663 to (Duaine Caulk)  Requested for: 02Oct2017; Last   Rx:02Oct2017 Ordered   4  Escitalopram Oxalate 10 MG Oral Tablet; Take 1 tablet daily; Therapy: 62UNV0416 to (Duaine Caulk)  Requested for: 02Oct2017; Last   Rx:02Oct2017 Ordered   5  Fenofibrate 145 MG Oral Tablet; Take 1 tablet daily; Therapy: 36Bkp6829 to (Last Rx:88Xhj8827)  Requested for: 32Rml2131 Ordered   6  Lisinopril 20 MG Oral Tablet; TAKE 1 TABLET DAILY FOR BLOOD PRESSURE; Therapy: 61ZCI9340 to (Evaluate:13Nov2017)  Requested for: 74EIA0853; Last   Rx:86Lfg4054 Ordered   7  TraMADol HCl - 50 MG Oral Tablet; Take 1 tablet 4 times daily; Therapy: 99NIQ5579 to (Aliya Leon)  Requested for: 06TNB8667; Last   Rx:10Oct2017 Ordered   8  TraZODone HCl - 50 MG Oral Tablet; two tablets at bedtime  Requested for: 29RXO9504;   Last Rx:50Dvb2617 Ordered   9  Zolpidem Tartrate 10 MG Oral Tablet; TAKE 1 TABLET AT BEDTIME; Therapy: 08COK5706 to (Evaluate:51Iyf3313); Last Rx:10Oct2017 Ordered    Family Psych History  Mother    1  Family history of hypertension (V17 49) (Z82 49)  Sister    2  Family history of Bipolar 1 disorder  Brother    3  Family history of CHD (coronary heart disease)  Family History    4  Family history of Hypertension (V17 49)   5  Family history of Thyroid Cancer    Social History    · Being A Social Drinker   · Daily caffeine consumption   · Inadequate exercise (V69 0) (Z72 3)   ·    · Never A Smoker   · Working Full Time    Future Appointments    Date/Time Provider Specialty Site   11/07/2017 12:15 PM Valentin Rodriges M D   Psychiatry [facility] HOSPITALIZATION   02/21/2018 10:00 AM Bryan Farias DO Psychiatry [facility]   11/06/2017 05:45 PM Michelle Dennis DO Family Medicine [facility]   11/10/2017 11:00 AM Michelle Dennis,  Family Medicine [facility] [place] [facility]     Signatures   Electronically signed by : Boy Aceves RN; Nov 6 2017  1:31PM EST                       (Author)    Electronically signed by : Boy Aceves RN; Nov 6 2017  2:57PM EST                       (Author)

## 2018-01-16 NOTE — PSYCH
History of Present Illness  [facility]:   Specialized Services Documentation - Therapist must complete separate progress note for each specific clinical activity in which the client participated during the day  Case Management Note:   0800 Meg Arriaza excused today IOP day off  Taylor Thompson RN      Active Problems    1  Bulging lumbar disc (722 10) (M51 26)   2  Chronic tension-type headache (339 12) (G44 229)   3  Encounter for pre-operative cardiovascular clearance (V72 81) (Z01 810)   4  Generalized anxiety disorder (300 02) (F41 1)   5  Grief reaction (309 0) (F43 20)   6  High risk medication use (V58 69) (Z79 899)   7  Hyperlipidemia (272 4) (E78 5)   8  Hypertension (401 9) (I10)   9  Insomnia (780 52) (G47 00)   10  Lateral epicondylitis of right elbow (726 32) (M77 11)   11  Major depressive disorder, recurrent, severe w/o psychotic behavior (296 33) (F33 2)   12  Myofascial pain syndrome (729 1) (M79 1)    Past Medical History    1  History of Herniated nucleus pulposus, L5-S1, right (722 10) (M51 27)   2  History of actinic keratosis (V13 3) (Z87 2)   3  History of low back pain (V13 59) (Z87 39)   4  Denied: History of seizure   5  Denied: History of traumatic injury of head    Allergies    1  No Known Drug Allergies    Current Meds   1  ARIPiprazole 5 MG Oral Tablet; TAKE 1 TABLET DAILY; Therapy: 73ANU0174 to (Evaluate:10Nov2017)  Requested for: 59MXC2745; Last   Rx:11Oct2017; Status: ACTIVE - Renewal Denied Ordered   2  BusPIRone HCl - 15 MG Oral Tablet; Take 1 tablet daily; Therapy: 48FRE6372 to (Jennifer Marcelino)  Requested for: 92UXG1245; Last   Rx:06Nov2017 Ordered   3  Escitalopram Oxalate 10 MG Oral Tablet; Take 1 tablet daily; Therapy: 90UXU5608 to (Jennifer Marcelino)  Requested for: 74NON4343; Last   Rx:06Nov2017 Ordered   4  Fenofibrate 145 MG Oral Tablet; Take 1 tablet daily;    Therapy: 67TTH2156 to (Last RD:55WHR3579)  Requested for: 86JHT0275 Ordered   5  Lisinopril 20 MG Oral Tablet; TAKE 1 TABLET DAILY FOR BLOOD PRESSURE; Therapy: 05Oyo7583 to (Kaley Rockwell)  Requested for: 10VGN5777; Last   Rx:06Nov2017 Ordered   6  TraMADol HCl - 50 MG Oral Tablet; Take 1 tablet 4 times daily; Therapy: 69WYC1262 to (Evaluate:13Vxf6952)  Requested for: 77CFM3123; Last   Rx:06Nov2017 Ordered   7  TraZODone HCl - 50 MG Oral Tablet; two tablets at bedtime  Requested for: 61AUG6926;   Last Rx:06Nov2017 Ordered   8  Zolpidem Tartrate 10 MG Oral Tablet; TAKE 1 TABLET AT BEDTIME; Therapy: 11BOP6089 to (Evaluate:39Vec9909); Last Rx:10Oct2017 Ordered    Family Psych History  Mother    1  Family history of hypertension (V17 49) (Z82 49)  Sister    2  Family history of Bipolar 1 disorder  Brother    3  Family history of CHD (coronary heart disease)  Family History    4  Family history of Hypertension (V17 49)   5   Family history of Thyroid Cancer    Social History    · Being A Social Drinker   · Daily caffeine consumption   · Inadequate exercise (V69 0) (Z72 3)   ·    · Never A Smoker   · Working Full Time    Future Appointments    Date/Time Provider Specialty Site   02/21/2018 10:00 AM Mairo Monsivais DO Psychiatry [facility]   01/08/2018 03:30 PM Juan Dennis DO Family Medicine [facility]     Signatures   Electronically signed by : Crai Garcia RN; Nov 8 2017  8:27AM EST                       (Author)

## 2018-01-16 NOTE — MISCELLANEOUS
Date: 03/21/2017   Dear Matt Whitney:     We have attempted to reach you regarding your upcoming appointment on 05/15/17 and with  [facility]     Unfortunately, due to a change in the provider's schedule we need to change your appointment  Please call our office as soon as possible so we can reschedule your appointment  We apologize for any inconvenience this may cause  Thank you in advance for your cooperation and assistance       Sincerely,   CALLED 03/16 17 AND 03/17/16 MAILBOX FULL TO LEAVE MESSAGE      Signatures   Electronically signed by : ALEXX Byrne ; Mar 21 2017  2:51PM EST                       (Author)

## 2018-01-16 NOTE — PSYCH
History of Present Illness  [facility]:   Specialized Services Documentation - Therapist must complete separate progress note for each specific clinical activity in which the client participated during the day  [phone]) Group Psychotherapy: (10:45-11:45) Naaman Paget participated in psychotherapy group focused on dealing with people who have different moral standards than oneself  Naaman Paget did not identify a stressor today, and did not otherwise participate in group discussion (typical for first treatment day)  No outward progress noted toward goals  continue psychotherapy group to encourage Naaman Paget to explore stressors and coping  Treatment Plan Problem(s): 1 1, 1 2  Idaville Ivanhoe MSW, LSW     (708) Group Psychotherapy: (0597-8056) Naaman Paget participated in wellness group focused on learning how the fight or flight mechanism works to protect individuals and also how it can be triggered by anxious thoughts, changes and other âfalse alarms  Clinton Canchola shared that he has experienced panic attacks in discussion with peers regarding their own experiences with high anxiety states and panic and how they have coped with same  Naaman Paget made moderate beginning progress toward goals  Continue group to provide education on cognitive and behavioral strategies that can be learned and practiced to break the cycle of fear and dysfunctional coping mechanisms that reinforce the cycle of fear and anxiety  Treatment Plan Problem(s): 1 1,1 2  Oswald Veliz RN       (171) Education Therapy Goals set - not avoid going home    Treatment Plan Problem(s): 1 2  Education Therapy Time - 0900 - 0930, Time first treatment day   Readiness to Learning:  He is receptive to learning  There are  no barriers to learning  Learning Assessment Time - 1330 - 1400   Education completed on  illness, medication and wellness tools  The teaching method was  verbal  Shared area of learning: Yes   Marilyn Presley, MT-BC (365) [facility] [phone] Luis Montejo excused from AT group due to case management evaluation  JOVANY Fernandez     ( ) Other [facility] contacted for pre-cert and this CM spoke with Mesfinthompson Mak today who gave pending reference #[id number]  Call back with authorization number is within 48 hours  Danny Whitten RN     Case Management Note:   [id number] Luis Montejo met with this CM to complete initial evaluation and to sign ROIs  He was given a crisis card that was explained along with [facility] information  Luis Montejo stated that he will bring paperwork to be completed for FMLA as he has been sent, by his employer [employer], due to a recent "breakdown" he had at work when his supervisor met with him and an  to discuss his declining performance  Luis Montejo stated that he attended one outpatient session yesterday, as mandated by his [employer] for assessment, at [facility] with OP therapist Steven  He stated that he wants to learn "better coping strategies" and feels [facility] will be helpful for him  He will return to his PCP for medication monitoring after D/C and, at this time, is not sure whether he will want an OP therapist long-term  Luis Montejo stated that Program Psychiatrist added Abilify 5 mg PO tablet daily and he does take his medications as ordered as he does not "want to feel this depressed any longer " He denied SI and HI as well as any manic or psychotic symptoms  TREATMENT SESSION NUMBER: 1   Current suicide risk is low  Medications changed/added/denied: See Above Danny Whitten RN      Active Problems     1  Bulging lumbar disc (722 10) (M51 26)   2  Chronic tension-type headache (339 12) (G44 229)   3  Grief reaction (309 0) (F43 20)   4  Hyperlipidemia (272 4) (E78 5)   5  Hypertension (401 9) (I10)   6  Insomnia (780 52) (G47 00)   7  Lateral epicondylitis of right elbow (726 32) (M77 11)   8  Myofascial pain syndrome (729 1) (M79 1)   9   Need for diphtheria-tetanus-pertussis (Tdap) vaccine (V06 1) (Z23)    Adjustment disorder (309 9) (F43 20)       MDD (major depressive disorder) (296 20) (F32 9)          Past Medical History    1  History of Herniated nucleus pulposus, L5-S1, right (722 10) (M51 27)   2  History of actinic keratosis (V13 3) (Z87 2)   3  History of low back pain (V13 59) (Z87 39)    Allergies    1  No Known Drug Allergies    Current Meds   1  ALPRAZolam 0 5 MG Oral Tablet; take 1-2 tabs as needed  twice daily; Therapy: 75Xav2314 to (Last Rx:36Gdj4657) Ordered   2  BusPIRone HCl - 15 MG Oral Tablet; Take 1 tablet daily; Therapy: 84MQZ2621 to ([facility])  Requested for: 10RKT6862; Last   Rx:68Drh9043 Ordered   3  Escitalopram Oxalate 10 MG Oral Tablet; Take 1 tablet daily; Therapy: 87DEF3597 to ([facility])  Requested for: 02Oct2017; Last   Rx:69Gxx5961 Ordered   4  Fenofibrate 145 MG Oral Tablet; Take 1 tablet daily; Therapy: 73Szt9914 to (Last Rx:25Dhi7843)  Requested for: 94Dut6972 Ordered   5  Lisinopril 20 MG Oral Tablet; TAKE 1 TABLET DAILY FOR BLOOD PRESSURE; Therapy: 95DSU6762 to (Evaluate:13Nov2017)  Requested for: 14TME3725; Last   Rx:57Iyd8917 Ordered   6  TraMADol HCl - 50 MG Oral Tablet; Take 1 tablet 4 times daily; Therapy: 30BAD7149 to (Eulas Regulus)  Requested for: 44HJY1905; Last   Rx:95Vtk1062 Ordered   7  TraZODone HCl - 50 MG Oral Tablet; two tablets at bedtime  Requested for: 97FWY0987;   Last Rx:48Fwb6821 Ordered   8  Zolpidem Tartrate 10 MG Oral Tablet; TAKE 1 TABLET AT BEDTIME; Therapy: 70TSM3820 to (Evaluate:10Arh2309); Last Rx:62Cgw5764 Ordered    Family Psych History  Mother    1  Family history of hypertension (V17 49) (Z82 49)  Brother    2  Family history of CHD (coronary heart disease)  Family History    3  Family history of Hypertension (V17 49)   4   Family history of Thyroid Cancer    Social History     · Being A Social Drinker   · Inadequate exercise (V69 0) (Z72 3)   · Never A Smoker   · Working Full Time    Marital History - Single          Future Appointments    Date/Time Provider Specialty Site   10/12/2017 10:00 AM ALEXX Harrington  Psychiatry [facility]   10/13/2017 10:00 AM ALEXX Harrington   Psychiatry [facility]   11/06/2017 05:45 PM Priti Dennis,  Family Medicine [facility] [place]     Signatures   Electronically signed by : MARTINEZ Kirkpatrick; Oct 11 2017 11:53AM EST                       (Author)    Electronically signed by : MARTINEZ Kirkpatrick; Oct 11 2017  1:52PM EST                       (Author)    Electronically signed by : JOVANY Bay; Oct 11 2017  2:26PM EST                       (Author)    Electronically signed by : Mikaela Low RN; Oct 11 2017  3:07PM EST                       (Author)

## 2018-01-16 NOTE — PSYCH
History of Present Illness  [facility]:   Specialized Services Documentation - Therapist must complete separate progress note for each specific clinical activity in which the client participated during the day  ([phone]) Group Psychotherapy: ([phone]) Kristin Crespo attended psychotherapy group focused on motivation and reduction of anxiety  Kristin Crespo identified his finances as a stressor  He actively engaged in group discussion, talking about feeling like he is constantly on edge and unable to tolerate even mild noise  He also noted that he lacks motivation to do things that need to be done at home sometimes  Group discussed ways to improve motivation and productivity, as well as ways to manage anxiety by incorporating regular mindfulness/relaxation exercises  Good progress noted toward goals  Continue psychotherapy group to encourage Kristin Crespo to explore life stressors and healthier ways of coping  Treatment Plan Problem(s): 1 1, 1 2  Mehrdad Vasquez MSW, LSW     (390) Group Psychotherapy: [id number] Kristin Crespo actively shared in psychotherapy group focused on decreasing self- stigma and supports  He attentively listened to [facility] share his life story  Group encouraged power of learning about self, accepting illness and personal responsibility in recovery  Community resources reviewed and Kristin Crespo asked about resources in his area  He identified struggle with negative self-talk and ways to be more kind to himself  Some progress toward goal noted  Continue psychotherapy to encourage self -awareness and healthy engagement of supports  Treatment Plan Problem(s): 1 5, 1 6  Allison Rush, [facility]       (556) Education Therapy Goals set - use wise mind techniques    Treatment Plan Problem(s): 1 5  Education Therapy Time - 0900 - 0930 Previous goal was met  Readiness to Learning:  He is receptive to learning  There are  no barriers to learning    Learning Assessment Time - 1330 - 1400   Education completed on  illness and wellness tools  The teaching method was  verbal  Shared area of learning: Yes  JOVANY Koroma     (653) Allied Therapy [phone] Sherlyn Solano actively shared in [facility] group focused on [address]  He engaged in therapist-led relaxation exercises, bert analysis, and loving kindness mantra  Group explored diaphragmatic breathing, progressive muscle relaxation, and willingness versus willfulness  Group was introduced to [facility] exercise, half-smiling, and willing hands  Sherlyn Solano shared [facility] I be healthyâ as a loving kindness mantra for himself  Sherlyn Solano identified being able to engage in relaxation exercises more easily and gaining more from them  Good progress towards goal observed and shared  Continue AT to further encourage skill use of turning the mind to promote recovery  CHAYA Preciado  Treatment Plan Problem(s): 1 1, 1 6  JOVANY Koroma       Case Management Note:   [facility]#3 [id number] Sherlyn Solano met with this CM to review progress in Program and D/C planning  He stated that he has been trying to contact OP providers on his insurance list to set up OP psychiatrist and OP therapist appointments but has not been able to make an appointment as yet  He stated that he is going to see his PCP and discuss with him whether he will be willing to see him and continue medication ordering and management until he is seen by Dr Rosalinda Chatman in February 2018  He stated that he has left several messages for OP psychiatrists but no one has returned calls to date  He denied suicidal or homicidal ideation as well as any SE from medications  Sherlyn Solano stated that he continues to struggle with sleep disturbances specifically waking up after sleeping a few hours   He is practicing several healthy sleep hygiene habits that help "sometimes" but not "regularly " Sherlyn Solano discussed success with setting limits with relatives in his home and that he has noticed that others are cooperating with these limits  He verbalized appreciation for support from his mother who went with him to [place] yesterday to set up surgery (back) schedule and to spend time together, have lunch, etc  Lajean Cassette He also is receiving support from partner Alysa Dumont who is helping out with additional responsibilities that Rupa Burroughs previously shouldered  TREATMENT SESSION NUMBER: 13     Medications not changed/added/denied  Dewey Donaldson RN      Active Problems    1  Bulging lumbar disc (722 10) (M51 26)   2  Chronic tension-type headache (339 12) (G44 229)   3  Generalized anxiety disorder (300 02) (F41 1)   4  Grief reaction (309 0) (F43 20)   5  High risk medication use (V58 69) (Z79 899)   6  Hyperlipidemia (272 4) (E78 5)   7  Hypertension (401 9) (I10)   8  Insomnia (780 52) (G47 00)   9  Lateral epicondylitis of right elbow (726 32) (M77 11)   10  Major depressive disorder, recurrent, severe w/o psychotic behavior (296 33) (F33 2)   11  Myofascial pain syndrome (729 1) (M79 1)   12  Need for diphtheria-tetanus-pertussis (Tdap) vaccine (V06 1) (Z23)    Past Medical History    1  History of Herniated nucleus pulposus, L5-S1, right (722 10) (M51 27)   2  History of actinic keratosis (V13 3) (Z87 2)   3  History of low back pain (V13 59) (Z87 39)   4  Denied: History of seizure   5  Denied: History of traumatic injury of head    Allergies    1  No Known Drug Allergies    Current Meds   1  Abilify 5 MG Oral Tablet; Therapy: (Recorded:11Oct2017) to Recorded   2  ARIPiprazole 5 MG Oral Tablet; TAKE 1 TABLET DAILY; Therapy: 58KHM4551 to (Evaluate:10Nov2017)  Requested for: 65ZLY9174; Last   Rx:11Oct2017 Ordered   3  BusPIRone HCl - 15 MG Oral Tablet; Take 1 tablet daily; Therapy: 27KMC4102 to (Katharina Crank)  Requested for: 02Oct2017; Last   Rx:02Oct2017 Ordered   4  Escitalopram Oxalate 10 MG Oral Tablet; Take 1 tablet daily;    Therapy: 81NCP2935 to (Katharina Crank)  Requested for: 16ECQ5685; Last   [phone] Ordered   5  Fenofibrate 145 MG Oral Tablet; Take 1 tablet daily; Therapy: 28Mol4554 to (Last Rx:86Goc0120)  Requested for: 05Yfm8106 Ordered   6  Lisinopril 20 MG Oral Tablet; TAKE 1 TABLET DAILY FOR BLOOD PRESSURE; Therapy: 75JZU8594 to (Evaluate:13Nov2017)  Requested for: 94OLK2603; Last   Rx:95Jcl6840 Ordered   7  TraMADol HCl - 50 MG Oral Tablet; Take 1 tablet 4 times daily; [facility]684 [facility]  Requested for: 54BHZ9042; Last   Rx:10Oct2017 Ordered   8  TraZODone HCl - 50 MG Oral Tablet; two tablets at bedtime  Requested for: 10KMM9447;   Last Rx:15Zya6570 Ordered   9  Zolpidem Tartrate 10 MG Oral Tablet; TAKE 1 TABLET AT BEDTIME; Therapy: 25CAP7384 to (Evaluate:34Wdl9926); Last Rx:10Oct2017 Ordered    Family Psych History  Mother    1  Family history of hypertension (V17 49) (Z82 49)  Sister    2  Family history of Bipolar 1 disorder  Brother    3  Family history of CHD (coronary heart disease)  Family History    4  Family history of Hypertension (V17 49)   5  Family history of Thyroid Cancer    Social History    · Being A Social Drinker   · Daily caffeine consumption   · Inadequate exercise (V69 0) (Z72 3)   ·    · Never A Smoker   · Working Full Time    Future Appointments    Date/Time Provider Specialty Site   11/03/2017 12:00 PM Tessa Rodriges M D   Psychiatry [facility]   02/21/2018 10:00 AM Ellyn Villatoro DO Psychiatry [facility]   11/06/2017 05:45 PM Geo Dennis DO [facility]   11/10/2017 11:00 AM Geo Dennis DO [facility]     Signatures   Electronically signed by : MARTINEZ Schneider; Nov 1 2017 11:08AM EST                       (Author)    Electronically signed by : CHAYA Moseley; Nov 1 2017  2:56PM EST                       (Author)    Electronically signed by : Vincenzo Baig RN; Nov 1 2017  3:17PM EST                       (Author)    Electronically signed by : JOVANY Chen; Nov 1 2017  3:28PM EST                       (Author)

## 2018-01-16 NOTE — MISCELLANEOUS
Message  Return to work or school:   Elizabeth Diez is under my professional care  He was seen in my office on 10/05/2017   He is able to return to work on  10/16/2017      PLEASE EXCUSE Austin Chiu FROM 10/05/2017 UNTIL 10/16/2017          Signatures   Electronically signed by : Gaurav Richter DO; Oct  5 2017 12:47PM EST                       (Author)

## 2018-01-16 NOTE — PSYCH
History of Present Illness  [facility] Clinical Progress [facility]:   Specialized Services Documentation - Therapist must complete separate progress note for each specific clinical activity in which the client participated during the day  ([phone]) Group Psychotherapy: 4903-5594  Kayce Navarrete participated in psychotherapy group that focused on reincorporating activities that were once enjoyable  Kayce Navarrete identified finances and work as stressors for today  Kayce Navarrete actively participated in group, attentive and supportive to peers  He discussed difficulty staying focused on enjoyable activities because his mind easily wanders off  Mild progress towards goals  Continue psychotherapy group to encourage Kayce Navarrete to explore stressors and coping  MARIA DE JESUS Fleming Intern  Treatment Plan Problem(s): 1 1, 1 2  Matthew Agrawal MSW, LSW     (300) Group Psychotherapy: (9:30-10:30am)   Kayce Navarrete participated in group  The group topics were 15 styles of distorted thinking and 12 rules to ruin your day  The group reviewed the handouts then discussed which of the distorted styles of thinking do they have, where does having distorted thinking lead to and coping skills  Kayce Navarrete appears to have a great deal of negative thinking that he appears to believe that are ingrained due to society  Min progress on goal  Continue with group  Treatment Plan Problem(s): 1 1  Danny Roman LCSW       (344) Education Therapy Goals set - Go to store    Treatment Plan Problem(s): 1 1, 1 2  Education Therapy Time - 0900 - 0930 Previous goal was met  Readiness to Learning:  He is receptive to learning  There are  no barriers to learning  Learning Assessment Time - 1330 - 1400   Education completed on  illness and wellness tools  The teaching method was  verbal and demonstration  Shared area of learning: Yes  CHAYA Li MT-BC     (582) Allied Therapy 2211-3911 Kayce Navarrete actively shared in [facility] group focused on DBT skill [place]  He engaged in task of observing, describing, and participating in instrument playing, and wise mind exercises  Group explored differences between emotional mind, reasonable mind, and wise mind  Group also explored how to nonjudgmentally, one-mindfully, and effectively engage in wise mind  He was given hand-outs on topic, including DBT skill diary card for the week  Obdulio Arango shared that he gets stuck in reasonable mind at work when he knows he should be showing empathy  Becoming engaged versus getting emotionally invested was discussed  Some good progress towards goal shared  Continue AT to further explore the use of wise mind to be mindful  CHAYA Reynoso  Treatment Plan Problem(s): 1 1, 1 2  Ileana Loera [facility]       Case Management Note:   2529-3610 Met with Obdulio Arango  Chief complaint is poor sleep with ruminative thoughts leading to vivid nightmares  He shared that he is not sleeping due to nightmares about work  The nightmares are distressing to him  He is able to voice that he has not yet received the [facility] paperwork for this office to complete and this is upsetting to him  He was able to problem solve  He also spoke of some of the issues at home  Skills reviewed and he was given the DBT sleep hygiene and nightmare protocol to work on  Obdulio Arango appears to struggle with personal judgements related to his need for help which is a barrier to reducing his racing thoughts  TREATMENT SESSION NUMBER: 4   Current suicide risk is low  Medications not changed/added/denied  JOVANY Escobar      Active Problems    1  Bulging lumbar disc (722 10) (M51 26)   2  Chronic tension-type headache (339 12) (G44 229)   3  Generalized anxiety disorder (300 02) (F41 1)   4  Grief reaction (309 0) (F43 20)   5  High risk medication use (V58 69) (Z79 899)   6  Hyperlipidemia (272 4) (E78 5)   7  Hypertension (401 9) (I10)   8  Insomnia (780 52) (G47 00)   9   Lateral epicondylitis of right elbow (726 32) (M77 11)   10  Major depressive disorder, recurrent, severe w/o psychotic behavior (296 33) (F33 2)   11  Myofascial pain syndrome (729 1) (M79 1)   12  Need for diphtheria-tetanus-pertussis (Tdap) vaccine (V06 1) (Z23)    Past Medical History    1  History of Herniated nucleus pulposus, L5-S1, right (722 10) (M51 27)   2  History of actinic keratosis (V13 3) (Z87 2)   3  History of low back pain (V13 59) (Z87 39)   4  Denied: History of seizure   5  Denied: History of traumatic injury of head    Allergies    1  No Known Drug Allergies    Current Meds   1  Abilify 5 MG Oral Tablet; Therapy: (Recorded:11Oct2017) to Recorded   2  ARIPiprazole 5 MG Oral Tablet; TAKE 1 TABLET DAILY; Therapy: 45VKQ8624 to (Evaluate:10Nov2017)  Requested for: 81IFK8776; Last   Rx:11Oct2017 Ordered   3  BusPIRone HCl - 15 MG Oral Tablet; Take 1 tablet daily; Therapy: 82PNG5046 to (Donal Fabry)  Requested for: 02Oct2017; Last   Rx:45Btp1042 Ordered   4  Escitalopram Oxalate 10 MG Oral Tablet; Take 1 tablet daily; Therapy: 82DGJ3004 to (Donal Fabry)  Requested for: 02Oct2017; Last   Rx:02Oct2017 Ordered   5  Fenofibrate 145 MG Oral Tablet; Take 1 tablet daily; Therapy: 75Imf0861 to (Last Rx:14Yjg6193)  Requested for: 55Cim4870 Ordered   6  Lisinopril 20 MG Oral Tablet; TAKE 1 TABLET DAILY FOR BLOOD PRESSURE; Therapy: 39MDQ6518 to (Evaluate:13Nov2017)  Requested for: 75IAY5193; Last   Rx:45Gcp1978 Ordered   7  TraMADol HCl - 50 MG Oral Tablet; Take 1 tablet 4 times daily; Therapy: 40PQP7053 to (Shi Buenrostro)  Requested for: 41NBD1917; Last   Rx:10Oct2017 Ordered   8  TraZODone HCl - 50 MG Oral Tablet; two tablets at bedtime  Requested for: 77FZI9457;   Last Rx:64Sgi5527 Ordered   9  Zolpidem Tartrate 10 MG Oral Tablet; TAKE 1 TABLET AT BEDTIME; Therapy: 38NKM5947 to (Evaluate:59Tel9081); Last Rx:10Oct2017 Ordered    Family Psych History  Mother    1   Family history of hypertension (V17 49) (Z82 49)  Sister 2  Family history of Bipolar 1 disorder  Brother    3  Family history of CHD (coronary heart disease)  Family History    4  Family history of Hypertension (V17 49)   5  Family history of Thyroid Cancer    Social History    · Being A Social Drinker   · Daily caffeine consumption   · Inadequate exercise (V69 0) (Z72 3)   ·    · Never A Smoker   · Working Full Time    Future Appointments    Date/Time Provider Specialty Site   10/17/2017 11:15 AM ALEXX Mcmanus  Psychiatry [facility]   10/18/2017 11:15 AM ALEXX Mcmanus  [facility]iatry [facility]   10/19/2017 11:00 AM ALEXX Mcmanus  [facility]iatry [facility]   10/20/2017 11:00 AM ALEXX Mcmanus   [facility]iatry [facility]   11/06/2017 05:45 PM Sybil Dennis DO Family Medicine [facility]     Signatures   Electronically signed by : CHAYA Santos; Oct 16 2017  2:20PM EST                       (Author)    Electronically signed by : MARTINEZ Arnold; Oct 16 2017  2:33PM EST                       (Author)    Electronically signed by : JOVANY Miguel; Oct 16 2017  2:44PM EST                       (Author)    Electronically signed by : Angelika Riggs LCSW; Oct 16 2017  3:08PM EST                       (Author)    Electronically signed by : MARTINEZ De Leon; Oct 16 2017  3:38PM EST                       (Author)

## 2018-01-18 NOTE — PSYCH
History of Present Illness  [facility]:   Specialized Services Documentation - Therapist must complete separate progress note for each specific clinical activity in which the client participated during the day  [phone]) Group Psychotherapy: (10:45-11:45) Kayce Navarrete participated in psychotherapy group focused on expressing difficult emotions and ways of coping with overwhelming emotions  Kayce Navarrete identified his back being in pain as a stressor today  He was otherwise quiet throughout group discussion, although he did appear attentive to peers through good eye contact and nodding his head in agreement with certain talking points  No outward progress noted toward goals  Continue psychotherapy group to encourage Kayce Navarrete to explore stressors and coping  Treatment Plan Problem(s): 1 1, 1 2, 1 4  Matthew Agrawal MSW, LSW     (196) Group Psychotherapy: (8378-9104)Joshua attended the morning psychotherapy group  He discussed his depression, his anxiety,the fact he recently lost his dad and mentor,the fact his sister and her kids had to move in with them at his moms and his work stress  he was attentive and participated in the group discussion on mindfulness strategies and how they could be relevant and helpful to his situation  [facility]  Treatment Plan Problem(s): 1 1,1 2        (942) Education Therapy Goals set - Finish disability paperwork    Treatment Plan Problem(s): 1 1  Education Therapy Time - 0900 - 0930 Previous goal was met  Readiness to Learning:  He is receptive to learning  There are  no barriers to learning  Learning Assessment Time - 1330 - 1400   Education completed on  illness and wellness tools  The teaching method was  verbal and demonstration  Shared area of learning: Yes  CHAYA Li MT-BC     (158) Allied Therapy 8519-5218 Kayce Navarrete actively shared in [facility] group focused [address] and communication challenges  He engaged in tasks exploring what makes it difficult to share and strategies to improve with supports  He appeared attentive and engaged appropriately sharing that he does struggle with âfinding the right wordsâ and can be easily âdistracted off taskâ  Identifying objectives and the [facility] process reviewed  Some progress toward goal noted  Continue AT to encourage sharing, personal advocacy and practice of DBT skills  Treatment Plan Problem(s): 1 2, 1 4  Aiden Butts [facility]       Case Management Note:   9451-0218 Met with Particia Niece  He reported feeling easily overwhelmed and feels he loses time "mental fatigue"  Discussed distractions  He shared more about work issues  FMLA paperwork completed and faxed  He continues to put effort into program and finding it helpful  Affect remains depressed  TREATMENT SESSION NUMBER: 7   Current suicide risk is low  Medications not changed/added/denied  Aiden Butts [facility]      Active Problems    1  Bulging lumbar disc (722 10) (M51 26)   2  Chronic tension-type headache (339 12) (G44 229)   3  Generalized anxiety disorder (300 02) (F41 1)   4  Grief reaction (309 0) (F43 20)   5  High risk medication use (V58 69) (Z79 899)   6  Hyperlipidemia (272 4) (E78 5)   7  Hypertension (401 9) (I10)   8  Insomnia (780 52) (G47 00)   9  Lateral epicondylitis of right elbow (726 32) (M77 11)   10  Major depressive disorder, recurrent, severe w/o psychotic behavior (296 33) (F33 2)   11  Myofascial pain syndrome (729 1) (M79 1)   12  Need for diphtheria-tetanus-pertussis (Tdap) vaccine (V06 1) (Z23)    Past Medical History    1  History of Herniated nucleus pulposus, L5-S1, right (722 10) (M51 27)   2  History of actinic keratosis (V13 3) (Z87 2)   3  History of low back pain (V13 59) (Z87 39)   4  Denied: History of seizure   5  Denied: History of traumatic injury of head    Allergies    1  No Known Drug Allergies    Current Meds   1  Abilify 5 MG Oral Tablet;    Therapy: (Recorded:11Oct2017) to Recorded   2  ARIPiprazole 5 MG Oral Tablet; TAKE 1 TABLET DAILY; Therapy: 01WJF7976 to (Evaluate:10Nov2017)  Requested for: 07XWF4272; Last   Rx:11Oct2017 Ordered   3  BusPIRone HCl - 15 MG Oral Tablet; Take 1 tablet daily; Therapy: 01EMS4915 to (Lailanitishe Faster)  Requested for: 02Oct2017; Last   Rx:02Oct2017 Ordered   4  Escitalopram Oxalate 10 MG Oral Tablet; Take 1 tablet daily; Therapy: 19JIF5976 to (Jewelene Faster)  Requested for: 02Oct2017; Last   Rx:02Oct2017 Ordered   5  Fenofibrate 145 MG Oral Tablet; Take 1 tablet daily; Therapy: 29Fyu6236 to (Last Rx:95Kky6662)  Requested for: 14Fml1486 Ordered   6  Lisinopril 20 MG Oral Tablet; TAKE 1 TABLET DAILY FOR BLOOD PRESSURE; Therapy: 51EWJ3586 to (Evaluate:13Nov2017)  Requested for: 16SIQ7181; Last   Rx:44Gbm1800 Ordered   7  TraMADol HCl - 50 MG Oral Tablet; Take 1 tablet 4 times daily; Therapy: 89EJC7800 to ([facility])  Requested for: 46XUR2481; Last   Rx:10Oct2017 Ordered   8  TraZODone HCl - 50 MG Oral Tablet; two tablets at bedtime  Requested for: 88ZNV9273;   Last Rx:82Hii3219 Ordered   9  Zolpidem Tartrate 10 MG Oral Tablet; TAKE 1 TABLET AT BEDTIME; Therapy: 87KHN8256 to (Evaluate:26Uiu6405); Last Rx:10Oct2017 Ordered    Family Psych History  Mother    1  Family history of hypertension (V17 49) (Z82 49)  Sister    2  Family history of Bipolar 1 disorder  Brother    3  Family history of CHD (coronary heart disease)  Family History    4  Family history of Hypertension (V17 49)   5  Family history of Thyroid Cancer    Social History    · Being A Social Drinker   · Daily caffeine consumption   · Inadequate exercise (V69 0) (Z72 3)   ·    · Never A Smoker   · Working Full Time    Future Appointments    Date/Time Provider Specialty Site   10/20/2017 11:00 AM ALEXX Diego   [facility]   11/06/2017 05:45 PM Mallika Dennis, DO [facility]     Signatures   Electronically signed by : MARIA DE JESUS JuradoLSW; Oct 19 2017  1:10PM EST                       (Author)    Electronically signed by : CHAYA Dumont; Oct 19 2017  2:34PM EST                       (Author)    Electronically signed by : JOVANY Cosme; Oct 19 2017  2:49PM EST                       (Author)    Electronically signed by : Cyndi Aleman ACSWLCSW; Oct 20 2017 12:40PM EST                       (Author)

## 2018-01-18 NOTE — PSYCH
History of Present Illness  [facility]:   Specialized Services Documentation - Therapist must complete separate progress note for each specific clinical activity in which the client participated during the day  (966) Group Psychotherapy: 4833-0070  Michael Grande attended psychotherapy group that focused on communication  He identified being in pain as his stressor for today  Michael Grande minimally participated in group discussion, stating that he actively involves himself in work to avoid communicating his needs in his relationships  No outward progress made towards goals  Continue psychotherapy group to explore coping and communication skills  MARIA DE JESUS Gomez Intern  Treatment Plan Problem(s): 1 1, 1 2, 1 4  Nicole Mirta MSW, LSW     (199) Group Psychotherapy: 2181-8329 Michael Grande participated in wellness group focused on identifying individual ways of self-sabotaging wellness and recovery by being driven by negative impulses  Michael Grande was attentive to education and shared in peer discussion that he does have impulse control problems, especially with buying watches  He also brought up how difficult it is for those who have problems with impulse shopping, like himself, as so much shopping can be done without even leaving home on internet sites  Michael Grande discussion several strategies he could use to divert , entertain and address his needs without compulsive shopping  Michael Grande made good progress toward goals  Continue to offer group to provide education on how giving in to destructive impulses can sabotage wellness and recovery and alternative, healthier ways to handle impulses when they occur  Treatment Plan Problem(s): 1 1,1 2  Mervat Cabrera RN       (231) Education Therapy Goals set - Work on office    Treatment Plan Problem(s): 1 1, 1 2  Education Therapy Time - 0900 - 0930 Previous goal was met  Readiness to Learning:  He is receptive to learning     There are  no barriers to learning  Learning Assessment Time - 1330 - 1400   Education completed on  illness and wellness tools  The teaching method was  verbal, written and demonstration  Shared area of learning: Yes  CHAYA Skaggs MT-BC     (327) Allied Therapy 4802-2350 Divya Walter actively shared in [facility] group focused on DBT module Mindfulness  He engaged in relaxation exercise and song observation  Group explored how to observe, describe, and participate to increase mindfulness  Group also explored diaphragmatic breathing and progressive muscle relaxation  Divya Walter shared that he often associates things with songs, and the original version of [place]â had a negative association for him, but the [facility] version had a positive one  Some progress towards goal observed  Continue AT to further encourage the use of mindfulness to promote recovery  CHAYA Skaggs  Treatment Plan Problem(s): 1 1, 1 6  JOVANY Gabriel       Case Management Note:   Flori Jason met to discuss UR review tomorrow  He was hoping for extension of LOS he stated  Does not feel "ready to go" Divya Walter stated that he has been working on getting up the courage to have necessary back surgery done that he has been putting off for some time and living with chronic pain  Divya Walter denied SI and HI    TREATMENT SESSION NUMBER: 9   Current suicide risk is low  Medications not changed/added/denied  Marlen Yarbrough RN      Active Problems    1  Bulging lumbar disc (722 10) (M51 26)   2  Chronic tension-type headache (339 12) (G44 229)   3  Generalized anxiety disorder (300 02) (F41 1)   4  Grief reaction (309 0) (F43 20)   5  High risk medication use (V58 69) (Z79 899)   6  Hyperlipidemia (272 4) (E78 5)   7  Hypertension (401 9) (I10)   8  Insomnia (780 52) (G47 00)   9  Lateral epicondylitis of right elbow (726 32) (M77 11)   10  Major depressive disorder, recurrent, severe w/o psychotic behavior (296 33) (F33 2)   11  Myofascial pain syndrome (729 1) (M79 1)   12  Need for diphtheria-tetanus-pertussis (Tdap) vaccine (V06 1) (Z23)    Past Medical History    1  History of Herniated nucleus pulposus, L5-S1, right (722 10) (M51 27)   2  History of actinic keratosis (V13 3) (Z87 2)   3  History of low back pain (V13 59) (Z87 39)   4  Denied: History of seizure   5  Denied: History of traumatic injury of head    Allergies    1  No Known Drug Allergies    Current Meds   1  Abilify 5 MG Oral Tablet; Therapy: (Recorded:11Oct2017) to Recorded   2  ARIPiprazole 5 MG Oral Tablet; TAKE 1 TABLET DAILY; Therapy: 16MCF3496 to (Evaluate:10Nov2017)  Requested for: 07PFF2201; Last   Rx:11Oct2017 Ordered   3  BusPIRone HCl - 15 MG Oral Tablet; Take 1 tablet daily; Therapy: 34IVO1310 to (Derek Hodges)  Requested for: 02Oct2017; Last   Rx:02Oct2017 Ordered   4  Escitalopram Oxalate 10 MG Oral Tablet; Take 1 tablet daily; Therapy: 84SEF9372 to (Derek Hodges)  Requested for: 02Oct2017; Last   Rx:02Oct2017 Ordered   5  Fenofibrate 145 MG Oral Tablet; Take 1 tablet daily; Therapy: 11Nvz1929 to (Last Rx:29Auf2022)  Requested for: 72Jep3757 Ordered   6  Lisinopril 20 MG Oral Tablet; TAKE 1 TABLET DAILY FOR BLOOD PRESSURE; Therapy: 61FQT1423 to (Evaluate:13Nov2017)  Requested for: 51DMH7464; Last   Rx:85Blm4776 Ordered   7  TraMADol HCl - 50 MG Oral Tablet; Take 1 tablet 4 times daily; Therapy: 79AAJ3233 to (Raheem American)  Requested for: 02ZGZ4754; Last   Rx:10Oct2017 Ordered   8  TraZODone HCl - 50 MG Oral Tablet; two tablets at bedtime  Requested for: 92PGR9205;   Last Rx:37Ika1967 Ordered   9  Zolpidem Tartrate 10 MG Oral Tablet; TAKE 1 TABLET AT BEDTIME; Therapy: 56BYK3061 to (Evaluate:49Shq7983); Last Rx:10Oct2017 Ordered    Family Psych History  Mother    1  Family history of hypertension (V17 49) (Z82 49)  Sister    2  Family history of Bipolar 1 disorder  Brother    3   Family history of CHD (coronary heart disease)  Family History    4  Family history of Hypertension (V17 49)   5  Family history of Thyroid Cancer    Social History    · Being A Social Drinker   · Daily caffeine consumption   · Inadequate exercise (V69 0) (Z72 3)   ·    · Never A Smoker   · Working Full Time    Future Appointments    Date/Time Provider Specialty Site   10/24/2017 10:30 AM Ma Collet, M D  Psychiatry [facility]   10/25/2017 10:45 AM Ma Collet, M D  Psychiatry [facility]   10/26/2017 10:30 AM Ma Collet, M D  [facility]iatry [facility]   10/27/2017 10:45 AM Ma Collet, M D   [facility]iatry [facility]   11/06/2017 05:45 PM Georgiana Dnenis,  Family Medicine [facility]     Signatures   Electronically signed by : MARTINEZ Wren; Oct 23 2017  2:49PM EST                       (Author)    Electronically signed by : MARTINEZ Wren; Oct 23 2017  3:09PM EST                       (Author)    Electronically signed by : CHAYA Hedrick; Oct 23 2017  3:15PM EST                       (Author)    Electronically signed by : JOVANY Johns; Oct 23 2017  3:17PM EST                       (Author)    Electronically signed by : Marissa Valentino RN; Oct 23 2017  3:53PM EST                       (Author)    Electronically signed by : MARTINEZ An; Oct 23 2017  4:11PM EST                       (Author)

## 2018-01-18 NOTE — PSYCH
History of Present Illness  [facility] Clinical Progress Note [facility]:   Specialized Services Documentation - Therapist must complete separate progress note for each specific clinical activity in which the client participated during the day  Case Management Note:   0800 Luis Montejo is excused today - IOP day off  Medications not changed/added/denied  Danny Whitten RN      Active Problems    1  Bulging lumbar disc (722 10) (M51 26)   2  Chronic tension-type headache (339 12) (G44 229)   3  Generalized anxiety disorder (300 02) (F41 1)   4  Grief reaction (309 0) (F43 20)   5  High risk medication use (V58 69) (Z79 899)   6  Hyperlipidemia (272 4) (E78 5)   7  Hypertension (401 9) (I10)   8  Insomnia (780 52) (G47 00)   9  Lateral epicondylitis of right elbow (726 32) (M77 11)   10  Major depressive disorder, recurrent, severe w/o psychotic behavior (296 33) (F33 2)   11  Myofascial pain syndrome (729 1) (M79 1)   12  Need for diphtheria-tetanus-pertussis (Tdap) vaccine (V06 1) (Z23)    Past Medical History    1  History of Herniated nucleus pulposus, L5-S1, right (722 10) (M51 27)   2  History of actinic keratosis (V13 3) (Z87 2)   3  History of low back pain (V13 59) (Z87 39)   4  Denied: History of seizure   5  Denied: History of traumatic injury of head    Allergies    1  No Known Drug Allergies    Current Meds   1  Abilify 5 MG Oral Tablet; Therapy: (Recorded:11Oct2017) to Recorded   2  ARIPiprazole 5 MG Oral Tablet; TAKE 1 TABLET DAILY; Therapy: 17CKP2160 to (Evaluate:10Nov2017)  Requested for: 66QMQ8367; Last   Rx:11Oct2017 Ordered   3  BusPIRone HCl - 15 MG Oral Tablet; Take 1 tablet daily; Therapy: 53FEL1912 to (51Talk)  Requested for: 02Oct2017; Last   Rx:02Oct2017 Ordered   4  Escitalopram Oxalate 10 MG Oral Tablet; Take 1 tablet daily; Therapy: 96VSO3669 to (51Talk)  Requested for: 02Oct2017; Last   Rx:02Oct2017 Ordered   5   Fenofibrate 145 MG Oral Tablet; Take 1 tablet daily; Therapy: 47Ydx1666 to (Last Rx:39Gsp4265)  Requested for: 54Wge7343 Ordered   6  Lisinopril 20 MG Oral Tablet; TAKE 1 TABLET DAILY FOR BLOOD PRESSURE; Therapy: 03SLD3033 to (Evaluate:13Nov2017)  Requested for: 75OOP8916; Last   Rx:82Plq0527 Ordered   7  TraMADol HCl - 50 MG Oral Tablet; Take 1 tablet 4 times daily; Therapy: 61OZS0406 to (Berny Eli)  Requested for: 26WBC6454; Last   Rx:10Oct2017 Ordered   8  TraZODone HCl - 50 MG Oral Tablet; two tablets at bedtime  Requested for: 79ZTR3875;   Last Rx:28Cfl9826 Ordered   9  Zolpidem Tartrate 10 MG Oral Tablet; TAKE 1 TABLET AT BEDTIME; Therapy: 98NMI4980 to (Evaluate:86Fzw2593); Last Rx:93Npo6806 Ordered    Family Psych History  Mother    1  Family history of hypertension (V17 49) (Z82 49)  Sister    2  Family history of Bipolar 1 disorder  Brother    3  Family history of CHD (coronary heart disease)  Family History    4  Family history of Hypertension (V17 49)   5  Family history of Thyroid Cancer    Social History    · Being A Social Drinker   · Daily caffeine consumption   · Inadequate exercise (V69 0) (Z72 3)   ·    · Never A Smoker   · Working Full Time    Future Appointments    Date/Time Provider Specialty Site   11/01/2017 11:45 AM ALEXX Hager  Psychiatry [facility]   11/03/2017 12:00 PM Keshav Rodriges M D   Psychiatry [facility]   02/21/2018 10:00 AM Nigel Liu DO Psychiatry [facility]   11/06/2017 05:45 PM Laura Dennis DO Family [facility]   11/10/2017 11:00 AM Laura Dennis DO [facility]     Signatures   Electronically signed by : Merrill Harley RN; Oct 31 2017 10:39AM EST                       (Author)

## 2018-01-18 NOTE — PSYCH
History of Present Illness  [facility] Clinical Progress Note [facility]:   Specialized Services Documentation - Therapist must complete separate progress note for each specific clinical activity in which the client participated during the day  (175) Group Psychotherapy: [id number] Nancie Allred participated in psychotherapy group focused on self-care over the weekend and pushing oneself out of comfort zone  Nancie Allred identified racing thoughts as a stressor  He actively engaged in group discussion, talking about working on his office this weekend to make it a more comfortable space as a way to work toward his wellness  He was supportive of a peer and provided feedback about ways to try to push oneself out of comfort zone/relinquish expectations in order to move forward  Moderate progress toward goals today  Continue psychotherapy group to encourage Nancie Allred to explore stressors and coping  Treatment Plan Problem(s): 1 1, 1 2  Serenity Sparrow MSW, LSW     (817) Group Psychotherapy: ([phone]) Nancie Allred attended the AM psychotherapy group  He claims he currently does not have SI HI intent or plan  He discussed his job and the things he is concerned about  He feels they are going to let him go  He is upset the same deparment 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 will review his claim and he knows the policies so he knows how they will possibly handle his case  He discussed his health issues  He will be getting back surgery  He again talked about his dad and mentor passing  He discussed his sister moving in  He participated and was attentive to the group discussion on mindfulness skills  Cami Yung [facility]  Treatment Plan Problem(s): 1 1 1 2        (455) Education Therapy Goals set - Work on office space    Treatment Plan Problem(s): 1 1, 1 6  Education Therapy Time - 0900 - 0930 Previous goal was met  Readiness to Learning:  He is receptive to learning  There are  no barriers to learning    Learning Assessment Time - 1330 - 1400 Education completed on  illness and wellness tools  The teaching method was  verbal and demonstration  CHAYA Bell MT-BC     (210) Allied Therapy [id number] Silvio Weber actively shared in [facility] group focused on DBT module of Emotion Regulation  He engaged in bert analysis and song-writing task  Group discussed difference between feelings, emotions, and moods, as well as the prompting events and expressions/actions of them  Group explored the emotions of sadness and happiness  He was given hand-outs on topic  Silvio Weber shared that changes of seasons makes him happy, and he feels euphoric when happy  Some progress towards goal observed and shared  Continue AT to further encourage the development of emotional regulation to promote recovery  CHAYA Bell  Treatment Plan Problem(s): 1 1, 1 6  JOVANY Villegas       Case Management Note:   [id number] Met with Silvio Weber  Reviewed treatment plan and weekend plans  Reviewed weekend supports  He would like to work on confidence in himself, boundaries and decisions  He is unsure related to his future, yet feels more hopeful about it  He does have fleeting passive SI, but it is much less intense, intrusive and frequent  He is working on converting his home office to an area for relaxation  TREATMENT SESSION NUMBER: 8   Current suicide risk is low  Medications not changed/added/denied  JOVANY Villegas      Active Problems    1  Bulging lumbar disc (722 10) (M51 26)   2  Chronic tension-type headache (339 12) (G44 229)   3  Generalized anxiety disorder (300 02) (F41 1)   4  Grief reaction (309 0) (F43 20)   5  High risk medication use (V58 69) (Z79 899)   6  Hyperlipidemia (272 4) (E78 5)   7  Hypertension (401 9) (I10)   8  Insomnia (780 52) (G47 00)   9  Lateral epicondylitis of right elbow (726 32) (M77 11)   10  Major depressive disorder, recurrent, severe w/o psychotic behavior (296 33) (F33 2)   11   Myofascial pain syndrome (729 1) (M79 1)   12  Need for diphtheria-tetanus-pertussis (Tdap) vaccine (V06 1) (Z23)    Past Medical History    1  History of Herniated nucleus pulposus, L5-S1, right (722 10) (M51 27)   2  History of actinic keratosis (V13 3) (Z87 2)   3  History of low back pain (V13 59) (Z87 39)   4  Denied: History of seizure   5  Denied: History of traumatic injury of head    Allergies    1  No Known Drug Allergies    Current Meds   1  Abilify 5 MG Oral Tablet; Therapy: (Recorded:11Oct2017) to Recorded   2  ARIPiprazole 5 MG Oral Tablet; TAKE 1 TABLET DAILY; Therapy: 54JNY8737 to (Evaluate:10Nov2017)  Requested for: 44ZOZ3664; Last   Rx:11Oct2017 Ordered   3  BusPIRone HCl - 15 MG Oral Tablet; Take 1 tablet daily; Therapy: 84ZRO5491 to (Oleta Pouch)  Requested for: 02Oct2017; Last   Rx:02Oct2017 Ordered   4  Escitalopram Oxalate 10 MG Oral Tablet; Take 1 tablet daily; Therapy: 65YIS3351 to (Oleta Pouch)  Requested for: 02Oct2017; Last   Rx:02Oct2017 Ordered   5  Fenofibrate 145 MG Oral Tablet; Take 1 tablet daily; Therapy: 35Jjo0774 to (Last Rx:75Djx4991)  Requested for: 65Tfu7649 Ordered   6  Lisinopril 20 MG Oral Tablet; TAKE 1 TABLET DAILY FOR BLOOD PRESSURE; Therapy: 05ORQ2127 to (Evaluate:13Nov2017)  Requested for: 43DXY1016; Last   Rx:54Xel9208 Ordered   7  TraMADol HCl - 50 MG Oral Tablet; Take 1 tablet 4 times daily; Therapy: 00QQP3269 to ([place] Low)  Requested for: 41ZDD2279; Last   Rx:10Oct2017 Ordered   8  TraZODone HCl - 50 MG Oral Tablet; two tablets at bedtime  Requested for: 56SBR0889;   Last Rx:59Cgj7546 Ordered   9  Zolpidem Tartrate 10 MG Oral Tablet; TAKE 1 TABLET AT BEDTIME; Therapy: 16GBM9268 to (Evaluate:87Jks0515); Last Rx:10Oct2017 Ordered    Family Psych History  Mother    1  Family history of hypertension (V17 49) (Z82 49)  Sister    2  Family history of Bipolar 1 disorder  Brother    3  Family history of CHD (coronary heart disease)  Family History    4  Family history of Hypertension (V17 49)   5  Family history of Thyroid Cancer    Social History    · Being A Social Drinker   · Daily caffeine consumption   · Inadequate exercise (V69 0) (Z72 3)   ·    · Never A Smoker   · Working Full Time    Future Appointments    Date/Time Provider Specialty Site   10/23/2017 11:00 AM ALEXX Nowak  Psychiatry [facility]   10/24/2017 10:30 AM ALEXX Nowak  [facility]iatry [facility]   10/25/2017 10:45 AM ALEXX Nowak  [facility]iatry [facility]   10/26/2017 10:30 AM ALEXX Nowak  [facility]iatry [facility]   10/27/2017 10:45 AM ALEXX Nowak   [facility]iatry [facility]   11/06/2017 05:45 PM Yvette Dennis,  Family Medicine [facility]     Signatures   Electronically signed by : MARTINEZ Jurado; Oct 20 2017  2:00PM EST                       (Author)    Electronically signed by : Cyndi Aleman ACSWLCSW; Oct 20 2017  3:20PM EST                       (Author)    Electronically signed by : JOVANY Cosme; Oct 20 2017  3:25PM EST                       (Author)    Electronically signed by : CHAYA Dumont; Oct 20 2017  3:30PM EST                       (Author)

## 2018-01-23 VITALS
DIASTOLIC BLOOD PRESSURE: 74 MMHG | BODY MASS INDEX: 30.08 KG/M2 | HEIGHT: 78 IN | WEIGHT: 260 LBS | RESPIRATION RATE: 16 BRPM | SYSTOLIC BLOOD PRESSURE: 132 MMHG | HEART RATE: 74 BPM

## 2018-01-23 VITALS
HEART RATE: 72 BPM | BODY MASS INDEX: 29.85 KG/M2 | DIASTOLIC BLOOD PRESSURE: 88 MMHG | SYSTOLIC BLOOD PRESSURE: 120 MMHG | WEIGHT: 258 LBS | HEIGHT: 78 IN

## 2018-01-23 NOTE — DISCHARGE SUMMARY
Discharge [address] Discharge Instructions [facility]:     Disposition: Home/Family  Address: [place] [address] [address], [facility]  [address]  Diagnosis: Major depressive disorder, recurrent, evere without psychotic features (F33 2), Generalized Anxiety Disorder (F41 1)  Allergies (Drug/Food): No Known Drug Allergies  Activity: you may not return to work until released by your outpatient providers , but you may drive  Diet: Regular  Follow-up appointments/Referrals: Outpatient psychiatrist appointment has been made at [facility] , [facility], [address] with Dr Deborah Bhatia, DO on 2/21/17 at [facility] and please bring completed paperwork with you to this first appointment  Dr Jonh Shelton, PCP will complete medication monitoring until appointment with Dr Deborah Egan Failing was recently seen by Willian Lim and next appointment is 1/8/18 at [facility]  Willian Lim will arrange outpatient therapist appointment  ICM/CTT: Depression/DBSA/CARL support group information explained and handouts given to Willian Lim on D/C  [facility]: [facility] [phone]  Intake/Referral/Evaluation (Non-Emergency) *NON INSURED FOR FUNDING: [place]: [phone]  Crisis Intervention (Emergency) [facility]: Katie Drummond: [phone]  [place] Crisis Intervention Hotline: 1-831.153.7852  National Suicide Crisis Hotline: 2-744.912.1556  Patient/Rep Signature: __________________________________ Date/Time: ______________             Physician Signature: ____________________________________ Date/Time: ______________            Signature: ________________________________ Date/Time: ______________            Current Meds   1  Abilify 5 MG Oral Tablet (ARIPiprazole); Therapy: (Recorded:11Oct2017) to Recorded   2   ARIPiprazole 5 MG Oral Tablet; TAKE 1 TABLET DAILY; Therapy: 71VOR9086 to (Evaluate:10Nov2017)  Requested for: 99PQU6713; Last   Rx:74Hyu2797 Ordered   3  BusPIRone HCl - 15 MG Oral Tablet; Take 1 tablet daily; Therapy: 99CXO1302 to (Carlos Downing)  Requested for: 02Oct2017; Last   Rx:33Xki6707 Ordered   4  Escitalopram Oxalate 10 MG Oral Tablet; Take 1 tablet daily; Therapy: 51PZD5780 to (Carlos Downing)  Requested for: 02Oct2017; Last   Rx:02Oct2017 Ordered   5  Fenofibrate 145 MG Oral Tablet; Take 1 tablet daily; Therapy: 55Khw7342 to (Last Rx:30Bsy7185)  Requested for: 41Kwy7450 Ordered   6  Lisinopril 20 MG Oral Tablet; TAKE 1 TABLET DAILY FOR BLOOD PRESSURE; Therapy: 88DYQ9808 to (Evaluate:13Nov2017)  Requested for: 73DTA6562; Last   Rx:89Jas9831 Ordered   7  TraMADol HCl - 50 MG Oral Tablet; Take 1 tablet 4 times daily; Therapy: 44DEQ3605 to (Parish Rodrigez)  Requested for: 49BLV2575; Last   Rx:10Oct2017 Ordered   8  TraZODone HCl - 50 MG Oral Tablet; two tablets at bedtime  Requested for: 34QCZ5130;   Last Rx:86Ngb8614 Ordered   9  Zolpidem Tartrate 10 MG Oral Tablet; TAKE 1 TABLET AT BEDTIME; Therapy: 57IHW1686 to (Evaluate:53Rhd2000); Last Rx:81Ibv0395 Ordered    Allergies    1  No Known Drug Allergies    Future Appointments    Date/Time Provider Specialty Site   11/03/2017 12:00 PM Florentin Rodriges M D   Psychiatry [facility]   11/06/2017 05:45 PM Abhay Dennis DO Family [facility]   11/10/2017 11:00 AM Abhay Dennis DO Family [facility]   02/21/2018 10:00 AM Lasha Stout DO Psychiatry [facility]     Signatures   Electronically signed by : Dayron Gresham RN; Nov 2 2017 10:20AM EST                       (Author)    Electronically signed by : Dayron Gresham RN; Nov 8 2017 10:20AM EST                       (Author)

## 2018-01-23 NOTE — MISCELLANEOUS
Message  Return to work or school:   Jennifer Monday is under my professional care  He was seen in my office on 12/6/17       Please excuse Sim Ohms from work 12/6-12/27          Signatures   Electronically signed by : Efrain Chau DO; Dec  6 2017 11:14AM EST                       (Author)

## 2018-01-23 NOTE — PSYCH
History of Present Illness  Psychotherapy Provided [facility]: Individual Psychotherapy 45 minutes provided today  Goals addressed in session:   Met with pt for a follow up session  Discussed pt's experience in the [facility] program and mentally how the pt has been doing since discharge  Pt reports that he was doing well for a while but that the last couple of weeks he has been experiencing the negative thoughts again and is now having very vivid scary dreams that cause him to worry throughout the day  Pt reports increased fatigue again and decreased motivation to do things  Pt reports that he has been trying to link with services but has not had luck  Discussed places pt has contacted and gave him some more resources to try  Will reach out to [facility] to see if there is any guidance that can be given about medication management  Pt is aware of this worker's presence at the office and will follow up as needed for support  HPI - Psych: Pt reports taking his medications as prescribed since getting out of [facility]  Pt reports that he is sleeping better with the combination of sleeping medications that he takes  Pt continues to live with multiple family members, however, has started to give them more responsibility with bills and house maintenance  Pt has been out of work since the beginning of October but is scheduled to start back on December 27th  Pt has mixed feelings about his return to work and how to manage it  Pt was calm and cooperative throughout the session  Pt's mood was depressed and affect was flat  Pt reports negative thoughts of death and self harm, however, denies that he has plan or intent to act on them   Note   Note:   Gave pt a list of area agencies to contact about linking with OP therapy and possibly medication management  Pt does have an appointment with Dr Sylvie Stock in February   This worker will reach out to [facility] staff about any guidance for the pt's treatment  Pt will follow up as needed in the future  Assessment    1   Major depressive disorder, recurrent, severe w/o psychotic behavior (296 33) (F33 2)    Signatures   Electronically signed by : Ginny Cartwright LCSW; Dec 14 2017  2:00PM EST                       (Author)

## 2018-02-07 DIAGNOSIS — I10 ESSENTIAL HYPERTENSION: ICD-10-CM

## 2018-02-07 DIAGNOSIS — F33.2 SEVERE EPISODE OF RECURRENT MAJOR DEPRESSIVE DISORDER, WITHOUT PSYCHOTIC FEATURES (HCC): Primary | ICD-10-CM

## 2018-02-07 DIAGNOSIS — F51.01 PRIMARY INSOMNIA: ICD-10-CM

## 2018-02-07 RX ORDER — ARIPIPRAZOLE 5 MG/1
1 TABLET ORAL DAILY
COMMUNITY
Start: 2017-10-11 | End: 2018-02-07 | Stop reason: SDUPTHER

## 2018-02-07 RX ORDER — ZOLPIDEM TARTRATE 10 MG/1
1 TABLET ORAL
COMMUNITY
Start: 2017-10-10 | End: 2018-02-07 | Stop reason: SDUPTHER

## 2018-02-07 RX ORDER — LISINOPRIL 20 MG/1
20 TABLET ORAL DAILY
Qty: 90 TABLET | Refills: 1 | Status: SHIPPED | OUTPATIENT
Start: 2018-02-07 | End: 2018-08-02 | Stop reason: SDUPTHER

## 2018-02-07 RX ORDER — LISINOPRIL 20 MG/1
1 TABLET ORAL DAILY
COMMUNITY
Start: 2016-04-28 | End: 2018-02-07 | Stop reason: SDUPTHER

## 2018-02-07 RX ORDER — ZOLPIDEM TARTRATE 10 MG/1
10 TABLET ORAL
Qty: 90 TABLET | Refills: 0 | OUTPATIENT
Start: 2018-02-07 | End: 2018-05-07 | Stop reason: CLARIF

## 2018-02-07 RX ORDER — ARIPIPRAZOLE 5 MG/1
5 TABLET ORAL DAILY
Qty: 90 TABLET | Refills: 1 | Status: SHIPPED | OUTPATIENT
Start: 2018-02-07 | End: 2018-08-02 | Stop reason: SDUPTHER

## 2018-03-05 DIAGNOSIS — F51.01 PRIMARY INSOMNIA: ICD-10-CM

## 2018-03-05 RX ORDER — TRAZODONE HYDROCHLORIDE 50 MG/1
2 TABLET ORAL
COMMUNITY
End: 2018-03-05 | Stop reason: SDUPTHER

## 2018-03-05 RX ORDER — TRAZODONE HYDROCHLORIDE 50 MG/1
100 TABLET ORAL
Qty: 180 TABLET | Refills: 1 | Status: SHIPPED | OUTPATIENT
Start: 2018-03-05 | End: 2018-09-04 | Stop reason: SDUPTHER

## 2018-03-07 NOTE — PSYCH
History of Present Illness    Presenting Problems: Stressors: PATIENT HAS MAJOR DEPRESSION DUE TO FAMILY , JOB AND GREIF STRESSORS  Referral Source: Dakota Pozo  He is employed  at [employer]  He is not a smoker  Symptoms: suicidal ideation, plan: NO PLAN, no self abusive behaviors, no homicidal thoughts, no history of violence toward others, depressed mood, anxiety, no psychosis, no medication noncompliance, sleep disturbances, no change in appetite, agitation, no hypomania and POOR CONCENTRATION AND CONFUSION  Provisional Diagnosis: Axis I: MDD  Substance Abuse: No substance abuse  Psychiatric Treatment History: NONE, Current Psychiatrist: NONE and Therapist: NONE   The patient does not require ambulatory assistance  Legal Issues: The patient does not have legal issues  ACCEPTED  Appointment Date: 10/11/2017        Signatures   Electronically signed by : Avril Soler, ; Oct  5 2017  1:22PM EST                       (Author)

## 2018-03-08 DIAGNOSIS — Z01.818 PREOPERATIVE CLEARANCE: Primary | ICD-10-CM

## 2018-03-08 DIAGNOSIS — M51.27 DISPLACEMENT OF LUMBOSACRAL INTERVERTEBRAL DISC: ICD-10-CM

## 2018-03-10 LAB
ALBUMIN SERPL-MCNC: 4.5 G/DL (ref 3.6–5.1)
ALBUMIN/GLOB SERPL: 1.9 (CALC) (ref 1–2.5)
ALP SERPL-CCNC: 38 U/L (ref 40–115)
ALT SERPL-CCNC: 24 U/L (ref 9–46)
APTT PPP: 28 SEC (ref 22–34)
AST SERPL-CCNC: 18 U/L (ref 10–40)
BASOPHILS # BLD AUTO: 59 CELLS/UL (ref 0–200)
BASOPHILS NFR BLD AUTO: 0.8 %
BILIRUB SERPL-MCNC: 0.5 MG/DL (ref 0.2–1.2)
BUN SERPL-MCNC: 16 MG/DL (ref 7–25)
BUN/CREAT SERPL: ABNORMAL (CALC) (ref 6–22)
CALCIUM SERPL-MCNC: 9.6 MG/DL (ref 8.6–10.3)
CHLORIDE SERPL-SCNC: 103 MMOL/L (ref 98–110)
CO2 SERPL-SCNC: 29 MMOL/L (ref 20–31)
CREAT SERPL-MCNC: 1.26 MG/DL (ref 0.6–1.35)
EOSINOPHIL # BLD AUTO: 400 CELLS/UL (ref 15–500)
EOSINOPHIL NFR BLD AUTO: 5.4 %
ERYTHROCYTE [DISTWIDTH] IN BLOOD BY AUTOMATED COUNT: 12.1 % (ref 11–15)
GLOBULIN SER CALC-MCNC: 2.4 G/DL (CALC) (ref 1.9–3.7)
GLUCOSE SERPL-MCNC: 101 MG/DL (ref 65–99)
HCT VFR BLD AUTO: 43.4 % (ref 38.5–50)
HGB BLD-MCNC: 14.5 G/DL (ref 13.2–17.1)
INR PPP: 1
LYMPHOCYTES # BLD AUTO: 3907 CELLS/UL (ref 850–3900)
LYMPHOCYTES NFR BLD AUTO: 52.8 %
MCH RBC QN AUTO: 29.2 PG (ref 27–33)
MCHC RBC AUTO-ENTMCNC: 33.4 G/DL (ref 32–36)
MCV RBC AUTO: 87.5 FL (ref 80–100)
MONOCYTES # BLD AUTO: 437 CELLS/UL (ref 200–950)
MONOCYTES NFR BLD AUTO: 5.9 %
NEUTROPHILS # BLD AUTO: 2597 CELLS/UL (ref 1500–7800)
NEUTROPHILS NFR BLD AUTO: 35.1 %
PLATELET # BLD AUTO: 267 THOUSAND/UL (ref 140–400)
PMV BLD REES-ECKER: 11 FL (ref 7.5–12.5)
POTASSIUM SERPL-SCNC: 4.4 MMOL/L (ref 3.5–5.3)
PROT SERPL-MCNC: 6.9 G/DL (ref 6.1–8.1)
PROTHROMBIN TIME: 10.6 SEC (ref 9–11.5)
RBC # BLD AUTO: 4.96 MILLION/UL (ref 4.2–5.8)
SL AMB EGFR AFRICAN AMERICAN: 80 ML/MIN/1.73M2
SL AMB EGFR NON AFRICAN AMERICAN: 69 ML/MIN/1.73M2
SODIUM SERPL-SCNC: 138 MMOL/L (ref 135–146)
WBC # BLD AUTO: 7.4 THOUSAND/UL (ref 3.8–10.8)

## 2018-03-14 DIAGNOSIS — F33.2 SEVERE RECURRENT MAJOR DEPRESSION WITHOUT PSYCHOTIC FEATURES (HCC): Primary | ICD-10-CM

## 2018-03-14 RX ORDER — ESCITALOPRAM OXALATE 10 MG/1
1 TABLET ORAL DAILY
COMMUNITY
Start: 2017-10-02 | End: 2018-03-14 | Stop reason: SDUPTHER

## 2018-03-14 RX ORDER — ESCITALOPRAM OXALATE 10 MG/1
10 TABLET ORAL DAILY
Qty: 90 TABLET | Refills: 1 | Status: SHIPPED | OUTPATIENT
Start: 2018-03-14 | End: 2018-09-04 | Stop reason: SDUPTHER

## 2018-03-19 ENCOUNTER — OFFICE VISIT (OUTPATIENT)
Dept: FAMILY MEDICINE CLINIC | Facility: CLINIC | Age: 43
End: 2018-03-19
Payer: COMMERCIAL

## 2018-03-19 VITALS
TEMPERATURE: 97.8 F | BODY MASS INDEX: 29.97 KG/M2 | RESPIRATION RATE: 14 BRPM | WEIGHT: 259 LBS | DIASTOLIC BLOOD PRESSURE: 80 MMHG | HEART RATE: 64 BPM | HEIGHT: 78 IN | SYSTOLIC BLOOD PRESSURE: 120 MMHG

## 2018-03-19 DIAGNOSIS — I10 ESSENTIAL HYPERTENSION: ICD-10-CM

## 2018-03-19 DIAGNOSIS — M51.26 BULGING LUMBAR DISC: Primary | ICD-10-CM

## 2018-03-19 DIAGNOSIS — F33.2 MAJOR DEPRESSIVE DISORDER, RECURRENT, SEVERE W/O PSYCHOTIC BEHAVIOR (HCC): ICD-10-CM

## 2018-03-19 PROBLEM — F41.1 GENERALIZED ANXIETY DISORDER: Status: ACTIVE | Noted: 2017-10-11

## 2018-03-19 PROBLEM — R20.0 NUMBNESS: Status: ACTIVE | Noted: 2018-01-08

## 2018-03-19 PROCEDURE — 3008F BODY MASS INDEX DOCD: CPT | Performed by: FAMILY MEDICINE

## 2018-03-19 PROCEDURE — 3074F SYST BP LT 130 MM HG: CPT | Performed by: FAMILY MEDICINE

## 2018-03-19 PROCEDURE — 99214 OFFICE O/P EST MOD 30 MIN: CPT | Performed by: FAMILY MEDICINE

## 2018-03-19 PROCEDURE — 3079F DIAST BP 80-89 MM HG: CPT | Performed by: FAMILY MEDICINE

## 2018-03-19 RX ORDER — FENOFIBRATE 145 MG/1
1 TABLET, COATED ORAL DAILY
COMMUNITY
Start: 2016-04-26 | End: 2018-05-23 | Stop reason: SDUPTHER

## 2018-03-19 RX ORDER — TRAMADOL HYDROCHLORIDE 50 MG/1
2 TABLET ORAL EVERY 4 HOURS
COMMUNITY
End: 2018-05-07 | Stop reason: SDUPTHER

## 2018-03-19 RX ORDER — GABAPENTIN 300 MG/1
1 CAPSULE ORAL 2 TIMES DAILY
COMMUNITY
Start: 2018-01-08 | End: 2018-06-20 | Stop reason: SDUPTHER

## 2018-03-19 RX ORDER — BUSPIRONE HYDROCHLORIDE 15 MG/1
1 TABLET ORAL DAILY
COMMUNITY
Start: 2017-10-02 | End: 2018-05-23 | Stop reason: SDUPTHER

## 2018-03-19 NOTE — PROGRESS NOTES
Assessment/Plan:     Diagnoses and all orders for this visit:    Bulging lumbar disc    Major depressive disorder, recurrent, severe w/o psychotic behavior ([address] )    Essential hypertension    Patient is cleared for surgery  No acute complaints  Stable otherwise  Labs is reviewed and normal  Form filled out and sent          Subjective:     Chief Complaint   Patient presents with    Pre-op Exam     laminertomy at [facility] on 3/27/18 with Dr Linh Garrett MD         Patient ID: Shelly Boogie is a 38 y o  male  Here for pre-op clearance  For back surgery  Dr Gera Vigil-- 3/27  No acute complaints  The following portions of the patient's history were reviewed and updated as appropriate: allergies, current medications, past family history, past medical history, past social history, past surgical history and problem list     Review of Systems   Constitutional: Negative  HENT: Negative  Eyes: Negative  Respiratory: Negative  Cardiovascular: Negative  Gastrointestinal: Negative  Endocrine: Negative  Genitourinary: Negative  Musculoskeletal: Negative  Skin: Negative  Allergic/Immunologic: Negative  Neurological: Negative  Hematological: Negative  Psychiatric/Behavioral: Negative  All other systems reviewed and are negative  Objective:    Vitals:    03/19/18 1125   BP: 120/80   BP Location: Left arm   Patient Position: Sitting   Cuff Size: Large   Pulse: 64   Resp: 14   Temp: 97 8 °F (36 6 °C)   TempSrc: Tympanic   Weight: 117 kg (259 lb)   Height: 6' 6" (1 981 m)          Physical Exam   Constitutional: He is oriented to person, place, and time  He appears well-developed and well-nourished  No distress  HENT:   Head: Normocephalic     Right Ear: External ear normal    Left Ear: External ear normal    Nose: Nose normal    Mouth/Throat: Oropharynx is clear and moist    Eyes: Conjunctivae and EOM are normal  Pupils are equal, round, and reactive to light  Right eye exhibits no discharge  Left eye exhibits no discharge  Neck: Normal range of motion  Cardiovascular: Normal rate, regular rhythm and normal heart sounds  Pulmonary/Chest: Effort normal and breath sounds normal    Abdominal: Soft  Bowel sounds are normal  He exhibits no distension  There is no tenderness  Musculoskeletal: Normal range of motion  Neurological: He is alert and oriented to person, place, and time  No cranial nerve deficit  Skin: Skin is warm and dry  No rash noted  Psychiatric: He has a normal mood and affect  His behavior is normal  Judgment and thought content normal    Nursing note and vitals reviewed

## 2018-03-27 ENCOUNTER — TELEPHONE (OUTPATIENT)
Dept: FAMILY MEDICINE CLINIC | Facility: CLINIC | Age: 43
End: 2018-03-27

## 2018-03-29 ENCOUNTER — TELEPHONE (OUTPATIENT)
Dept: FAMILY MEDICINE CLINIC | Facility: CLINIC | Age: 43
End: 2018-03-29

## 2018-03-29 NOTE — TELEPHONE ENCOUNTER
Yanique Oden from [facility] called again  He said [employer] insurance will not cover Restoril either for 90 days  Call [phone] with reference # [id number]

## 2018-05-02 RX ORDER — GABAPENTIN 300 MG/1
CAPSULE ORAL
Qty: 60 CAPSULE | Refills: 3 | OUTPATIENT
Start: 2018-05-02

## 2018-05-07 ENCOUNTER — OFFICE VISIT (OUTPATIENT)
Dept: FAMILY MEDICINE CLINIC | Facility: CLINIC | Age: 43
End: 2018-05-07
Payer: COMMERCIAL

## 2018-05-07 VITALS
SYSTOLIC BLOOD PRESSURE: 140 MMHG | WEIGHT: 262 LBS | DIASTOLIC BLOOD PRESSURE: 78 MMHG | BODY MASS INDEX: 30.31 KG/M2 | HEART RATE: 78 BPM | HEIGHT: 78 IN

## 2018-05-07 DIAGNOSIS — I10 ESSENTIAL HYPERTENSION: ICD-10-CM

## 2018-05-07 DIAGNOSIS — M54.9 CHRONIC BACK PAIN, UNSPECIFIED BACK LOCATION, UNSPECIFIED BACK PAIN LATERALITY: Primary | ICD-10-CM

## 2018-05-07 DIAGNOSIS — F33.2 MAJOR DEPRESSIVE DISORDER, RECURRENT, SEVERE W/O PSYCHOTIC BEHAVIOR (HCC): ICD-10-CM

## 2018-05-07 DIAGNOSIS — G89.29 CHRONIC BACK PAIN, UNSPECIFIED BACK LOCATION, UNSPECIFIED BACK PAIN LATERALITY: Primary | ICD-10-CM

## 2018-05-07 PROCEDURE — 1036F TOBACCO NON-USER: CPT | Performed by: FAMILY MEDICINE

## 2018-05-07 PROCEDURE — 99214 OFFICE O/P EST MOD 30 MIN: CPT | Performed by: FAMILY MEDICINE

## 2018-05-07 RX ORDER — TRAMADOL HYDROCHLORIDE 50 MG/1
100 TABLET ORAL EVERY 4 HOURS
Qty: 720 TABLET | Refills: 0 | Status: SHIPPED | OUTPATIENT
Start: 2018-05-07 | End: 2018-08-05

## 2018-05-07 NOTE — PROGRESS NOTES
Assessment/Plan:     Diagnoses and all orders for this visit:    Chronic back pain, unspecified back location, unspecified back pain laterality  -     traMADol (ULTRAM) 50 mg tablet; Take 2 tablets (100 mg total) by mouth every 4 (four) hours for 90 days    Major depressive disorder, recurrent, severe w/o psychotic behavior ([facility] [address] )    Essential hypertension        Continue meds   follow-up with back surgeon  Patient is stable at this point  He can follow up in 6 months or as needed    Subjective:     Chief Complaint   Patient presents with    Follow-up     4 month checkup        Patient ID: Tosin Hilton is a 38 y o  male  Here for 4 month checkup  No acute complaints   Still recovering from back surgery  Needs refill of tramadol  Temazepam is working for his sleep        The following portions of the patient's history were reviewed and updated as appropriate: allergies, current medications, past family history, past medical history, past social history, past surgical history and problem list     Review of Systems   Constitutional: Negative  HENT: Negative  Eyes: Negative  Respiratory: Negative  Cardiovascular: Negative  Gastrointestinal: Negative  Endocrine: Negative  Genitourinary: Negative  Musculoskeletal: Positive for arthralgias and back pain  Skin: Negative  Allergic/Immunologic: Negative  Neurological: Negative  Hematological: Negative  Psychiatric/Behavioral: Negative  All other systems reviewed and are negative  Objective:    Vitals:    05/07/18 1813   BP: 140/78   BP Location: Left arm   Patient Position: Sitting   Cuff Size: Standard   Pulse: 78   Weight: 119 kg (262 lb)   Height: 6' 6" (1 981 m)          Physical Exam   Constitutional: He is oriented to person, place, and time  He appears well-developed and well-nourished  No distress  HENT:   Head: Normocephalic     Right Ear: External ear normal    Left Ear: External ear normal    Nose: Nose normal    Mouth/Throat: Oropharynx is clear and moist    Eyes: Conjunctivae and EOM are normal  Pupils are equal, round, and reactive to light  Right eye exhibits no discharge  Left eye exhibits no discharge  Neck: Normal range of motion  Cardiovascular: Normal rate, regular rhythm and normal heart sounds  Pulmonary/Chest: Effort normal and breath sounds normal    Abdominal: Soft  Bowel sounds are normal  He exhibits no distension  There is no tenderness  Musculoskeletal: Normal range of motion  Neurological: He is alert and oriented to person, place, and time  No cranial nerve deficit  Skin: Skin is warm and dry  No rash noted  Psychiatric: He has a normal mood and affect  His behavior is normal  Judgment and thought content normal    Nursing note and vitals reviewed

## 2018-05-23 DIAGNOSIS — E78.5 HYPERLIPIDEMIA, UNSPECIFIED HYPERLIPIDEMIA TYPE: ICD-10-CM

## 2018-05-23 DIAGNOSIS — F33.2 SEVERE RECURRENT MAJOR DEPRESSION WITHOUT PSYCHOTIC FEATURES (HCC): Primary | ICD-10-CM

## 2018-05-24 RX ORDER — FENOFIBRATE 145 MG/1
145 TABLET, COATED ORAL DAILY
Qty: 90 TABLET | Refills: 1 | Status: SHIPPED | OUTPATIENT
Start: 2018-05-24 | End: 2018-12-26 | Stop reason: SDUPTHER

## 2018-05-24 RX ORDER — BUSPIRONE HYDROCHLORIDE 15 MG/1
15 TABLET ORAL DAILY
Qty: 90 TABLET | Refills: 1 | Status: SHIPPED | OUTPATIENT
Start: 2018-05-24 | End: 2018-12-26 | Stop reason: SDUPTHER

## 2018-06-01 DIAGNOSIS — G47.00 INSOMNIA, UNSPECIFIED TYPE: ICD-10-CM

## 2018-06-01 RX ORDER — TEMAZEPAM 15 MG/1
15 CAPSULE ORAL
Qty: 30 CAPSULE | Refills: 1 | Status: SHIPPED | OUTPATIENT
Start: 2018-06-01 | End: 2018-08-02 | Stop reason: SDUPTHER

## 2018-06-07 RX ORDER — GABAPENTIN 300 MG/1
CAPSULE ORAL
Qty: 60 CAPSULE | Refills: 3 | OUTPATIENT
Start: 2018-06-07

## 2018-06-20 DIAGNOSIS — R20.0 NUMBNESS: Primary | ICD-10-CM

## 2018-06-21 RX ORDER — GABAPENTIN 300 MG/1
300 CAPSULE ORAL 2 TIMES DAILY
Qty: 180 CAPSULE | Refills: 1 | Status: SHIPPED | OUTPATIENT
Start: 2018-06-21 | End: 2018-11-26 | Stop reason: SDUPTHER

## 2018-07-30 DIAGNOSIS — F33.2 SEVERE EPISODE OF RECURRENT MAJOR DEPRESSIVE DISORDER, WITHOUT PSYCHOTIC FEATURES (HCC): ICD-10-CM

## 2018-07-30 RX ORDER — ARIPIPRAZOLE 5 MG/1
TABLET ORAL
Qty: 90 TABLET | Refills: 1 | OUTPATIENT
Start: 2018-07-30

## 2018-08-02 DIAGNOSIS — I10 ESSENTIAL HYPERTENSION: ICD-10-CM

## 2018-08-02 DIAGNOSIS — F33.2 SEVERE EPISODE OF RECURRENT MAJOR DEPRESSIVE DISORDER, WITHOUT PSYCHOTIC FEATURES (HCC): ICD-10-CM

## 2018-08-02 DIAGNOSIS — G47.00 INSOMNIA, UNSPECIFIED TYPE: ICD-10-CM

## 2018-08-02 RX ORDER — ARIPIPRAZOLE 5 MG/1
5 TABLET ORAL DAILY
Qty: 90 TABLET | Refills: 1 | Status: SHIPPED | OUTPATIENT
Start: 2018-08-02 | End: 2019-01-31 | Stop reason: SDUPTHER

## 2018-08-02 RX ORDER — LISINOPRIL 20 MG/1
20 TABLET ORAL DAILY
Qty: 90 TABLET | Refills: 1 | Status: SHIPPED | OUTPATIENT
Start: 2018-08-02 | End: 2019-01-31 | Stop reason: SDUPTHER

## 2018-08-02 RX ORDER — TEMAZEPAM 15 MG/1
15 CAPSULE ORAL
Qty: 90 CAPSULE | Refills: 1 | Status: SHIPPED | OUTPATIENT
Start: 2018-08-02 | End: 2019-01-31 | Stop reason: SDUPTHER

## 2018-09-03 DIAGNOSIS — F51.01 PRIMARY INSOMNIA: ICD-10-CM

## 2018-09-03 RX ORDER — TRAZODONE HYDROCHLORIDE 50 MG/1
TABLET ORAL
Qty: 180 TABLET | Refills: 1 | OUTPATIENT
Start: 2018-09-03

## 2018-09-04 DIAGNOSIS — F33.2 SEVERE RECURRENT MAJOR DEPRESSION WITHOUT PSYCHOTIC FEATURES (HCC): ICD-10-CM

## 2018-09-04 DIAGNOSIS — F51.01 PRIMARY INSOMNIA: ICD-10-CM

## 2018-09-04 RX ORDER — TRAZODONE HYDROCHLORIDE 50 MG/1
100 TABLET ORAL
Qty: 180 TABLET | Refills: 0 | Status: SHIPPED | OUTPATIENT
Start: 2018-09-04 | End: 2018-11-26 | Stop reason: SDUPTHER

## 2018-09-04 RX ORDER — ESCITALOPRAM OXALATE 10 MG/1
10 TABLET ORAL DAILY
Qty: 90 TABLET | Refills: 1 | Status: SHIPPED | OUTPATIENT
Start: 2018-09-04 | End: 2018-09-14 | Stop reason: SDUPTHER

## 2018-09-14 DIAGNOSIS — F33.2 SEVERE RECURRENT MAJOR DEPRESSION WITHOUT PSYCHOTIC FEATURES (HCC): ICD-10-CM

## 2018-09-14 RX ORDER — ESCITALOPRAM OXALATE 10 MG/1
10 TABLET ORAL DAILY
Qty: 90 TABLET | Refills: 1 | Status: SHIPPED | OUTPATIENT
Start: 2018-09-14 | End: 2018-11-26 | Stop reason: SDUPTHER

## 2018-10-12 DIAGNOSIS — M51.26 BULGING LUMBAR DISC: ICD-10-CM

## 2018-10-12 DIAGNOSIS — M79.18 MYOFASCIAL PAIN SYNDROME: Primary | ICD-10-CM

## 2018-10-12 RX ORDER — TRAMADOL HYDROCHLORIDE 50 MG/1
TABLET ORAL
Refills: 0 | COMMUNITY
Start: 2018-08-27 | End: 2018-10-12 | Stop reason: SDUPTHER

## 2018-10-12 RX ORDER — TRAMADOL HYDROCHLORIDE 50 MG/1
TABLET ORAL
Qty: 180 TABLET | Refills: 0 | Status: SHIPPED | OUTPATIENT
Start: 2018-10-12 | End: 2018-11-19 | Stop reason: SDUPTHER

## 2018-11-13 DIAGNOSIS — E78.5 HYPERLIPIDEMIA, UNSPECIFIED HYPERLIPIDEMIA TYPE: ICD-10-CM

## 2018-11-13 DIAGNOSIS — F33.2 SEVERE RECURRENT MAJOR DEPRESSION WITHOUT PSYCHOTIC FEATURES (HCC): ICD-10-CM

## 2018-11-13 RX ORDER — FENOFIBRATE 145 MG/1
TABLET, COATED ORAL
Qty: 90 TABLET | Refills: 1 | OUTPATIENT
Start: 2018-11-13

## 2018-11-13 RX ORDER — BUSPIRONE HYDROCHLORIDE 15 MG/1
TABLET ORAL
Qty: 90 TABLET | Refills: 1 | OUTPATIENT
Start: 2018-11-13

## 2018-11-19 DIAGNOSIS — M79.18 MYOFASCIAL PAIN SYNDROME: ICD-10-CM

## 2018-11-19 DIAGNOSIS — M51.26 BULGING LUMBAR DISC: ICD-10-CM

## 2018-11-19 RX ORDER — TRAMADOL HYDROCHLORIDE 50 MG/1
TABLET ORAL
Qty: 180 TABLET | Refills: 0 | Status: SHIPPED | OUTPATIENT
Start: 2018-11-19 | End: 2018-12-26 | Stop reason: SDUPTHER

## 2018-11-26 DIAGNOSIS — R20.0 NUMBNESS: ICD-10-CM

## 2018-11-26 DIAGNOSIS — F51.01 PRIMARY INSOMNIA: ICD-10-CM

## 2018-11-26 DIAGNOSIS — F33.2 SEVERE RECURRENT MAJOR DEPRESSION WITHOUT PSYCHOTIC FEATURES (HCC): ICD-10-CM

## 2018-11-26 RX ORDER — TRAZODONE HYDROCHLORIDE 50 MG/1
100 TABLET ORAL
Qty: 180 TABLET | Refills: 1 | Status: SHIPPED | OUTPATIENT
Start: 2018-11-26 | End: 2019-03-20 | Stop reason: SDUPTHER

## 2018-11-26 RX ORDER — GABAPENTIN 300 MG/1
300 CAPSULE ORAL 2 TIMES DAILY
Qty: 180 CAPSULE | Refills: 1 | Status: SHIPPED | OUTPATIENT
Start: 2018-11-26 | End: 2019-06-26 | Stop reason: SDUPTHER

## 2018-11-26 RX ORDER — ESCITALOPRAM OXALATE 10 MG/1
10 TABLET ORAL DAILY
Qty: 90 TABLET | Refills: 1 | Status: SHIPPED | OUTPATIENT
Start: 2018-11-26 | End: 2019-08-08 | Stop reason: SDUPTHER

## 2018-12-26 DIAGNOSIS — F33.2 SEVERE RECURRENT MAJOR DEPRESSION WITHOUT PSYCHOTIC FEATURES (HCC): ICD-10-CM

## 2018-12-26 DIAGNOSIS — M51.26 BULGING LUMBAR DISC: ICD-10-CM

## 2018-12-26 DIAGNOSIS — E78.5 HYPERLIPIDEMIA, UNSPECIFIED HYPERLIPIDEMIA TYPE: ICD-10-CM

## 2018-12-26 DIAGNOSIS — M79.18 MYOFASCIAL PAIN SYNDROME: ICD-10-CM

## 2018-12-26 RX ORDER — FENOFIBRATE 145 MG/1
145 TABLET, COATED ORAL DAILY
Qty: 90 TABLET | Refills: 1 | Status: SHIPPED | OUTPATIENT
Start: 2018-12-26 | End: 2019-06-26 | Stop reason: SDUPTHER

## 2018-12-26 RX ORDER — TRAMADOL HYDROCHLORIDE 50 MG/1
TABLET ORAL
Qty: 180 TABLET | Refills: 0 | Status: SHIPPED | OUTPATIENT
Start: 2018-12-26 | End: 2019-01-31 | Stop reason: SDUPTHER

## 2018-12-26 RX ORDER — BUSPIRONE HYDROCHLORIDE 15 MG/1
15 TABLET ORAL DAILY
Qty: 90 TABLET | Refills: 1 | Status: SHIPPED | OUTPATIENT
Start: 2018-12-26 | End: 2019-08-08

## 2019-01-25 DIAGNOSIS — I10 ESSENTIAL HYPERTENSION: ICD-10-CM

## 2019-01-25 DIAGNOSIS — F33.2 SEVERE EPISODE OF RECURRENT MAJOR DEPRESSIVE DISORDER, WITHOUT PSYCHOTIC FEATURES (HCC): ICD-10-CM

## 2019-01-25 RX ORDER — LISINOPRIL 20 MG/1
TABLET ORAL
Qty: 90 TABLET | Refills: 1 | OUTPATIENT
Start: 2019-01-25

## 2019-01-25 RX ORDER — ARIPIPRAZOLE 5 MG/1
TABLET ORAL
Qty: 90 TABLET | Refills: 1 | OUTPATIENT
Start: 2019-01-25

## 2019-01-31 DIAGNOSIS — F33.2 SEVERE EPISODE OF RECURRENT MAJOR DEPRESSIVE DISORDER, WITHOUT PSYCHOTIC FEATURES (HCC): ICD-10-CM

## 2019-01-31 DIAGNOSIS — G47.00 INSOMNIA, UNSPECIFIED TYPE: ICD-10-CM

## 2019-01-31 DIAGNOSIS — M51.26 BULGING LUMBAR DISC: ICD-10-CM

## 2019-01-31 DIAGNOSIS — I10 ESSENTIAL HYPERTENSION: ICD-10-CM

## 2019-01-31 DIAGNOSIS — M79.18 MYOFASCIAL PAIN SYNDROME: ICD-10-CM

## 2019-02-01 RX ORDER — TRAMADOL HYDROCHLORIDE 50 MG/1
TABLET ORAL
Qty: 180 TABLET | Refills: 0 | Status: SHIPPED | OUTPATIENT
Start: 2019-02-01 | End: 2019-03-11 | Stop reason: SDUPTHER

## 2019-02-01 RX ORDER — TEMAZEPAM 15 MG/1
15 CAPSULE ORAL
Qty: 90 CAPSULE | Refills: 1 | Status: SHIPPED | OUTPATIENT
Start: 2019-02-01 | End: 2019-06-26 | Stop reason: SDUPTHER

## 2019-02-01 RX ORDER — ARIPIPRAZOLE 5 MG/1
5 TABLET ORAL DAILY
Qty: 90 TABLET | Refills: 1 | Status: SHIPPED | OUTPATIENT
Start: 2019-02-01 | End: 2019-08-08 | Stop reason: SDUPTHER

## 2019-02-01 RX ORDER — LISINOPRIL 20 MG/1
20 TABLET ORAL DAILY
Qty: 90 TABLET | Refills: 1 | Status: SHIPPED | OUTPATIENT
Start: 2019-02-01 | End: 2019-06-26 | Stop reason: SDUPTHER

## 2019-03-11 DIAGNOSIS — M51.26 BULGING LUMBAR DISC: ICD-10-CM

## 2019-03-11 DIAGNOSIS — M79.18 MYOFASCIAL PAIN SYNDROME: ICD-10-CM

## 2019-03-11 RX ORDER — TRAMADOL HYDROCHLORIDE 50 MG/1
TABLET ORAL
Qty: 180 TABLET | Refills: 0 | Status: SHIPPED | OUTPATIENT
Start: 2019-03-11 | End: 2019-04-16 | Stop reason: SDUPTHER

## 2019-03-20 DIAGNOSIS — F51.01 PRIMARY INSOMNIA: ICD-10-CM

## 2019-03-21 RX ORDER — TRAZODONE HYDROCHLORIDE 50 MG/1
100 TABLET ORAL
Qty: 180 TABLET | Refills: 1 | Status: SHIPPED | OUTPATIENT
Start: 2019-03-21 | End: 2019-06-26 | Stop reason: SDUPTHER

## 2019-04-16 DIAGNOSIS — M79.18 MYOFASCIAL PAIN SYNDROME: ICD-10-CM

## 2019-04-16 DIAGNOSIS — M51.26 BULGING LUMBAR DISC: ICD-10-CM

## 2019-04-16 RX ORDER — TRAMADOL HYDROCHLORIDE 50 MG/1
TABLET ORAL
Qty: 180 TABLET | Refills: 0 | Status: SHIPPED | OUTPATIENT
Start: 2019-04-16 | End: 2019-05-22 | Stop reason: SDUPTHER

## 2019-05-22 DIAGNOSIS — M79.18 MYOFASCIAL PAIN SYNDROME: ICD-10-CM

## 2019-05-22 DIAGNOSIS — M51.26 BULGING LUMBAR DISC: ICD-10-CM

## 2019-05-23 RX ORDER — TRAMADOL HYDROCHLORIDE 50 MG/1
TABLET ORAL
Qty: 180 TABLET | Refills: 0 | Status: SHIPPED | OUTPATIENT
Start: 2019-05-23 | End: 2019-06-26 | Stop reason: SDUPTHER

## 2019-06-06 DIAGNOSIS — R20.0 NUMBNESS: ICD-10-CM

## 2019-06-06 RX ORDER — GABAPENTIN 300 MG/1
CAPSULE ORAL
Qty: 180 CAPSULE | Refills: 1 | OUTPATIENT
Start: 2019-06-06

## 2019-06-13 DIAGNOSIS — E78.5 HYPERLIPIDEMIA, UNSPECIFIED HYPERLIPIDEMIA TYPE: ICD-10-CM

## 2019-06-13 RX ORDER — FENOFIBRATE 145 MG/1
TABLET, COATED ORAL
Qty: 90 TABLET | Refills: 1 | OUTPATIENT
Start: 2019-06-13

## 2019-06-26 DIAGNOSIS — M79.18 MYOFASCIAL PAIN SYNDROME: ICD-10-CM

## 2019-06-26 DIAGNOSIS — F51.01 PRIMARY INSOMNIA: ICD-10-CM

## 2019-06-26 DIAGNOSIS — M51.26 BULGING LUMBAR DISC: ICD-10-CM

## 2019-06-26 DIAGNOSIS — R20.0 NUMBNESS: ICD-10-CM

## 2019-06-26 DIAGNOSIS — E78.5 HYPERLIPIDEMIA, UNSPECIFIED HYPERLIPIDEMIA TYPE: ICD-10-CM

## 2019-06-26 DIAGNOSIS — G47.00 INSOMNIA, UNSPECIFIED TYPE: ICD-10-CM

## 2019-06-26 DIAGNOSIS — I10 ESSENTIAL HYPERTENSION: ICD-10-CM

## 2019-06-26 RX ORDER — TEMAZEPAM 15 MG/1
15 CAPSULE ORAL
Qty: 90 CAPSULE | Refills: 0 | Status: SHIPPED | OUTPATIENT
Start: 2019-06-26 | End: 2019-08-08

## 2019-06-26 RX ORDER — TRAZODONE HYDROCHLORIDE 50 MG/1
100 TABLET ORAL
Qty: 180 TABLET | Refills: 0 | Status: SHIPPED | OUTPATIENT
Start: 2019-06-26 | End: 2019-08-08 | Stop reason: SDUPTHER

## 2019-06-26 RX ORDER — GABAPENTIN 300 MG/1
300 CAPSULE ORAL 2 TIMES DAILY
Qty: 180 CAPSULE | Refills: 0 | Status: SHIPPED | OUTPATIENT
Start: 2019-06-26 | End: 2019-08-08 | Stop reason: SDUPTHER

## 2019-06-26 RX ORDER — TRAMADOL HYDROCHLORIDE 50 MG/1
TABLET ORAL
Qty: 90 TABLET | Refills: 0 | Status: SHIPPED | OUTPATIENT
Start: 2019-06-26 | End: 2019-07-22 | Stop reason: SDUPTHER

## 2019-06-26 RX ORDER — FENOFIBRATE 145 MG/1
145 TABLET, COATED ORAL DAILY
Qty: 90 TABLET | Refills: 0 | Status: SHIPPED | OUTPATIENT
Start: 2019-06-26 | End: 2019-08-08 | Stop reason: SDUPTHER

## 2019-06-26 RX ORDER — LISINOPRIL 20 MG/1
20 TABLET ORAL DAILY
Qty: 90 TABLET | Refills: 0 | Status: SHIPPED | OUTPATIENT
Start: 2019-06-26 | End: 2019-08-08 | Stop reason: SDUPTHER

## 2019-07-20 DIAGNOSIS — F33.2 SEVERE EPISODE OF RECURRENT MAJOR DEPRESSIVE DISORDER, WITHOUT PSYCHOTIC FEATURES (HCC): ICD-10-CM

## 2019-07-22 DIAGNOSIS — M51.26 BULGING LUMBAR DISC: ICD-10-CM

## 2019-07-22 DIAGNOSIS — M79.18 MYOFASCIAL PAIN SYNDROME: ICD-10-CM

## 2019-07-22 RX ORDER — TRAMADOL HYDROCHLORIDE 50 MG/1
TABLET ORAL
Qty: 30 TABLET | Refills: 0 | Status: SHIPPED | OUTPATIENT
Start: 2019-07-22 | End: 2019-08-08 | Stop reason: SDUPTHER

## 2019-07-24 RX ORDER — ARIPIPRAZOLE 5 MG/1
TABLET ORAL
Qty: 90 TABLET | Refills: 1 | OUTPATIENT
Start: 2019-07-24

## 2019-08-08 ENCOUNTER — OFFICE VISIT (OUTPATIENT)
Dept: FAMILY MEDICINE CLINIC | Facility: CLINIC | Age: 44
End: 2019-08-08
Payer: COMMERCIAL

## 2019-08-08 VITALS
HEIGHT: 78 IN | TEMPERATURE: 98.7 F | SYSTOLIC BLOOD PRESSURE: 120 MMHG | DIASTOLIC BLOOD PRESSURE: 70 MMHG | OXYGEN SATURATION: 94 % | WEIGHT: 275.6 LBS | HEART RATE: 82 BPM | RESPIRATION RATE: 18 BRPM | BODY MASS INDEX: 31.89 KG/M2

## 2019-08-08 DIAGNOSIS — F33.2 SEVERE RECURRENT MAJOR DEPRESSION WITHOUT PSYCHOTIC FEATURES (HCC): ICD-10-CM

## 2019-08-08 DIAGNOSIS — M51.26 BULGING LUMBAR DISC: ICD-10-CM

## 2019-08-08 DIAGNOSIS — F51.01 PRIMARY INSOMNIA: ICD-10-CM

## 2019-08-08 DIAGNOSIS — F33.2 SEVERE EPISODE OF RECURRENT MAJOR DEPRESSIVE DISORDER, WITHOUT PSYCHOTIC FEATURES (HCC): ICD-10-CM

## 2019-08-08 DIAGNOSIS — I10 ESSENTIAL HYPERTENSION: ICD-10-CM

## 2019-08-08 DIAGNOSIS — F33.2 MAJOR DEPRESSIVE DISORDER, RECURRENT, SEVERE W/O PSYCHOTIC BEHAVIOR (HCC): Primary | ICD-10-CM

## 2019-08-08 DIAGNOSIS — F51.05 INSOMNIA DUE TO OTHER MENTAL DISORDER: ICD-10-CM

## 2019-08-08 DIAGNOSIS — F99 INSOMNIA DUE TO OTHER MENTAL DISORDER: ICD-10-CM

## 2019-08-08 DIAGNOSIS — E78.5 HYPERLIPIDEMIA, UNSPECIFIED HYPERLIPIDEMIA TYPE: ICD-10-CM

## 2019-08-08 DIAGNOSIS — E66.9 OBESITY (BMI 30.0-34.9): ICD-10-CM

## 2019-08-08 DIAGNOSIS — F41.1 GENERALIZED ANXIETY DISORDER: ICD-10-CM

## 2019-08-08 DIAGNOSIS — M79.18 MYOFASCIAL PAIN SYNDROME: ICD-10-CM

## 2019-08-08 DIAGNOSIS — R20.0 NUMBNESS: ICD-10-CM

## 2019-08-08 DIAGNOSIS — Z12.5 SCREENING FOR PROSTATE CANCER: ICD-10-CM

## 2019-08-08 DIAGNOSIS — Z13.6 SCREENING FOR CARDIOVASCULAR CONDITION: ICD-10-CM

## 2019-08-08 PROCEDURE — 99214 OFFICE O/P EST MOD 30 MIN: CPT | Performed by: FAMILY MEDICINE

## 2019-08-08 PROCEDURE — 3008F BODY MASS INDEX DOCD: CPT | Performed by: FAMILY MEDICINE

## 2019-08-08 PROCEDURE — 3074F SYST BP LT 130 MM HG: CPT | Performed by: FAMILY MEDICINE

## 2019-08-08 PROCEDURE — 1036F TOBACCO NON-USER: CPT | Performed by: FAMILY MEDICINE

## 2019-08-08 RX ORDER — ESCITALOPRAM OXALATE 10 MG/1
10 TABLET ORAL DAILY
Qty: 90 TABLET | Refills: 1 | Status: SHIPPED | OUTPATIENT
Start: 2019-08-08 | End: 2020-02-12 | Stop reason: SDUPTHER

## 2019-08-08 RX ORDER — BUPROPION HYDROCHLORIDE 75 MG/1
75 TABLET ORAL 2 TIMES DAILY
Qty: 60 TABLET | Refills: 1 | Status: SHIPPED | OUTPATIENT
Start: 2019-08-08 | End: 2019-10-09 | Stop reason: SDUPTHER

## 2019-08-08 RX ORDER — TRAMADOL HYDROCHLORIDE 50 MG/1
TABLET ORAL
Qty: 180 TABLET | Refills: 0 | Status: SHIPPED | OUTPATIENT
Start: 2019-08-08 | End: 2019-09-16 | Stop reason: SDUPTHER

## 2019-08-08 RX ORDER — FENOFIBRATE 145 MG/1
145 TABLET, COATED ORAL DAILY
Qty: 90 TABLET | Refills: 1 | Status: SHIPPED | OUTPATIENT
Start: 2019-08-08 | End: 2020-02-12 | Stop reason: SDUPTHER

## 2019-08-08 RX ORDER — GABAPENTIN 300 MG/1
300 CAPSULE ORAL 2 TIMES DAILY
Qty: 180 CAPSULE | Refills: 0 | Status: SHIPPED | OUTPATIENT
Start: 2019-08-08 | End: 2020-01-15 | Stop reason: SDUPTHER

## 2019-08-08 RX ORDER — ARIPIPRAZOLE 5 MG/1
5 TABLET ORAL DAILY
Qty: 90 TABLET | Refills: 1 | Status: SHIPPED | OUTPATIENT
Start: 2019-08-08 | End: 2020-02-06 | Stop reason: SDUPTHER

## 2019-08-08 RX ORDER — TRAZODONE HYDROCHLORIDE 50 MG/1
100 TABLET ORAL
Qty: 180 TABLET | Refills: 1 | Status: SHIPPED | OUTPATIENT
Start: 2019-08-08 | End: 2020-02-12 | Stop reason: SDUPTHER

## 2019-08-08 RX ORDER — LORAZEPAM 0.5 MG/1
TABLET ORAL
Qty: 60 TABLET | Refills: 0 | Status: SHIPPED | OUTPATIENT
Start: 2019-08-08 | End: 2019-09-16 | Stop reason: SDUPTHER

## 2019-08-08 RX ORDER — LISINOPRIL 20 MG/1
20 TABLET ORAL DAILY
Qty: 90 TABLET | Refills: 1 | Status: SHIPPED | OUTPATIENT
Start: 2019-08-08 | End: 2020-02-06 | Stop reason: SDUPTHER

## 2019-08-08 NOTE — PATIENT INSTRUCTIONS
Obesity   AMBULATORY CARE:   Obesity  is when your body mass index (BMI) is greater than 30  Your healthcare provider will use your height and weight to measure your BMI  The risks of obesity include  many health problems, such as injuries or physical disability  You may need tests to check for the following:  · Diabetes     · High blood pressure or high cholesterol     · Heart disease     · Gallbladder or liver disease     · Cancer of the colon, breast, prostate, liver, or kidney     · Sleep apnea     · Arthritis or gout  Seek care immediately if:   · You have a severe headache, confusion, or difficulty speaking  · You have weakness on one side of your body  · You have chest pain, sweating, or shortness of breath  Contact your healthcare provider if:   · You have symptoms of gallbladder or liver disease, such as pain in your upper abdomen  · You have knee or hip pain and discomfort while walking  · You have symptoms of diabetes, such as intense hunger and thirst, and frequent urination  · You have symptoms of sleep apnea, such as snoring or daytime sleepiness  · You have questions or concerns about your condition or care  Treatment for obesity  focuses on helping you lose weight to improve your health  Even a small decrease in BMI can reduce the risk for many health problems  Your healthcare provider will help you set a weight-loss goal   · Lifestyle changes  are the first step in treating obesity  These include making healthy food choices and getting regular physical activity  Your healthcare provider may suggest a weight-loss program that involves coaching, education, and therapy  · Medicine  may help you lose weight when it is used with a healthy diet and physical activity  · Surgery  can help you lose weight if you are very obese and have other health problems  There are several types of weight-loss surgery  Ask your healthcare provider for more information    Be successful losing weight:   · Set small, realistic goals  An example of a small goal is to walk for 20 minutes 5 days a week  Anther goal is to lose 5% of your body weight  · Tell friends, family members, and coworkers about your goals  and ask for their support  Ask a friend to lose weight with you, or join a weight-loss support group  · Identify foods or triggers that may cause you to overeat , and find ways to avoid them  Remove tempting high-calorie foods from your home and workplace  Place a bowl of fresh fruit on your kitchen counter  If stress causes you to eat, then find other ways to cope with stress  · Keep a diary to track what you eat and drink  Also write down how many minutes of physical activity you do each day  Weigh yourself once a week and record it in your diary  Eating changes: You will need to eat 500 to 1,000 fewer calories each day than you currently eat to lose 1 to 2 pounds a week  The following changes will help you cut calories:  · Eat smaller portions  Use small plates, no larger than 9 inches in diameter  Fill your plate half full of fruits and vegetables  Measure your food using measuring cups until you know what a serving size looks like  · Eat 3 meals and 1 or 2 snacks each day  Plan your meals in advance  Maria Elena Vazquez and eat at home most of the time  Eat slowly  · Eat fruits and vegetables at every meal   They are low in calories and high in fiber, which makes you feel full  Do not add butter, margarine, or cream sauce to vegetables  Use herbs to season steamed vegetables  · Eat less fat and fewer fried foods  Eat more baked or grilled chicken and fish  These protein sources are lower in calories and fat than red meat  Limit fast food  Dress your salads with olive oil and vinegar instead of bottled dressing  · Limit the amount of sugar you eat  Do not drink sugary beverages  Limit alcohol  Activity changes:  Physical activity is good for your body in many ways   It helps you burn calories and build strong muscles  It decreases stress and depression, and improves your mood  It can also help you sleep better  Talk to your healthcare provider before you begin an exercise program   · Exercise for at least 30 minutes 5 days a week  Start slowly  Set aside time each day for physical activity that you enjoy and that is convenient for you  It is best to do both weight training and an activity that increases your heart rate, such as walking, bicycling, or swimming  · Find ways to be more active  Do yard work and housecleaning  Walk up the stairs instead of using elevators  Spend your leisure time going to events that require walking, such as outdoor festivals or fairs  This extra physical activity can help you lose weight and keep it off  Follow up with your healthcare provider as directed: You may need to meet with a dietitian  Write down your questions so you remember to ask them during your visits  © 2017 [employer] Information is for End User's use only and may not be sold, redistributed or otherwise used for commercial purposes  All illustrations and images included in CareNotes® are the copyrighted property of [employer]  or Joel Diaz  The above information is an  only  It is not intended as medical advice for individual conditions or treatments  Talk to your doctor, nurse or pharmacist before following any medical regimen to see if it is safe and effective for you

## 2019-08-30 DIAGNOSIS — F33.2 MAJOR DEPRESSIVE DISORDER, RECURRENT, SEVERE W/O PSYCHOTIC BEHAVIOR (HCC): ICD-10-CM

## 2019-08-30 RX ORDER — BUPROPION HYDROCHLORIDE 75 MG/1
TABLET ORAL
Qty: 60 TABLET | Refills: 1 | OUTPATIENT
Start: 2019-08-30

## 2019-09-16 DIAGNOSIS — F41.1 GENERALIZED ANXIETY DISORDER: ICD-10-CM

## 2019-09-16 DIAGNOSIS — F51.05 INSOMNIA DUE TO OTHER MENTAL DISORDER: ICD-10-CM

## 2019-09-16 DIAGNOSIS — F99 INSOMNIA DUE TO OTHER MENTAL DISORDER: ICD-10-CM

## 2019-09-16 DIAGNOSIS — M79.18 MYOFASCIAL PAIN SYNDROME: ICD-10-CM

## 2019-09-16 DIAGNOSIS — M51.26 BULGING LUMBAR DISC: ICD-10-CM

## 2019-09-16 RX ORDER — TRAMADOL HYDROCHLORIDE 50 MG/1
TABLET ORAL
Qty: 180 TABLET | Refills: 0 | Status: SHIPPED | OUTPATIENT
Start: 2019-09-16 | End: 2019-10-18 | Stop reason: SDUPTHER

## 2019-09-16 RX ORDER — LORAZEPAM 0.5 MG/1
TABLET ORAL
Qty: 60 TABLET | Refills: 0 | Status: SHIPPED | OUTPATIENT
Start: 2019-09-16 | End: 2019-10-18 | Stop reason: SDUPTHER

## 2019-09-26 DIAGNOSIS — F33.2 MAJOR DEPRESSIVE DISORDER, RECURRENT, SEVERE W/O PSYCHOTIC BEHAVIOR (HCC): ICD-10-CM

## 2019-09-26 RX ORDER — BUPROPION HYDROCHLORIDE 75 MG/1
TABLET ORAL
Qty: 60 TABLET | Refills: 1 | OUTPATIENT
Start: 2019-09-26

## 2019-10-09 DIAGNOSIS — F33.2 MAJOR DEPRESSIVE DISORDER, RECURRENT, SEVERE W/O PSYCHOTIC BEHAVIOR (HCC): ICD-10-CM

## 2019-10-09 RX ORDER — BUPROPION HYDROCHLORIDE 75 MG/1
75 TABLET ORAL 2 TIMES DAILY
Qty: 180 TABLET | Refills: 1 | Status: SHIPPED | OUTPATIENT
Start: 2019-10-09 | End: 2020-02-12 | Stop reason: SDUPTHER

## 2019-10-18 DIAGNOSIS — M79.18 MYOFASCIAL PAIN SYNDROME: ICD-10-CM

## 2019-10-18 DIAGNOSIS — M51.26 BULGING LUMBAR DISC: ICD-10-CM

## 2019-10-18 DIAGNOSIS — F99 INSOMNIA DUE TO OTHER MENTAL DISORDER: ICD-10-CM

## 2019-10-18 DIAGNOSIS — F41.1 GENERALIZED ANXIETY DISORDER: ICD-10-CM

## 2019-10-18 DIAGNOSIS — F51.05 INSOMNIA DUE TO OTHER MENTAL DISORDER: ICD-10-CM

## 2019-10-18 RX ORDER — LORAZEPAM 0.5 MG/1
TABLET ORAL
Qty: 60 TABLET | Refills: 0 | Status: SHIPPED | OUTPATIENT
Start: 2019-10-18 | End: 2019-11-20 | Stop reason: SDUPTHER

## 2019-10-18 RX ORDER — TRAMADOL HYDROCHLORIDE 50 MG/1
TABLET ORAL
Qty: 180 TABLET | Refills: 0 | Status: SHIPPED | OUTPATIENT
Start: 2019-10-18 | End: 2019-11-20 | Stop reason: SDUPTHER

## 2019-10-19 DIAGNOSIS — R20.0 NUMBNESS: ICD-10-CM

## 2019-10-21 RX ORDER — GABAPENTIN 300 MG/1
CAPSULE ORAL
Qty: 60 CAPSULE | Refills: 2 | OUTPATIENT
Start: 2019-10-21

## 2019-11-20 DIAGNOSIS — F51.05 INSOMNIA DUE TO OTHER MENTAL DISORDER: ICD-10-CM

## 2019-11-20 DIAGNOSIS — M79.18 MYOFASCIAL PAIN SYNDROME: ICD-10-CM

## 2019-11-20 DIAGNOSIS — M51.26 BULGING LUMBAR DISC: ICD-10-CM

## 2019-11-20 DIAGNOSIS — F41.1 GENERALIZED ANXIETY DISORDER: ICD-10-CM

## 2019-11-20 DIAGNOSIS — F99 INSOMNIA DUE TO OTHER MENTAL DISORDER: ICD-10-CM

## 2019-11-20 RX ORDER — LORAZEPAM 0.5 MG/1
TABLET ORAL
Qty: 60 TABLET | Refills: 0 | Status: SHIPPED | OUTPATIENT
Start: 2019-11-20 | End: 2019-12-16 | Stop reason: SDUPTHER

## 2019-11-20 RX ORDER — TRAMADOL HYDROCHLORIDE 50 MG/1
TABLET ORAL
Qty: 180 TABLET | Refills: 0 | Status: SHIPPED | OUTPATIENT
Start: 2019-11-20 | End: 2020-01-02 | Stop reason: SDUPTHER

## 2019-12-16 DIAGNOSIS — F51.05 INSOMNIA DUE TO OTHER MENTAL DISORDER: ICD-10-CM

## 2019-12-16 DIAGNOSIS — F41.1 GENERALIZED ANXIETY DISORDER: ICD-10-CM

## 2019-12-16 DIAGNOSIS — F99 INSOMNIA DUE TO OTHER MENTAL DISORDER: ICD-10-CM

## 2019-12-16 RX ORDER — LORAZEPAM 0.5 MG/1
TABLET ORAL
Qty: 60 TABLET | Refills: 0 | Status: SHIPPED | OUTPATIENT
Start: 2019-12-16 | End: 2020-01-15 | Stop reason: SDUPTHER

## 2020-01-02 DIAGNOSIS — M79.18 MYOFASCIAL PAIN SYNDROME: ICD-10-CM

## 2020-01-02 DIAGNOSIS — M51.26 BULGING LUMBAR DISC: ICD-10-CM

## 2020-01-02 RX ORDER — TRAMADOL HYDROCHLORIDE 50 MG/1
TABLET ORAL
Qty: 180 TABLET | Refills: 0 | Status: SHIPPED | OUTPATIENT
Start: 2020-01-02 | End: 2020-02-06 | Stop reason: SDUPTHER

## 2020-01-09 DIAGNOSIS — F51.01 PRIMARY INSOMNIA: ICD-10-CM

## 2020-01-09 DIAGNOSIS — E78.5 HYPERLIPIDEMIA, UNSPECIFIED HYPERLIPIDEMIA TYPE: ICD-10-CM

## 2020-01-09 DIAGNOSIS — F33.2 SEVERE EPISODE OF RECURRENT MAJOR DEPRESSIVE DISORDER, WITHOUT PSYCHOTIC FEATURES (HCC): ICD-10-CM

## 2020-01-09 DIAGNOSIS — I10 ESSENTIAL HYPERTENSION: ICD-10-CM

## 2020-01-09 RX ORDER — LISINOPRIL 20 MG/1
TABLET ORAL
Qty: 30 TABLET | Refills: 0 | OUTPATIENT
Start: 2020-01-09

## 2020-01-09 RX ORDER — ARIPIPRAZOLE 5 MG/1
TABLET ORAL
Qty: 30 TABLET | Refills: 0 | OUTPATIENT
Start: 2020-01-09

## 2020-01-09 RX ORDER — FENOFIBRATE 145 MG/1
TABLET, COATED ORAL
Qty: 30 TABLET | Refills: 0 | OUTPATIENT
Start: 2020-01-09

## 2020-01-09 RX ORDER — TRAZODONE HYDROCHLORIDE 50 MG/1
100 TABLET ORAL
Qty: 60 TABLET | Refills: 0 | OUTPATIENT
Start: 2020-01-09

## 2020-01-15 DIAGNOSIS — F51.05 INSOMNIA DUE TO OTHER MENTAL DISORDER: ICD-10-CM

## 2020-01-15 DIAGNOSIS — F41.1 GENERALIZED ANXIETY DISORDER: ICD-10-CM

## 2020-01-15 DIAGNOSIS — R20.0 NUMBNESS: ICD-10-CM

## 2020-01-15 DIAGNOSIS — F99 INSOMNIA DUE TO OTHER MENTAL DISORDER: ICD-10-CM

## 2020-01-15 RX ORDER — LORAZEPAM 0.5 MG/1
TABLET ORAL
Qty: 60 TABLET | Refills: 1 | Status: SHIPPED | OUTPATIENT
Start: 2020-01-15 | End: 2020-03-17 | Stop reason: SDUPTHER

## 2020-01-15 RX ORDER — GABAPENTIN 300 MG/1
300 CAPSULE ORAL 2 TIMES DAILY
Qty: 180 CAPSULE | Refills: 1 | Status: SHIPPED | OUTPATIENT
Start: 2020-01-15 | End: 2020-02-12 | Stop reason: SDUPTHER

## 2020-01-25 DIAGNOSIS — F33.2 SEVERE RECURRENT MAJOR DEPRESSION WITHOUT PSYCHOTIC FEATURES (HCC): ICD-10-CM

## 2020-01-27 RX ORDER — ESCITALOPRAM OXALATE 10 MG/1
TABLET ORAL
Qty: 30 TABLET | Refills: 0 | OUTPATIENT
Start: 2020-01-27

## 2020-02-06 DIAGNOSIS — F33.2 SEVERE EPISODE OF RECURRENT MAJOR DEPRESSIVE DISORDER, WITHOUT PSYCHOTIC FEATURES (HCC): ICD-10-CM

## 2020-02-06 DIAGNOSIS — I10 ESSENTIAL HYPERTENSION: ICD-10-CM

## 2020-02-06 DIAGNOSIS — M79.18 MYOFASCIAL PAIN SYNDROME: ICD-10-CM

## 2020-02-06 DIAGNOSIS — M51.26 BULGING LUMBAR DISC: ICD-10-CM

## 2020-02-06 RX ORDER — TRAMADOL HYDROCHLORIDE 50 MG/1
TABLET ORAL
Qty: 180 TABLET | Refills: 0 | Status: SHIPPED | OUTPATIENT
Start: 2020-02-06 | End: 2020-03-09 | Stop reason: SDUPTHER

## 2020-02-06 RX ORDER — LISINOPRIL 20 MG/1
20 TABLET ORAL DAILY
Qty: 90 TABLET | Refills: 1 | Status: SHIPPED | OUTPATIENT
Start: 2020-02-06 | End: 2020-02-12 | Stop reason: SDUPTHER

## 2020-02-06 RX ORDER — ARIPIPRAZOLE 5 MG/1
5 TABLET ORAL DAILY
Qty: 90 TABLET | Refills: 1 | Status: SHIPPED | OUTPATIENT
Start: 2020-02-06 | End: 2020-02-12 | Stop reason: SDUPTHER

## 2020-02-12 ENCOUNTER — OFFICE VISIT (OUTPATIENT)
Dept: FAMILY MEDICINE CLINIC | Facility: CLINIC | Age: 45
End: 2020-02-12
Payer: COMMERCIAL

## 2020-02-12 VITALS
SYSTOLIC BLOOD PRESSURE: 126 MMHG | OXYGEN SATURATION: 95 % | TEMPERATURE: 97.3 F | BODY MASS INDEX: 33.6 KG/M2 | HEIGHT: 78 IN | HEART RATE: 93 BPM | RESPIRATION RATE: 20 BRPM | WEIGHT: 290.4 LBS | DIASTOLIC BLOOD PRESSURE: 80 MMHG

## 2020-02-12 DIAGNOSIS — M79.18 MYOFASCIAL PAIN SYNDROME: ICD-10-CM

## 2020-02-12 DIAGNOSIS — Z11.4 SCREENING FOR HIV (HUMAN IMMUNODEFICIENCY VIRUS): ICD-10-CM

## 2020-02-12 DIAGNOSIS — F99 INSOMNIA DUE TO OTHER MENTAL DISORDER: ICD-10-CM

## 2020-02-12 DIAGNOSIS — F51.05 INSOMNIA DUE TO OTHER MENTAL DISORDER: ICD-10-CM

## 2020-02-12 DIAGNOSIS — I10 ESSENTIAL HYPERTENSION: ICD-10-CM

## 2020-02-12 DIAGNOSIS — F41.1 GENERALIZED ANXIETY DISORDER: ICD-10-CM

## 2020-02-12 DIAGNOSIS — E78.5 HYPERLIPIDEMIA, UNSPECIFIED HYPERLIPIDEMIA TYPE: ICD-10-CM

## 2020-02-12 DIAGNOSIS — Z79.899 HIGH RISK MEDICATION USE: ICD-10-CM

## 2020-02-12 DIAGNOSIS — M51.26 BULGING LUMBAR DISC: ICD-10-CM

## 2020-02-12 DIAGNOSIS — Z12.5 SCREENING FOR PROSTATE CANCER: ICD-10-CM

## 2020-02-12 DIAGNOSIS — F33.2 SEVERE RECURRENT MAJOR DEPRESSION WITHOUT PSYCHOTIC FEATURES (HCC): Primary | ICD-10-CM

## 2020-02-12 DIAGNOSIS — R20.0 NUMBNESS: ICD-10-CM

## 2020-02-12 PROCEDURE — 3074F SYST BP LT 130 MM HG: CPT | Performed by: FAMILY MEDICINE

## 2020-02-12 PROCEDURE — 1036F TOBACCO NON-USER: CPT | Performed by: FAMILY MEDICINE

## 2020-02-12 PROCEDURE — 3008F BODY MASS INDEX DOCD: CPT | Performed by: FAMILY MEDICINE

## 2020-02-12 PROCEDURE — 3079F DIAST BP 80-89 MM HG: CPT | Performed by: FAMILY MEDICINE

## 2020-02-12 PROCEDURE — 99214 OFFICE O/P EST MOD 30 MIN: CPT | Performed by: FAMILY MEDICINE

## 2020-02-12 RX ORDER — LORAZEPAM 0.5 MG/1
TABLET ORAL
Qty: 60 TABLET | Refills: 1 | Status: CANCELLED | OUTPATIENT
Start: 2020-02-12

## 2020-02-12 RX ORDER — TRAMADOL HYDROCHLORIDE 50 MG/1
TABLET ORAL
Qty: 180 TABLET | Refills: 0 | Status: CANCELLED | OUTPATIENT
Start: 2020-02-12

## 2020-02-12 RX ORDER — ESCITALOPRAM OXALATE 10 MG/1
10 TABLET ORAL DAILY
Qty: 90 TABLET | Refills: 1 | Status: SHIPPED | OUTPATIENT
Start: 2020-02-12 | End: 2020-08-20 | Stop reason: SDUPTHER

## 2020-02-12 RX ORDER — GABAPENTIN 300 MG/1
300 CAPSULE ORAL 2 TIMES DAILY
Qty: 180 CAPSULE | Refills: 1 | Status: SHIPPED | OUTPATIENT
Start: 2020-02-12 | End: 2020-06-29 | Stop reason: SDUPTHER

## 2020-02-12 RX ORDER — ARIPIPRAZOLE 5 MG/1
5 TABLET ORAL DAILY
Qty: 90 TABLET | Refills: 1 | Status: SHIPPED | OUTPATIENT
Start: 2020-02-12 | End: 2020-06-02 | Stop reason: SDUPTHER

## 2020-02-12 RX ORDER — BUPROPION HYDROCHLORIDE 75 MG/1
75 TABLET ORAL 2 TIMES DAILY
Qty: 180 TABLET | Refills: 1 | Status: SHIPPED | OUTPATIENT
Start: 2020-02-12 | End: 2020-06-02 | Stop reason: SDUPTHER

## 2020-02-12 RX ORDER — TRAZODONE HYDROCHLORIDE 50 MG/1
100 TABLET ORAL
Qty: 180 TABLET | Refills: 1 | Status: SHIPPED | OUTPATIENT
Start: 2020-02-12 | End: 2020-05-15 | Stop reason: SDUPTHER

## 2020-02-12 RX ORDER — LISINOPRIL 20 MG/1
20 TABLET ORAL DAILY
Qty: 90 TABLET | Refills: 1 | Status: SHIPPED | OUTPATIENT
Start: 2020-02-12 | End: 2020-06-02 | Stop reason: SDUPTHER

## 2020-02-12 RX ORDER — FENOFIBRATE 145 MG/1
145 TABLET, COATED ORAL DAILY
Qty: 90 TABLET | Refills: 1 | Status: SHIPPED | OUTPATIENT
Start: 2020-02-12 | End: 2020-03-23 | Stop reason: SDUPTHER

## 2020-03-09 DIAGNOSIS — M79.18 MYOFASCIAL PAIN SYNDROME: ICD-10-CM

## 2020-03-09 DIAGNOSIS — M51.26 BULGING LUMBAR DISC: ICD-10-CM

## 2020-03-09 RX ORDER — TRAMADOL HYDROCHLORIDE 50 MG/1
TABLET ORAL
Qty: 180 TABLET | Refills: 0 | Status: SHIPPED | OUTPATIENT
Start: 2020-03-09 | End: 2020-04-14 | Stop reason: SDUPTHER

## 2020-03-17 DIAGNOSIS — F99 INSOMNIA DUE TO OTHER MENTAL DISORDER: ICD-10-CM

## 2020-03-17 DIAGNOSIS — F51.05 INSOMNIA DUE TO OTHER MENTAL DISORDER: ICD-10-CM

## 2020-03-17 DIAGNOSIS — F41.1 GENERALIZED ANXIETY DISORDER: ICD-10-CM

## 2020-03-17 RX ORDER — LORAZEPAM 0.5 MG/1
TABLET ORAL
Qty: 60 TABLET | Refills: 0 | Status: SHIPPED | OUTPATIENT
Start: 2020-03-17 | End: 2020-04-17 | Stop reason: SDUPTHER

## 2020-03-23 DIAGNOSIS — E78.5 HYPERLIPIDEMIA, UNSPECIFIED HYPERLIPIDEMIA TYPE: ICD-10-CM

## 2020-03-23 RX ORDER — FENOFIBRATE 145 MG/1
145 TABLET, COATED ORAL DAILY
Qty: 90 TABLET | Refills: 1 | Status: SHIPPED | OUTPATIENT
Start: 2020-03-23 | End: 2020-06-29 | Stop reason: SDUPTHER

## 2020-03-23 RX ORDER — FENOFIBRATE 145 MG/1
145 TABLET, COATED ORAL DAILY
Qty: 90 TABLET | Refills: 1 | Status: SHIPPED | OUTPATIENT
Start: 2020-03-23 | End: 2020-03-23 | Stop reason: SDUPTHER

## 2020-04-14 DIAGNOSIS — M51.26 BULGING LUMBAR DISC: ICD-10-CM

## 2020-04-14 DIAGNOSIS — M79.18 MYOFASCIAL PAIN SYNDROME: ICD-10-CM

## 2020-04-14 RX ORDER — TRAMADOL HYDROCHLORIDE 50 MG/1
TABLET ORAL
Qty: 180 TABLET | Refills: 0 | Status: SHIPPED | OUTPATIENT
Start: 2020-04-14 | End: 2020-05-15 | Stop reason: SDUPTHER

## 2020-04-17 DIAGNOSIS — F99 INSOMNIA DUE TO OTHER MENTAL DISORDER: ICD-10-CM

## 2020-04-17 DIAGNOSIS — F41.1 GENERALIZED ANXIETY DISORDER: ICD-10-CM

## 2020-04-17 DIAGNOSIS — F51.05 INSOMNIA DUE TO OTHER MENTAL DISORDER: ICD-10-CM

## 2020-04-17 RX ORDER — LORAZEPAM 0.5 MG/1
TABLET ORAL
Qty: 60 TABLET | Refills: 0 | Status: SHIPPED | OUTPATIENT
Start: 2020-04-17 | End: 2020-05-15 | Stop reason: SDUPTHER

## 2020-05-15 ENCOUNTER — TELEPHONE (OUTPATIENT)
Dept: FAMILY MEDICINE CLINIC | Facility: CLINIC | Age: 45
End: 2020-05-15

## 2020-05-15 DIAGNOSIS — F41.1 GENERALIZED ANXIETY DISORDER: ICD-10-CM

## 2020-05-15 DIAGNOSIS — M51.26 BULGING LUMBAR DISC: ICD-10-CM

## 2020-05-15 DIAGNOSIS — F99 INSOMNIA DUE TO OTHER MENTAL DISORDER: ICD-10-CM

## 2020-05-15 DIAGNOSIS — F51.05 INSOMNIA DUE TO OTHER MENTAL DISORDER: ICD-10-CM

## 2020-05-15 DIAGNOSIS — M79.18 MYOFASCIAL PAIN SYNDROME: ICD-10-CM

## 2020-05-15 RX ORDER — TRAMADOL HYDROCHLORIDE 50 MG/1
TABLET ORAL
Qty: 180 TABLET | Refills: 0 | Status: SHIPPED | OUTPATIENT
Start: 2020-05-15 | End: 2020-07-27 | Stop reason: SDUPTHER

## 2020-05-15 RX ORDER — TRAZODONE HYDROCHLORIDE 50 MG/1
100 TABLET ORAL
Qty: 180 TABLET | Refills: 1 | Status: SHIPPED | OUTPATIENT
Start: 2020-05-15 | End: 2020-05-15 | Stop reason: SDUPTHER

## 2020-05-15 RX ORDER — LORAZEPAM 0.5 MG/1
TABLET ORAL
Qty: 60 TABLET | Refills: 0 | Status: SHIPPED | OUTPATIENT
Start: 2020-05-15 | End: 2020-06-16 | Stop reason: SDUPTHER

## 2020-05-15 RX ORDER — TRAZODONE HYDROCHLORIDE 50 MG/1
100 TABLET ORAL
Qty: 180 TABLET | Refills: 1 | Status: SHIPPED | OUTPATIENT
Start: 2020-05-15 | End: 2020-09-16 | Stop reason: SDUPTHER

## 2020-05-31 DIAGNOSIS — R20.0 NUMBNESS: ICD-10-CM

## 2020-06-01 RX ORDER — GABAPENTIN 300 MG/1
CAPSULE ORAL
Qty: 60 CAPSULE | Refills: 5 | OUTPATIENT
Start: 2020-06-01

## 2020-06-02 DIAGNOSIS — F33.2 SEVERE RECURRENT MAJOR DEPRESSION WITHOUT PSYCHOTIC FEATURES (HCC): ICD-10-CM

## 2020-06-02 DIAGNOSIS — I10 ESSENTIAL HYPERTENSION: ICD-10-CM

## 2020-06-02 RX ORDER — LISINOPRIL 20 MG/1
20 TABLET ORAL DAILY
Qty: 90 TABLET | Refills: 1 | Status: SHIPPED | OUTPATIENT
Start: 2020-06-02 | End: 2020-06-03 | Stop reason: SDUPTHER

## 2020-06-02 RX ORDER — ARIPIPRAZOLE 5 MG/1
5 TABLET ORAL DAILY
Qty: 90 TABLET | Refills: 1 | Status: SHIPPED | OUTPATIENT
Start: 2020-06-02 | End: 2020-06-03 | Stop reason: SDUPTHER

## 2020-06-02 RX ORDER — BUPROPION HYDROCHLORIDE 75 MG/1
75 TABLET ORAL 2 TIMES DAILY
Qty: 180 TABLET | Refills: 1 | Status: SHIPPED | OUTPATIENT
Start: 2020-06-02 | End: 2020-06-03 | Stop reason: SDUPTHER

## 2020-06-03 DIAGNOSIS — F33.2 SEVERE RECURRENT MAJOR DEPRESSION WITHOUT PSYCHOTIC FEATURES (HCC): ICD-10-CM

## 2020-06-03 DIAGNOSIS — I10 ESSENTIAL HYPERTENSION: ICD-10-CM

## 2020-06-03 RX ORDER — LISINOPRIL 20 MG/1
20 TABLET ORAL DAILY
Qty: 90 TABLET | Refills: 1 | Status: SHIPPED | OUTPATIENT
Start: 2020-06-03 | End: 2020-12-02 | Stop reason: SDUPTHER

## 2020-06-03 RX ORDER — ARIPIPRAZOLE 5 MG/1
5 TABLET ORAL DAILY
Qty: 90 TABLET | Refills: 1 | Status: SHIPPED | OUTPATIENT
Start: 2020-06-03 | End: 2020-11-19 | Stop reason: SDUPTHER

## 2020-06-03 RX ORDER — BUPROPION HYDROCHLORIDE 75 MG/1
75 TABLET ORAL 2 TIMES DAILY
Qty: 180 TABLET | Refills: 1 | Status: SHIPPED | OUTPATIENT
Start: 2020-06-03 | End: 2020-12-01 | Stop reason: SDUPTHER

## 2020-06-16 DIAGNOSIS — F99 INSOMNIA DUE TO OTHER MENTAL DISORDER: ICD-10-CM

## 2020-06-16 DIAGNOSIS — F51.05 INSOMNIA DUE TO OTHER MENTAL DISORDER: ICD-10-CM

## 2020-06-16 DIAGNOSIS — F41.1 GENERALIZED ANXIETY DISORDER: ICD-10-CM

## 2020-06-16 RX ORDER — LORAZEPAM 0.5 MG/1
TABLET ORAL
Qty: 60 TABLET | Refills: 0 | Status: SHIPPED | OUTPATIENT
Start: 2020-06-16 | End: 2020-07-20 | Stop reason: SDUPTHER

## 2020-06-29 DIAGNOSIS — R20.0 NUMBNESS: ICD-10-CM

## 2020-06-29 DIAGNOSIS — E78.5 HYPERLIPIDEMIA, UNSPECIFIED HYPERLIPIDEMIA TYPE: ICD-10-CM

## 2020-06-29 RX ORDER — FENOFIBRATE 145 MG/1
145 TABLET, COATED ORAL DAILY
Qty: 90 TABLET | Refills: 1 | Status: SHIPPED | OUTPATIENT
Start: 2020-06-29 | End: 2020-12-21 | Stop reason: SDUPTHER

## 2020-06-29 RX ORDER — GABAPENTIN 300 MG/1
300 CAPSULE ORAL 2 TIMES DAILY
Qty: 180 CAPSULE | Refills: 1 | Status: SHIPPED | OUTPATIENT
Start: 2020-06-29 | End: 2020-06-29 | Stop reason: SDUPTHER

## 2020-06-29 RX ORDER — GABAPENTIN 300 MG/1
300 CAPSULE ORAL 2 TIMES DAILY
Qty: 180 CAPSULE | Refills: 1 | Status: SHIPPED | OUTPATIENT
Start: 2020-06-29 | End: 2020-12-01 | Stop reason: SDUPTHER

## 2020-07-20 DIAGNOSIS — F51.05 INSOMNIA DUE TO OTHER MENTAL DISORDER: ICD-10-CM

## 2020-07-20 DIAGNOSIS — F41.1 GENERALIZED ANXIETY DISORDER: ICD-10-CM

## 2020-07-20 DIAGNOSIS — F99 INSOMNIA DUE TO OTHER MENTAL DISORDER: ICD-10-CM

## 2020-07-20 RX ORDER — LORAZEPAM 0.5 MG/1
TABLET ORAL
Qty: 60 TABLET | Refills: 0 | Status: SHIPPED | OUTPATIENT
Start: 2020-07-20 | End: 2020-08-20 | Stop reason: SDUPTHER

## 2020-07-27 DIAGNOSIS — M79.18 MYOFASCIAL PAIN SYNDROME: ICD-10-CM

## 2020-07-27 DIAGNOSIS — M51.26 BULGING LUMBAR DISC: ICD-10-CM

## 2020-07-27 RX ORDER — TRAMADOL HYDROCHLORIDE 50 MG/1
TABLET ORAL
Qty: 180 TABLET | Refills: 0 | Status: SHIPPED | OUTPATIENT
Start: 2020-07-27 | End: 2020-09-16 | Stop reason: SDUPTHER

## 2020-08-13 ENCOUNTER — TELEMEDICINE (OUTPATIENT)
Dept: FAMILY MEDICINE CLINIC | Facility: CLINIC | Age: 45
End: 2020-08-13
Payer: COMMERCIAL

## 2020-08-13 VITALS — HEIGHT: 78 IN | WEIGHT: 290 LBS | BODY MASS INDEX: 33.55 KG/M2

## 2020-08-13 DIAGNOSIS — Z00.00 ENCOUNTER FOR WELL ADULT EXAM WITHOUT ABNORMAL FINDINGS: Primary | ICD-10-CM

## 2020-08-13 PROCEDURE — 99396 PREV VISIT EST AGE 40-64: CPT | Performed by: FAMILY MEDICINE

## 2020-08-13 PROCEDURE — 3008F BODY MASS INDEX DOCD: CPT | Performed by: FAMILY MEDICINE

## 2020-08-13 NOTE — PATIENT INSTRUCTIONS
Obesity   AMBULATORY CARE:   Obesity  is when your body mass index (BMI) is greater than 30  Your healthcare provider will use your height and weight to measure your BMI  The risks of obesity include  many health problems, such as injuries or physical disability  You may need tests to check for the following:  · Diabetes     · High blood pressure or high cholesterol     · Heart disease     · Gallbladder or liver disease     · Cancer of the colon, breast, prostate, liver, or kidney     · Sleep apnea     · Arthritis or gout  Seek care immediately if:   · You have a severe headache, confusion, or difficulty speaking  · You have weakness on one side of your body  · You have chest pain, sweating, or shortness of breath  Contact your healthcare provider if:   · You have symptoms of gallbladder or liver disease, such as pain in your upper abdomen  · You have knee or hip pain and discomfort while walking  · You have symptoms of diabetes, such as intense hunger and thirst, and frequent urination  · You have symptoms of sleep apnea, such as snoring or daytime sleepiness  · You have questions or concerns about your condition or care  Treatment for obesity  focuses on helping you lose weight to improve your health  Even a small decrease in BMI can reduce the risk for many health problems  Your healthcare provider will help you set a weight-loss goal   · Lifestyle changes  are the first step in treating obesity  These include making healthy food choices and getting regular physical activity  Your healthcare provider may suggest a weight-loss program that involves coaching, education, and therapy  · Medicine  may help you lose weight when it is used with a healthy diet and physical activity  · Surgery  can help you lose weight if you are very obese and have other health problems  There are several types of weight-loss surgery  Ask your healthcare provider for more information    Be successful losing weight:   · Set small, realistic goals  An example of a small goal is to walk for 20 minutes 5 days a week  Anther goal is to lose 5% of your body weight  · Tell friends, family members, and coworkers about your goals  and ask for their support  Ask a friend to lose weight with you, or join a weight-loss support group  · Identify foods or triggers that may cause you to overeat , and find ways to avoid them  Remove tempting high-calorie foods from your home and workplace  Place a bowl of fresh fruit on your kitchen counter  If stress causes you to eat, then find other ways to cope with stress  · Keep a diary to track what you eat and drink  Also write down how many minutes of physical activity you do each day  Weigh yourself once a week and record it in your diary  Eating changes: You will need to eat 500 to 1,000 fewer calories each day than you currently eat to lose 1 to 2 pounds a week  The following changes will help you cut calories:  · Eat smaller portions  Use small plates, no larger than 9 inches in diameter  Fill your plate half full of fruits and vegetables  Measure your food using measuring cups until you know what a serving size looks like  · Eat 3 meals and 1 or 2 snacks each day  Plan your meals in advance  Aggie Herita and eat at home most of the time  Eat slowly  · Eat fruits and vegetables at every meal   They are low in calories and high in fiber, which makes you feel full  Do not add butter, margarine, or cream sauce to vegetables  Use herbs to season steamed vegetables  · Eat less fat and fewer fried foods  Eat more baked or grilled chicken and fish  These protein sources are lower in calories and fat than red meat  Limit fast food  Dress your salads with olive oil and vinegar instead of bottled dressing  · Limit the amount of sugar you eat  Do not drink sugary beverages  Limit alcohol  Activity changes:  Physical activity is good for your body in many ways   It helps you burn calories and build strong muscles  It decreases stress and depression, and improves your mood  It can also help you sleep better  Talk to your healthcare provider before you begin an exercise program   · Exercise for at least 30 minutes 5 days a week  Start slowly  Set aside time each day for physical activity that you enjoy and that is convenient for you  It is best to do both weight training and an activity that increases your heart rate, such as walking, bicycling, or swimming  · Find ways to be more active  Do yard work and housecleaning  Walk up the stairs instead of using elevators  Spend your leisure time going to events that require walking, such as outdoor festivals or fairs  This extra physical activity can help you lose weight and keep it off  Follow up with your healthcare provider as directed: You may need to meet with a dietitian  Write down your questions so you remember to ask them during your visits  © 2017 [employer] Information is for End User's use only and may not be sold, redistributed or otherwise used for commercial purposes  All illustrations and images included in CareNotes® are the copyrighted property of [employer]  or Joel Diaz  The above information is an  only  It is not intended as medical advice for individual conditions or treatments  Talk to your doctor, nurse or pharmacist before following any medical regimen to see if it is safe and effective for you  Weight Management   AMBULATORY CARE:   Why it is important to manage your weight:  Being overweight increases your risk of health conditions such as heart disease, high blood pressure, type 2 diabetes, and certain types of cancer  It can also increase your risk for osteoarthritis, sleep apnea, and other respiratory problems  Aim for a slow, steady weight loss  Even a small amount of weight loss can lower your risk of health problems    How to lose weight safely:  A safe and healthy way to lose weight is to eat fewer calories and get regular exercise  You can lose up about 1 pound a week by decreasing the number of calories you eat by 500 calories each day  You can decrease calories by eating smaller portion sizes or by cutting out high-calorie foods  Read labels to find out how many calories are in the foods you eat  You can also burn calories with exercise such as walking, swimming, or biking  You will be more likely to keep weight off if you make these changes part of your lifestyle  Healthy meal plan for weight management:  A healthy meal plan includes a variety of foods, contains fewer calories, and helps you stay healthy  A healthy meal plan includes the following:  · Eat whole-grain foods more often  A healthy meal plan should contain fiber  Fiber is the part of grains, fruits, and vegetables that is not broken down by your body  Whole-grain foods are healthy and provide extra fiber in your diet  Some examples of whole-grain foods are whole-wheat breads and pastas, oatmeal, brown rice, and bulgur  · Eat a variety of vegetables every day  Include dark, leafy greens such as spinach, kale, martin greens, and mustard greens  Eat yellow and orange vegetables such as carrots, sweet potatoes, and winter squash  · Eat a variety of fruits every day  Choose fresh or canned fruit (canned in its own juice or light syrup) instead of juice  Fruit juice has very little or no fiber  · Eat low-fat dairy foods  Drink fat-free (skim) milk or 1% milk  Eat fat-free yogurt and low-fat cottage cheese  Try low-fat cheeses such as mozzarella and other reduced-fat cheeses  · Choose meat and other protein foods that are low in fat  Choose beans or other legumes such as split peas or lentils  Choose fish, skinless poultry (chicken or turkey), or lean cuts of red meat (beef or pork)  Before you cook meat or poultry, cut off any visible fat  · Use less fat and oil  Try baking foods instead of frying them  Add less fat, such as margarine, sour cream, regular salad dressing and mayonnaise to foods  Eat fewer high-fat foods  Some examples of high-fat foods include french fries, doughnuts, ice cream, and cakes  · Eat fewer sweets  Limit foods and drinks that are high in sugar  This includes candy, cookies, regular soda, and sweetened drinks  Ways to decrease calories:   · Eat smaller portions  ¨ Use a small plate with smaller servings  ¨ Do not eat second helpings  ¨ When you eat at a restaurant, ask for a box and place half of your meal in the box before you eat  ¨ Share an entrée with someone else  · Replace high-calorie snacks with healthy, low-calorie snacks  ¨ Choose fresh fruit, vegetables, fat-free rice cakes, or air-popped popcorn instead of potato chips, nuts, or chocolate  ¨ Choose water or calorie-free drinks instead of soda or sweetened drinks  · Eat regular meals  Skipping meals can lead to overeating later in the day  Eat a healthy snack in place of a meal if you do not have time to eat a regular meal      · Do not shop for groceries when you are hungry  You may be more likely to make unhealthy food choices  Take a grocery list of healthy foods and shop after you have eaten  Exercise:  Exercise at least 30 minutes per day on most days of the week  Some examples of exercise include walking, biking, dancing, and swimming  You can also fit in more physical activity by taking the stairs instead of the elevator or parking farther away from stores  Ask your healthcare provider about the best exercise plan for you  Other things to consider as you try to lose weight:   · Be aware of situations that may give you the urge to overeat, such as eating while watching television  Find ways to avoid these situations  For example, read a book, go for a walk, or do crafts      · Meet with a weight loss support group or friends who are also trying to lose weight  This may help you stay motivated to continue working on your weight loss goals  © 2017 [employer] Information is for End User's use only and may not be sold, redistributed or otherwise used for commercial purposes  All illustrations and images included in CareNotes® are the copyrighted property of [email or web address]  or Joel Diaz  The above information is an  only  It is not intended as medical advice for individual conditions or treatments  Talk to your doctor, nurse or pharmacist before following any medical regimen to see if it is safe and effective for you

## 2020-08-13 NOTE — PROGRESS NOTES
Virtual Regular Visit      Assessment/Plan:    Problem List Items Addressed This Visit     None      Visit Diagnoses     Encounter for well adult exam without abnormal findings    -  Primary    BMI 33 0-33 9,adult              BMI Counseling: Body mass index is 33 51 kg/m²  The BMI is above normal  Nutrition recommendations include decreasing portion sizes, encouraging healthy choices of fruits and vegetables, decreasing fast food intake, consuming healthier snacks, limiting drinks that contain sugar, moderation in carbohydrate intake, increasing intake of lean protein, reducing intake of saturated and trans fat and reducing intake of cholesterol  Exercise recommendations include exercising 3-5 times per week  Overall patient is doing well  His meds are up-to-date  They are working well with no major issues  He still did not get blood work done from last year but he says he will try to get that done soon  He will call for refills  He can follow up in 6 months or sooner if needed    Reason for visit is   Chief Complaint   Patient presents with    Virtual Regular Visit        Encounter provider Aixa Mariee DO    Provider located at [address] [place] [id number] [phone]      Recent Visits  No visits were found meeting these conditions  Showing recent visits within past 7 days and meeting all other requirements     Today's Visits  Date Type Provider Dept   08/13/20 Telemedicine DO Sy Durbin   Showing today's visits and meeting all other requirements     Future Appointments  No visits were found meeting these conditions  Showing future appointments within next 150 days and meeting all other requirements        The patient was identified by name and date of birth   Shelly Oh was informed that this is a telemedicine visit and that the visit is being conducted through [facility] and patient was informed that this is a secure, HIPAA-compliant platform  He agrees to proceed     My office door was closed  No one else was in the room  He acknowledged consent and understanding of privacy and security of the video platform  The patient has agreed to participate and understands they can discontinue the visit at any time  Patient is aware this is a billable service  Eusebia Barbosa is a 50 y o  male presents today for well visit   Patient is here for med check  He reports feeling well  His meds are all working well he has no complaints or side effects  Overall his other chronic medical conditions have been stable  Does not currently need refills  He has no acute physical complaints today       Past Medical History:   Diagnosis Date    Actinic keratosis     last assessed 10/14/13    Herniated nucleus pulposus, L5-S1, right     last assessed 1/21/13       History reviewed  No pertinent surgical history      Current Outpatient Medications   Medication Sig Dispense Refill    ARIPiprazole (ABILIFY) 5 mg tablet Take 1 tablet (5 mg total) by mouth daily Take 1 tablet by mouth daily 90 tablet 1    buPROPion (WELLBUTRIN) 75 mg tablet Take 1 tablet (75 mg total) by mouth 2 (two) times a day 180 tablet 1    escitalopram (LEXAPRO) 10 mg tablet Take 1 tablet (10 mg total) by mouth daily 90 tablet 1    fenofibrate (TRICOR) 145 mg tablet Take 1 tablet (145 mg total) by mouth daily 90 tablet 1    gabapentin (NEURONTIN) 300 mg capsule Take 1 capsule (300 mg total) by mouth 2 (two) times a day 180 capsule 1    lisinopril (ZESTRIL) 20 mg tablet Take 1 tablet (20 mg total) by mouth daily Take 1 tablet by mouth daily 90 tablet 1    LORazepam (ATIVAN) 0 5 mg tablet 1-2 tabs at bedtime as directed 60 tablet 0    traMADol (ULTRAM) 50 mg tablet 2 tablets every 4 hours 180 tablet 0    traZODone (DESYREL) 50 mg tablet Take 2 tablets (100 mg total) by mouth daily at bedtime 180 tablet 1     No current facility-administered medications for this visit  No Known Allergies    Review of Systems   Constitutional: Negative  HENT: Negative  Eyes: Negative  Respiratory: Negative  Cardiovascular: Negative  Gastrointestinal: Negative  Endocrine: Negative  Genitourinary: Negative  Musculoskeletal: Negative  Skin: Negative  Allergic/Immunologic: Negative  Neurological: Negative  Hematological: Negative  Psychiatric/Behavioral: Negative  All other systems reviewed and are negative  Video Exam    Vitals:    08/13/20 0827   Weight: 132 kg (290 lb)   Height: 6' 6" (1 981 m)       Physical Exam  Vitals signs and nursing note reviewed  Constitutional:       General: He is not in acute distress  Appearance: He is well-developed  HENT:      Head: Normocephalic  Right Ear: External ear normal       Left Ear: External ear normal       Nose: Nose normal    Eyes:      General:         Right eye: No discharge  Left eye: No discharge  Conjunctiva/sclera: Conjunctivae normal       Pupils: Pupils are equal, round, and reactive to light  Neck:      Musculoskeletal: Normal range of motion  Cardiovascular:      Rate and Rhythm: Normal rate and regular rhythm  Heart sounds: Normal heart sounds  Pulmonary:      Effort: Pulmonary effort is normal       Breath sounds: Normal breath sounds  Abdominal:      General: Bowel sounds are normal  There is no distension  Palpations: Abdomen is soft  Tenderness: There is no abdominal tenderness  Musculoskeletal: Normal range of motion  Skin:     General: Skin is warm and dry  Findings: No rash  Neurological:      Mental Status: He is alert and oriented to person, place, and time  Cranial Nerves: No cranial nerve deficit  Psychiatric:         Behavior: Behavior normal          Thought Content:  Thought content normal          Judgment: Judgment normal           I spent 15 minutes directly with the patient during this visit      VIRTUAL VISIT DISCLAIMER    Yamilex Mcintyre acknowledges that he has consented to an online visit or consultation  He understands that the online visit is based solely on information provided by him, and that, in the absence of a face-to-face physical evaluation by the physician, the diagnosis he receives is both limited and provisional in terms of accuracy and completeness  This is not intended to replace a full medical face-to-face evaluation by the physician  Yamilex Mcintyre understands and accepts these terms  BMI Counseling: Body mass index is 33 51 kg/m²  The BMI is above normal  Nutrition recommendations include reducing portion sizes, decreasing overall calorie intake, 3-5 servings of fruits/vegetables daily, reducing fast food intake, consuming healthier snacks, decreasing soda and/or juice intake, moderation in carbohydrate intake, increasing intake of lean protein, reducing intake of saturated fat and trans fat and reducing intake of cholesterol  Exercise recommendations include exercising 3-5 times per week

## 2020-08-20 DIAGNOSIS — F41.1 GENERALIZED ANXIETY DISORDER: ICD-10-CM

## 2020-08-20 DIAGNOSIS — F99 INSOMNIA DUE TO OTHER MENTAL DISORDER: ICD-10-CM

## 2020-08-20 DIAGNOSIS — F33.2 SEVERE RECURRENT MAJOR DEPRESSION WITHOUT PSYCHOTIC FEATURES (HCC): ICD-10-CM

## 2020-08-20 DIAGNOSIS — F51.05 INSOMNIA DUE TO OTHER MENTAL DISORDER: ICD-10-CM

## 2020-08-20 RX ORDER — ESCITALOPRAM OXALATE 10 MG/1
10 TABLET ORAL DAILY
Qty: 90 TABLET | Refills: 1 | Status: SHIPPED | OUTPATIENT
Start: 2020-08-20 | End: 2021-02-16 | Stop reason: SDUPTHER

## 2020-08-20 RX ORDER — LORAZEPAM 0.5 MG/1
TABLET ORAL
Qty: 60 TABLET | Refills: 0 | Status: SHIPPED | OUTPATIENT
Start: 2020-08-20 | End: 2020-09-16 | Stop reason: SDUPTHER

## 2020-09-16 DIAGNOSIS — M51.26 BULGING LUMBAR DISC: ICD-10-CM

## 2020-09-16 DIAGNOSIS — M79.18 MYOFASCIAL PAIN SYNDROME: ICD-10-CM

## 2020-09-16 DIAGNOSIS — F99 INSOMNIA DUE TO OTHER MENTAL DISORDER: ICD-10-CM

## 2020-09-16 DIAGNOSIS — F51.05 INSOMNIA DUE TO OTHER MENTAL DISORDER: ICD-10-CM

## 2020-09-16 DIAGNOSIS — F41.1 GENERALIZED ANXIETY DISORDER: ICD-10-CM

## 2020-09-16 RX ORDER — TRAZODONE HYDROCHLORIDE 50 MG/1
100 TABLET ORAL
Qty: 180 TABLET | Refills: 1 | Status: SHIPPED | OUTPATIENT
Start: 2020-09-16 | End: 2021-03-16 | Stop reason: SDUPTHER

## 2020-09-16 RX ORDER — LORAZEPAM 0.5 MG/1
TABLET ORAL
Qty: 60 TABLET | Refills: 0 | Status: SHIPPED | OUTPATIENT
Start: 2020-09-16 | End: 2020-10-21 | Stop reason: SDUPTHER

## 2020-09-16 RX ORDER — TRAMADOL HYDROCHLORIDE 50 MG/1
TABLET ORAL
Qty: 180 TABLET | Refills: 0 | Status: SHIPPED | OUTPATIENT
Start: 2020-09-16 | End: 2020-12-21 | Stop reason: SDUPTHER

## 2020-10-21 DIAGNOSIS — F33.2 SEVERE RECURRENT MAJOR DEPRESSION WITHOUT PSYCHOTIC FEATURES (HCC): ICD-10-CM

## 2020-10-21 DIAGNOSIS — F41.1 GENERALIZED ANXIETY DISORDER: ICD-10-CM

## 2020-10-21 DIAGNOSIS — F99 INSOMNIA DUE TO OTHER MENTAL DISORDER: ICD-10-CM

## 2020-10-21 DIAGNOSIS — F51.05 INSOMNIA DUE TO OTHER MENTAL DISORDER: ICD-10-CM

## 2020-10-21 RX ORDER — LORAZEPAM 0.5 MG/1
TABLET ORAL
Qty: 60 TABLET | Refills: 0 | Status: SHIPPED | OUTPATIENT
Start: 2020-10-21 | End: 2020-11-19 | Stop reason: SDUPTHER

## 2020-11-19 DIAGNOSIS — F51.05 INSOMNIA DUE TO OTHER MENTAL DISORDER: ICD-10-CM

## 2020-11-19 DIAGNOSIS — F99 INSOMNIA DUE TO OTHER MENTAL DISORDER: ICD-10-CM

## 2020-11-19 DIAGNOSIS — F41.1 GENERALIZED ANXIETY DISORDER: ICD-10-CM

## 2020-11-19 DIAGNOSIS — F33.2 SEVERE RECURRENT MAJOR DEPRESSION WITHOUT PSYCHOTIC FEATURES (HCC): ICD-10-CM

## 2020-11-19 RX ORDER — LORAZEPAM 0.5 MG/1
TABLET ORAL
Qty: 60 TABLET | Refills: 0 | Status: SHIPPED | OUTPATIENT
Start: 2020-11-19 | End: 2020-12-21 | Stop reason: SDUPTHER

## 2020-11-19 RX ORDER — ARIPIPRAZOLE 5 MG/1
5 TABLET ORAL DAILY
Qty: 90 TABLET | Refills: 1 | Status: SHIPPED | OUTPATIENT
Start: 2020-11-19 | End: 2021-02-16 | Stop reason: SDUPTHER

## 2020-11-30 DIAGNOSIS — R20.0 NUMBNESS: ICD-10-CM

## 2020-11-30 DIAGNOSIS — F33.2 SEVERE RECURRENT MAJOR DEPRESSION WITHOUT PSYCHOTIC FEATURES (HCC): ICD-10-CM

## 2020-11-30 RX ORDER — BUPROPION HYDROCHLORIDE 75 MG/1
75 TABLET ORAL 2 TIMES DAILY
Qty: 180 TABLET | Refills: 0 | Status: CANCELLED | OUTPATIENT
Start: 2020-11-30

## 2020-11-30 RX ORDER — GABAPENTIN 300 MG/1
300 CAPSULE ORAL 2 TIMES DAILY
Qty: 180 CAPSULE | Refills: 0 | Status: CANCELLED | OUTPATIENT
Start: 2020-11-30

## 2020-12-01 DIAGNOSIS — F33.2 SEVERE RECURRENT MAJOR DEPRESSION WITHOUT PSYCHOTIC FEATURES (HCC): ICD-10-CM

## 2020-12-01 DIAGNOSIS — R20.0 NUMBNESS: ICD-10-CM

## 2020-12-01 RX ORDER — GABAPENTIN 300 MG/1
300 CAPSULE ORAL 2 TIMES DAILY
Qty: 180 CAPSULE | Refills: 1 | Status: SHIPPED | OUTPATIENT
Start: 2020-12-01 | End: 2021-05-24 | Stop reason: SDUPTHER

## 2020-12-01 RX ORDER — BUPROPION HYDROCHLORIDE 75 MG/1
75 TABLET ORAL 2 TIMES DAILY
Qty: 180 TABLET | Refills: 1 | Status: SHIPPED | OUTPATIENT
Start: 2020-12-01 | End: 2021-03-01 | Stop reason: SDUPTHER

## 2020-12-02 DIAGNOSIS — I10 ESSENTIAL HYPERTENSION: ICD-10-CM

## 2020-12-02 RX ORDER — LISINOPRIL 20 MG/1
20 TABLET ORAL DAILY
Qty: 90 TABLET | Refills: 1 | Status: SHIPPED | OUTPATIENT
Start: 2020-12-02 | End: 2021-03-01 | Stop reason: SDUPTHER

## 2020-12-15 DIAGNOSIS — E78.5 HYPERLIPIDEMIA, UNSPECIFIED HYPERLIPIDEMIA TYPE: ICD-10-CM

## 2020-12-15 RX ORDER — FENOFIBRATE 145 MG/1
TABLET, COATED ORAL
Qty: 90 TABLET | Refills: 1 | OUTPATIENT
Start: 2020-12-15

## 2020-12-21 DIAGNOSIS — F41.1 GENERALIZED ANXIETY DISORDER: ICD-10-CM

## 2020-12-21 DIAGNOSIS — M51.26 BULGING LUMBAR DISC: ICD-10-CM

## 2020-12-21 DIAGNOSIS — F99 INSOMNIA DUE TO OTHER MENTAL DISORDER: ICD-10-CM

## 2020-12-21 DIAGNOSIS — F51.05 INSOMNIA DUE TO OTHER MENTAL DISORDER: ICD-10-CM

## 2020-12-21 DIAGNOSIS — E78.5 HYPERLIPIDEMIA, UNSPECIFIED HYPERLIPIDEMIA TYPE: ICD-10-CM

## 2020-12-21 DIAGNOSIS — M79.18 MYOFASCIAL PAIN SYNDROME: ICD-10-CM

## 2020-12-21 RX ORDER — LORAZEPAM 0.5 MG/1
TABLET ORAL
Qty: 60 TABLET | Refills: 0 | Status: SHIPPED | OUTPATIENT
Start: 2020-12-21 | End: 2021-01-20 | Stop reason: SDUPTHER

## 2020-12-21 RX ORDER — TRAMADOL HYDROCHLORIDE 50 MG/1
TABLET ORAL
Qty: 180 TABLET | Refills: 0 | Status: SHIPPED | OUTPATIENT
Start: 2020-12-21 | End: 2021-01-20 | Stop reason: SDUPTHER

## 2020-12-21 RX ORDER — FENOFIBRATE 145 MG/1
145 TABLET, COATED ORAL DAILY
Qty: 90 TABLET | Refills: 1 | Status: SHIPPED | OUTPATIENT
Start: 2020-12-21 | End: 2021-06-15 | Stop reason: SDUPTHER

## 2021-01-20 DIAGNOSIS — F51.05 INSOMNIA DUE TO OTHER MENTAL DISORDER: ICD-10-CM

## 2021-01-20 DIAGNOSIS — F41.1 GENERALIZED ANXIETY DISORDER: ICD-10-CM

## 2021-01-20 DIAGNOSIS — F99 INSOMNIA DUE TO OTHER MENTAL DISORDER: ICD-10-CM

## 2021-01-20 DIAGNOSIS — M79.18 MYOFASCIAL PAIN SYNDROME: ICD-10-CM

## 2021-01-20 DIAGNOSIS — M51.26 BULGING LUMBAR DISC: ICD-10-CM

## 2021-01-21 RX ORDER — LORAZEPAM 0.5 MG/1
TABLET ORAL
Qty: 60 TABLET | Refills: 0 | Status: SHIPPED | OUTPATIENT
Start: 2021-01-21 | End: 2021-02-16 | Stop reason: SDUPTHER

## 2021-01-21 RX ORDER — TRAMADOL HYDROCHLORIDE 50 MG/1
TABLET ORAL
Qty: 180 TABLET | Refills: 0 | Status: SHIPPED | OUTPATIENT
Start: 2021-01-21 | End: 2021-03-01 | Stop reason: SDUPTHER

## 2021-02-06 DIAGNOSIS — F33.2 SEVERE RECURRENT MAJOR DEPRESSION WITHOUT PSYCHOTIC FEATURES (HCC): ICD-10-CM

## 2021-02-08 RX ORDER — ESCITALOPRAM OXALATE 10 MG/1
TABLET ORAL
Qty: 90 TABLET | Refills: 1 | OUTPATIENT
Start: 2021-02-08

## 2021-02-12 ENCOUNTER — OFFICE VISIT (OUTPATIENT)
Dept: FAMILY MEDICINE CLINIC | Facility: CLINIC | Age: 46
End: 2021-02-12
Payer: COMMERCIAL

## 2021-02-12 VITALS
RESPIRATION RATE: 20 BRPM | HEART RATE: 87 BPM | OXYGEN SATURATION: 95 % | SYSTOLIC BLOOD PRESSURE: 122 MMHG | TEMPERATURE: 97.5 F | BODY MASS INDEX: 33.69 KG/M2 | HEIGHT: 78 IN | DIASTOLIC BLOOD PRESSURE: 82 MMHG | WEIGHT: 291.2 LBS

## 2021-02-12 DIAGNOSIS — Z79.899 HIGH RISK MEDICATION USE: ICD-10-CM

## 2021-02-12 DIAGNOSIS — Z12.5 SCREENING FOR PROSTATE CANCER: ICD-10-CM

## 2021-02-12 DIAGNOSIS — F33.2 SEVERE RECURRENT MAJOR DEPRESSION WITHOUT PSYCHOTIC FEATURES (HCC): Primary | ICD-10-CM

## 2021-02-12 DIAGNOSIS — M51.26 BULGING LUMBAR DISC: ICD-10-CM

## 2021-02-12 DIAGNOSIS — E78.5 HYPERLIPIDEMIA, UNSPECIFIED HYPERLIPIDEMIA TYPE: ICD-10-CM

## 2021-02-12 DIAGNOSIS — I10 ESSENTIAL HYPERTENSION: ICD-10-CM

## 2021-02-12 DIAGNOSIS — Z13.6 SCREENING FOR CARDIOVASCULAR CONDITION: ICD-10-CM

## 2021-02-12 PROCEDURE — 3008F BODY MASS INDEX DOCD: CPT | Performed by: FAMILY MEDICINE

## 2021-02-12 PROCEDURE — 3725F SCREEN DEPRESSION PERFORMED: CPT | Performed by: FAMILY MEDICINE

## 2021-02-12 PROCEDURE — 99214 OFFICE O/P EST MOD 30 MIN: CPT | Performed by: FAMILY MEDICINE

## 2021-02-12 PROCEDURE — 1036F TOBACCO NON-USER: CPT | Performed by: FAMILY MEDICINE

## 2021-02-12 NOTE — PROGRESS NOTES
Assessment/Plan:     Diagnoses and all orders for this visit:    Severe recurrent major depression without psychotic features ([facility] [address] )    Essential hypertension    Bulging lumbar disc    Screening for cardiovascular condition  -     CBC and differential; Future  -     Comprehensive metabolic panel; Future  -     UA (URINE) with reflex to Scope; Future  -     CBC and differential  -     Comprehensive metabolic panel  -     UA (URINE) with reflex to Scope    Screening for prostate cancer  -     PSA, Total Screen; Future  -     PSA, Total Screen    Hyperlipidemia, unspecified hyperlipidemia type  -     Lipid panel; Future  -     TSH, 3rd generation with Free T4 reflex; Future  -     Lipid panel  -     TSH, 3rd generation with Free T4 reflex    High risk medication use  -     CBC and differential; Future  -     Comprehensive metabolic panel; Future  -     UA (URINE) with reflex to Scope; Future  -     CBC and differential  -     Comprehensive metabolic panel  -     UA (URINE) with reflex to Scope       Discussed that exercise weight loss  Overall patient is doing well  He will continue his current medications  Labs ordered   He can follow up in 6 months or sooner if needed      Subjective:     Chief Complaint   Patient presents with    Follow-up     Med check from 08/13/20  No concerns        Patient ID: Juana Campbell is a 50 y o  male  Patient is a day for six-month checkup chronic conditions  He reports no acute physical complaints today is been feeling well  He we did discuss his weight   Is remained stable over the course of the year   But patient still remains overweight  His medicines are stable         The following portions of the patient's history were reviewed and updated as appropriate: allergies, current medications, past family history, past medical history, past social history, past surgical history and problem list     Review of Systems   Constitutional: Negative  HENT: Negative      Eyes: Negative  Respiratory: Negative  Cardiovascular: Negative  Gastrointestinal: Negative  Endocrine: Negative  Genitourinary: Negative  Musculoskeletal: Negative  Skin: Negative  Allergic/Immunologic: Negative  Neurological: Negative  Hematological: Negative  Psychiatric/Behavioral: Negative  All other systems reviewed and are negative  Objective:    Vitals:    02/12/21 0800   BP: 122/82   BP Location: Left arm   Patient Position: Sitting   Cuff Size: Large   Pulse: 87   Resp: 20   Temp: 97 5 °F (36 4 °C)   TempSrc: Tympanic   SpO2: 95%   Weight: 132 kg (291 lb 3 2 oz)   Height: 6' 6" (1 981 m)          Physical Exam  Vitals signs and nursing note reviewed  Constitutional:       General: He is not in acute distress  Appearance: He is well-developed  HENT:      Head: Normocephalic  Right Ear: External ear normal       Left Ear: External ear normal       Nose: Nose normal    Eyes:      General:         Right eye: No discharge  Left eye: No discharge  Conjunctiva/sclera: Conjunctivae normal       Pupils: Pupils are equal, round, and reactive to light  Neck:      Musculoskeletal: Normal range of motion  Cardiovascular:      Rate and Rhythm: Normal rate and regular rhythm  Heart sounds: Normal heart sounds  Pulmonary:      Effort: Pulmonary effort is normal       Breath sounds: Normal breath sounds  Abdominal:      General: Bowel sounds are normal  There is no distension  Palpations: Abdomen is soft  Tenderness: There is no abdominal tenderness  Musculoskeletal: Normal range of motion  Skin:     General: Skin is warm and dry  Findings: No rash  Neurological:      Mental Status: He is alert and oriented to person, place, and time  Cranial Nerves: No cranial nerve deficit  Psychiatric:         Behavior: Behavior normal          Thought Content:  Thought content normal          Judgment: Judgment normal          BMI Counseling: Body mass index is 33 65 kg/m²  The BMI is above normal  Nutrition recommendations include reducing portion sizes, decreasing overall calorie intake, 3-5 servings of fruits/vegetables daily, reducing fast food intake, consuming healthier snacks, decreasing soda and/or juice intake, moderation in carbohydrate intake, increasing intake of lean protein, reducing intake of saturated fat and trans fat and reducing intake of cholesterol  Exercise recommendations include exercising 3-5 times per week

## 2021-02-12 NOTE — PATIENT INSTRUCTIONS
Obesity   AMBULATORY CARE:   Obesity  is when your body mass index (BMI) is greater than 30  Your healthcare provider will use your height and weight to measure your BMI  The risks of obesity include  many health problems, such as injuries or physical disability  You may need tests to check for the following:  · Diabetes    · High blood pressure or high cholesterol    · Heart disease    · Gallbladder or liver disease    · Cancer of the colon, breast, prostate, liver, or kidney    · Sleep apnea    · Arthritis or gout    Seek care immediately if:   · You have a severe headache, confusion, or difficulty speaking  · You have weakness on one side of your body  · You have chest pain, sweating, or shortness of breath  Contact your healthcare provider if:   · You have symptoms of gallbladder or liver disease, such as pain in your upper abdomen  · You have knee or hip pain and discomfort while walking  · You have symptoms of diabetes, such as intense hunger and thirst, and frequent urination  · You have symptoms of sleep apnea, such as snoring or daytime sleepiness  · You have questions or concerns about your condition or care  Treatment for obesity  focuses on helping you lose weight to improve your health  Even a small decrease in BMI can reduce the risk for many health problems  Your healthcare provider will help you set a weight-loss goal   · Lifestyle changes  are the first step in treating obesity  These include making healthy food choices and getting regular physical activity  Your healthcare provider may suggest a weight-loss program that involves coaching, education, and therapy  · Medicine  may help you lose weight when it is used with a healthy diet and physical activity  · Surgery  can help you lose weight if you are very obese and have other health problems  There are several types of weight-loss surgery  Ask your healthcare provider for more information      Be successful losing weight:   · Set small, realistic goals  An example of a small goal is to walk for 20 minutes 5 days a week  Anther goal is to lose 5% of your body weight  · Tell friends, family members, and coworkers about your goals  and ask for their support  Ask a friend to lose weight with you, or join a weight-loss support group  · Identify foods or triggers that may cause you to overeat , and find ways to avoid them  Remove tempting high-calorie foods from your home and workplace  Place a bowl of fresh fruit on your kitchen counter  If stress causes you to eat, then find other ways to cope with stress  · Keep a diary to track what you eat and drink  Also write down how many minutes of physical activity you do each day  Weigh yourself once a week and record it in your diary  Eating changes: You will need to eat 500 to 1,000 fewer calories each day than you currently eat to lose 1 to 2 pounds a week  The following changes will help you cut calories:  · Eat smaller portions  Use small plates, no larger than 9 inches in diameter  Fill your plate half full of fruits and vegetables  Measure your food using measuring cups until you know what a serving size looks like  · Eat 3 meals and 1 or 2 snacks each day  Plan your meals in advance  Amira Sosa and eat at home most of the time  Eat slowly  Do not skip meals  Skipping meals can lead to overeating later in the day  This can make it harder for you to lose weight  Talk with a dietitian to help you make a meal plan and schedule that is right for you  · Eat fruits and vegetables at every meal   They are low in calories and high in fiber, which makes you feel full  Do not add butter, margarine, or cream sauce to vegetables  Use herbs to season steamed vegetables  · Eat less fat and fewer fried foods  Eat more baked or grilled chicken and fish  These protein sources are lower in calories and fat than red meat  Limit fast food   Dress your salads with olive oil and vinegar instead of bottled dressing  · Limit the amount of sugar you eat  Do not drink sugary beverages  Limit alcohol  Activity changes:  Physical activity is good for your body in many ways  It helps you burn calories and build strong muscles  It decreases stress and depression, and improves your mood  It can also help you sleep better  Talk to your healthcare provider before you begin an exercise program   · Exercise for at least 30 minutes 5 days a week  Start slowly  Set aside time each day for physical activity that you enjoy and that is convenient for you  It is best to do both weight training and an activity that increases your heart rate, such as walking, bicycling, or swimming  · Find ways to be more active  Do yard work and housecleaning  Walk up the stairs instead of using elevators  Spend your leisure time going to events that require walking, such as outdoor festivals or fairs  This extra physical activity can help you lose weight and keep it off  Follow up with your healthcare provider as directed: You may need to meet with a dietitian  Write down your questions so you remember to ask them during your visits  © Copyright [address] [place] [address] Drive Information is for End User's use only and may not be sold, redistributed or otherwise used for commercial purposes  All illustrations and images included in CareNotes® are the copyrighted property of [employer] D [employer]  or [facility] Shaan Interiano   The above information is an  only  It is not intended as medical advice for individual conditions or treatments  Talk to your doctor, nurse or pharmacist before following any medical regimen to see if it is safe and effective for you  Weight Management   AMBULATORY CARE:   Why it is important to manage your weight:  Being overweight increases your risk of health conditions such as heart disease, high blood pressure, type 2 diabetes, and certain types of cancer   It can also increase your risk for osteoarthritis, sleep apnea, and other respiratory problems  Aim for a slow, steady weight loss  Even a small amount of weight loss can lower your risk of health problems  How to lose weight safely:  A safe and healthy way to lose weight is to eat fewer calories and get regular exercise  · You can lose up about 1 pound a week by decreasing the number of calories you eat by 500 calories each day  You can decrease calories by eating smaller portion sizes or by cutting out high-calorie foods  Read labels to find out how many calories are in the foods you eat  · You can also burn calories with exercise such as walking, swimming, or biking  You will be more likely to keep weight off if you make these changes part of your lifestyle  Exercise at least 30 minutes per day on most days of the week  You can also fit in more physical activity by taking the stairs instead of the elevator or parking farther away from stores  Ask your healthcare provider about the best exercise plan for you  Healthy meal plan for weight management:  A healthy meal plan includes a variety of foods, contains fewer calories, and helps you stay healthy  A healthy meal plan includes the following:     · Eat whole-grain foods more often  A healthy meal plan should contain fiber  Fiber is the part of grains, fruits, and vegetables that is not broken down by your body  Whole-grain foods are healthy and provide extra fiber in your diet  Some examples of whole-grain foods are whole-wheat breads and pastas, oatmeal, brown rice, and bulgur  · Eat a variety of vegetables every day  Include dark, leafy greens such as spinach, kale, martin greens, and mustard greens  Eat yellow and orange vegetables such as carrots, sweet potatoes, and winter squash  · Eat a variety of fruits every day  Choose fresh or canned fruit (canned in its own juice or light syrup) instead of juice  Fruit juice has very little or no fiber      · Eat low-fat dairy foods  Drink fat-free (skim) milk or 1% milk  Eat fat-free yogurt and low-fat cottage cheese  Try low-fat cheeses such as mozzarella and other reduced-fat cheeses  · Choose meat and other protein foods that are low in fat  Choose beans or other legumes such as split peas or lentils  Choose fish, skinless poultry (chicken or turkey), or lean cuts of red meat (beef or pork)  Before you cook meat or poultry, cut off any visible fat  · Use less fat and oil  Try baking foods instead of frying them  Add less fat, such as margarine, sour cream, regular salad dressing and mayonnaise to foods  Eat fewer high-fat foods  Some examples of high-fat foods include french fries, doughnuts, ice cream, and cakes  · Eat fewer sweets  Limit foods and drinks that are high in sugar  This includes candy, cookies, regular soda, and sweetened drinks  Ways to decrease calories:   · Eat smaller portions  ? Use a small plate with smaller servings  ? Do not eat second helpings  ? When you eat at a restaurant, ask for a box and place half of your meal in the box before you eat  ? Share an entrée with someone else  · Replace high-calorie snacks with healthy, low-calorie snacks  ? Choose fresh fruit, vegetables, fat-free rice cakes, or air-popped popcorn instead of potato chips, nuts, or chocolate  ? Choose water or calorie-free drinks instead of soda or sweetened drinks  · Do not shop for groceries when you are hungry  You may be more likely to make unhealthy food choices  Take a grocery list of healthy foods and shop after you have eaten  · Eat regular meals  Do not skip meals  Skipping meals can lead to overeating later in the day  This can make it harder for you to lose weight  Eat a healthy snack in place of a meal if you do not have time to eat a regular meal  Talk with a dietitian to help you create a meal plan and schedule that is right for you      Other things to consider as you try to lose weight:   · Be aware of situations that may give you the urge to overeat, such as eating while watching television  Find ways to avoid these situations  For example, read a book, go for a walk, or do crafts  · Meet with a weight loss support group or friends who are also trying to lose weight  This may help you stay motivated to continue working on your weight loss goals  © Copyright [address] Information is for End User's use only and may not be sold, redistributed or otherwise used for commercial purposes  All illustrations and images included in CareNotes® are the copyrighted property of [employer]  or [facility] Shaan Interiano   The above information is an  only  It is not intended as medical advice for individual conditions or treatments  Talk to your doctor, nurse or pharmacist before following any medical regimen to see if it is safe and effective for you

## 2021-02-16 DIAGNOSIS — F51.05 INSOMNIA DUE TO OTHER MENTAL DISORDER: ICD-10-CM

## 2021-02-16 DIAGNOSIS — F33.2 SEVERE RECURRENT MAJOR DEPRESSION WITHOUT PSYCHOTIC FEATURES (HCC): ICD-10-CM

## 2021-02-16 DIAGNOSIS — F41.1 GENERALIZED ANXIETY DISORDER: ICD-10-CM

## 2021-02-16 DIAGNOSIS — F99 INSOMNIA DUE TO OTHER MENTAL DISORDER: ICD-10-CM

## 2021-02-16 RX ORDER — ARIPIPRAZOLE 5 MG/1
5 TABLET ORAL DAILY
Qty: 90 TABLET | Refills: 1 | Status: SHIPPED | OUTPATIENT
Start: 2021-02-16 | End: 2021-08-04

## 2021-02-16 RX ORDER — ESCITALOPRAM OXALATE 10 MG/1
10 TABLET ORAL DAILY
Qty: 90 TABLET | Refills: 1 | Status: SHIPPED | OUTPATIENT
Start: 2021-02-16 | End: 2021-08-02 | Stop reason: SDUPTHER

## 2021-02-16 RX ORDER — LORAZEPAM 0.5 MG/1
TABLET ORAL
Qty: 60 TABLET | Refills: 0 | Status: SHIPPED | OUTPATIENT
Start: 2021-02-16 | End: 2021-03-16 | Stop reason: SDUPTHER

## 2021-02-27 DIAGNOSIS — F33.2 SEVERE RECURRENT MAJOR DEPRESSION WITHOUT PSYCHOTIC FEATURES (HCC): ICD-10-CM

## 2021-02-27 DIAGNOSIS — M79.18 MYOFASCIAL PAIN SYNDROME: ICD-10-CM

## 2021-02-27 DIAGNOSIS — M51.26 BULGING LUMBAR DISC: ICD-10-CM

## 2021-02-27 DIAGNOSIS — I10 ESSENTIAL HYPERTENSION: ICD-10-CM

## 2021-02-27 RX ORDER — BUPROPION HYDROCHLORIDE 75 MG/1
75 TABLET ORAL 2 TIMES DAILY
Qty: 180 TABLET | Refills: 0 | Status: CANCELLED | OUTPATIENT
Start: 2021-02-27

## 2021-02-27 RX ORDER — LISINOPRIL 20 MG/1
20 TABLET ORAL DAILY
Qty: 90 TABLET | Refills: 0 | Status: CANCELLED | OUTPATIENT
Start: 2021-02-27

## 2021-02-27 RX ORDER — TRAMADOL HYDROCHLORIDE 50 MG/1
TABLET ORAL
Qty: 180 TABLET | Refills: 0 | Status: CANCELLED | OUTPATIENT
Start: 2021-02-27

## 2021-03-01 DIAGNOSIS — I10 ESSENTIAL HYPERTENSION: ICD-10-CM

## 2021-03-01 DIAGNOSIS — M51.26 BULGING LUMBAR DISC: ICD-10-CM

## 2021-03-01 DIAGNOSIS — F33.2 SEVERE RECURRENT MAJOR DEPRESSION WITHOUT PSYCHOTIC FEATURES (HCC): ICD-10-CM

## 2021-03-01 DIAGNOSIS — M79.18 MYOFASCIAL PAIN SYNDROME: ICD-10-CM

## 2021-03-01 RX ORDER — TRAMADOL HYDROCHLORIDE 50 MG/1
TABLET ORAL
Qty: 180 TABLET | Refills: 0 | Status: SHIPPED | OUTPATIENT
Start: 2021-03-01 | End: 2021-04-07 | Stop reason: SDUPTHER

## 2021-03-01 RX ORDER — LISINOPRIL 20 MG/1
20 TABLET ORAL DAILY
Qty: 90 TABLET | Refills: 1 | Status: SHIPPED | OUTPATIENT
Start: 2021-03-01 | End: 2021-05-20 | Stop reason: SDUPTHER

## 2021-03-01 RX ORDER — BUPROPION HYDROCHLORIDE 75 MG/1
75 TABLET ORAL 2 TIMES DAILY
Qty: 180 TABLET | Refills: 1 | Status: SHIPPED | OUTPATIENT
Start: 2021-03-01 | End: 2021-11-15

## 2021-03-10 DIAGNOSIS — Z23 ENCOUNTER FOR IMMUNIZATION: ICD-10-CM

## 2021-03-16 DIAGNOSIS — F51.05 INSOMNIA DUE TO OTHER MENTAL DISORDER: ICD-10-CM

## 2021-03-16 DIAGNOSIS — F99 INSOMNIA DUE TO OTHER MENTAL DISORDER: ICD-10-CM

## 2021-03-16 DIAGNOSIS — F41.1 GENERALIZED ANXIETY DISORDER: ICD-10-CM

## 2021-03-16 RX ORDER — TRAZODONE HYDROCHLORIDE 50 MG/1
100 TABLET ORAL
Qty: 180 TABLET | Refills: 1 | OUTPATIENT
Start: 2021-03-16

## 2021-03-16 RX ORDER — TRAZODONE HYDROCHLORIDE 50 MG/1
100 TABLET ORAL
Qty: 180 TABLET | Refills: 0 | Status: SHIPPED | OUTPATIENT
Start: 2021-03-16 | End: 2021-06-15 | Stop reason: SDUPTHER

## 2021-03-16 RX ORDER — LORAZEPAM 0.5 MG/1
TABLET ORAL
Qty: 60 TABLET | Refills: 1 | Status: SHIPPED | OUTPATIENT
Start: 2021-03-16 | End: 2021-04-20 | Stop reason: SDUPTHER

## 2021-04-07 DIAGNOSIS — M79.18 MYOFASCIAL PAIN SYNDROME: ICD-10-CM

## 2021-04-07 DIAGNOSIS — M51.26 BULGING LUMBAR DISC: ICD-10-CM

## 2021-04-08 RX ORDER — TRAMADOL HYDROCHLORIDE 50 MG/1
TABLET ORAL
Qty: 180 TABLET | Refills: 0 | Status: SHIPPED | OUTPATIENT
Start: 2021-04-08 | End: 2021-05-14 | Stop reason: SDUPTHER

## 2021-04-20 DIAGNOSIS — F41.1 GENERALIZED ANXIETY DISORDER: ICD-10-CM

## 2021-04-20 DIAGNOSIS — F51.05 INSOMNIA DUE TO OTHER MENTAL DISORDER: ICD-10-CM

## 2021-04-20 DIAGNOSIS — F99 INSOMNIA DUE TO OTHER MENTAL DISORDER: ICD-10-CM

## 2021-04-20 RX ORDER — LORAZEPAM 0.5 MG/1
TABLET ORAL
Qty: 60 TABLET | Refills: 0 | Status: SHIPPED | OUTPATIENT
Start: 2021-04-20 | End: 2021-05-20 | Stop reason: SDUPTHER

## 2021-05-14 DIAGNOSIS — M79.18 MYOFASCIAL PAIN SYNDROME: ICD-10-CM

## 2021-05-14 DIAGNOSIS — M51.26 BULGING LUMBAR DISC: ICD-10-CM

## 2021-05-14 RX ORDER — TRAMADOL HYDROCHLORIDE 50 MG/1
TABLET ORAL
Qty: 180 TABLET | Refills: 0 | Status: SHIPPED | OUTPATIENT
Start: 2021-05-14 | End: 2021-06-15 | Stop reason: SDUPTHER

## 2021-05-20 DIAGNOSIS — F41.1 GENERALIZED ANXIETY DISORDER: ICD-10-CM

## 2021-05-20 DIAGNOSIS — F51.05 INSOMNIA DUE TO OTHER MENTAL DISORDER: ICD-10-CM

## 2021-05-20 DIAGNOSIS — I10 ESSENTIAL HYPERTENSION: ICD-10-CM

## 2021-05-20 DIAGNOSIS — F99 INSOMNIA DUE TO OTHER MENTAL DISORDER: ICD-10-CM

## 2021-05-21 RX ORDER — LORAZEPAM 0.5 MG/1
TABLET ORAL
Qty: 60 TABLET | Refills: 0 | Status: SHIPPED | OUTPATIENT
Start: 2021-05-21 | End: 2021-06-15 | Stop reason: SDUPTHER

## 2021-05-21 RX ORDER — LISINOPRIL 20 MG/1
20 TABLET ORAL DAILY
Qty: 90 TABLET | Refills: 1 | Status: SHIPPED | OUTPATIENT
Start: 2021-05-21 | End: 2021-08-17 | Stop reason: SDUPTHER

## 2021-05-22 DIAGNOSIS — R20.0 NUMBNESS: ICD-10-CM

## 2021-05-24 DIAGNOSIS — R20.0 NUMBNESS: ICD-10-CM

## 2021-05-24 RX ORDER — GABAPENTIN 300 MG/1
CAPSULE ORAL
Qty: 180 CAPSULE | Refills: 1 | OUTPATIENT
Start: 2021-05-24

## 2021-05-24 RX ORDER — GABAPENTIN 300 MG/1
300 CAPSULE ORAL 2 TIMES DAILY
Qty: 180 CAPSULE | Refills: 1 | Status: SHIPPED | OUTPATIENT
Start: 2021-05-24 | End: 2021-11-15

## 2021-05-28 DIAGNOSIS — F33.2 SEVERE RECURRENT MAJOR DEPRESSION WITHOUT PSYCHOTIC FEATURES (HCC): ICD-10-CM

## 2021-05-28 RX ORDER — BUPROPION HYDROCHLORIDE 75 MG/1
75 TABLET ORAL 2 TIMES DAILY
Qty: 180 TABLET | Refills: 0 | Status: CANCELLED | OUTPATIENT
Start: 2021-05-28

## 2021-06-06 DIAGNOSIS — F51.05 INSOMNIA DUE TO OTHER MENTAL DISORDER: ICD-10-CM

## 2021-06-06 DIAGNOSIS — F99 INSOMNIA DUE TO OTHER MENTAL DISORDER: ICD-10-CM

## 2021-06-07 RX ORDER — TRAZODONE HYDROCHLORIDE 50 MG/1
100 TABLET ORAL
Qty: 180 TABLET | Refills: 0 | OUTPATIENT
Start: 2021-06-07

## 2021-06-09 DIAGNOSIS — E78.5 HYPERLIPIDEMIA, UNSPECIFIED HYPERLIPIDEMIA TYPE: ICD-10-CM

## 2021-06-09 RX ORDER — FENOFIBRATE 145 MG/1
TABLET, COATED ORAL
Qty: 90 TABLET | Refills: 1 | OUTPATIENT
Start: 2021-06-09

## 2021-06-14 DIAGNOSIS — F51.05 INSOMNIA DUE TO OTHER MENTAL DISORDER: ICD-10-CM

## 2021-06-14 DIAGNOSIS — M79.18 MYOFASCIAL PAIN SYNDROME: ICD-10-CM

## 2021-06-14 DIAGNOSIS — F99 INSOMNIA DUE TO OTHER MENTAL DISORDER: ICD-10-CM

## 2021-06-14 DIAGNOSIS — F41.1 GENERALIZED ANXIETY DISORDER: ICD-10-CM

## 2021-06-14 DIAGNOSIS — M51.26 BULGING LUMBAR DISC: ICD-10-CM

## 2021-06-14 DIAGNOSIS — E78.5 HYPERLIPIDEMIA, UNSPECIFIED HYPERLIPIDEMIA TYPE: ICD-10-CM

## 2021-06-14 RX ORDER — LORAZEPAM 0.5 MG/1
TABLET ORAL
Qty: 60 TABLET | Refills: 0 | Status: CANCELLED | OUTPATIENT
Start: 2021-06-14

## 2021-06-14 RX ORDER — TRAZODONE HYDROCHLORIDE 50 MG/1
100 TABLET ORAL
Qty: 180 TABLET | Refills: 0 | Status: CANCELLED | OUTPATIENT
Start: 2021-06-14

## 2021-06-14 RX ORDER — TRAMADOL HYDROCHLORIDE 50 MG/1
TABLET ORAL
Qty: 180 TABLET | Refills: 0 | Status: CANCELLED | OUTPATIENT
Start: 2021-06-14

## 2021-06-14 RX ORDER — FENOFIBRATE 145 MG/1
145 TABLET, COATED ORAL DAILY
Qty: 90 TABLET | Refills: 0 | Status: CANCELLED | OUTPATIENT
Start: 2021-06-14

## 2021-06-15 DIAGNOSIS — F41.1 GENERALIZED ANXIETY DISORDER: ICD-10-CM

## 2021-06-15 DIAGNOSIS — M51.26 BULGING LUMBAR DISC: ICD-10-CM

## 2021-06-15 DIAGNOSIS — F99 INSOMNIA DUE TO OTHER MENTAL DISORDER: ICD-10-CM

## 2021-06-15 DIAGNOSIS — M79.18 MYOFASCIAL PAIN SYNDROME: ICD-10-CM

## 2021-06-15 DIAGNOSIS — F51.05 INSOMNIA DUE TO OTHER MENTAL DISORDER: ICD-10-CM

## 2021-06-15 DIAGNOSIS — E78.5 HYPERLIPIDEMIA, UNSPECIFIED HYPERLIPIDEMIA TYPE: ICD-10-CM

## 2021-06-15 RX ORDER — TRAZODONE HYDROCHLORIDE 50 MG/1
100 TABLET ORAL
Qty: 180 TABLET | Refills: 0 | Status: SHIPPED | OUTPATIENT
Start: 2021-06-15 | End: 2021-09-16 | Stop reason: SDUPTHER

## 2021-06-15 RX ORDER — FENOFIBRATE 145 MG/1
145 TABLET, COATED ORAL DAILY
Qty: 90 TABLET | Refills: 1 | Status: SHIPPED | OUTPATIENT
Start: 2021-06-15 | End: 2021-09-16 | Stop reason: SDUPTHER

## 2021-06-15 RX ORDER — LORAZEPAM 0.5 MG/1
TABLET ORAL
Qty: 60 TABLET | Refills: 0 | Status: SHIPPED | OUTPATIENT
Start: 2021-06-21 | End: 2021-07-14 | Stop reason: SDUPTHER

## 2021-06-15 RX ORDER — TRAMADOL HYDROCHLORIDE 50 MG/1
TABLET ORAL
Qty: 180 TABLET | Refills: 0 | Status: SHIPPED | OUTPATIENT
Start: 2021-06-15 | End: 2021-07-20 | Stop reason: SDUPTHER

## 2021-07-20 DIAGNOSIS — M51.26 BULGING LUMBAR DISC: ICD-10-CM

## 2021-07-20 DIAGNOSIS — M79.18 MYOFASCIAL PAIN SYNDROME: ICD-10-CM

## 2021-07-20 RX ORDER — TRAMADOL HYDROCHLORIDE 50 MG/1
TABLET ORAL
Qty: 180 TABLET | Refills: 0 | Status: SHIPPED | OUTPATIENT
Start: 2021-07-20 | End: 2021-09-16 | Stop reason: SDUPTHER

## 2021-08-01 DIAGNOSIS — F33.2 SEVERE RECURRENT MAJOR DEPRESSION WITHOUT PSYCHOTIC FEATURES (HCC): ICD-10-CM

## 2021-08-02 ENCOUNTER — TELEPHONE (OUTPATIENT)
Dept: FAMILY MEDICINE CLINIC | Facility: CLINIC | Age: 46
End: 2021-08-02

## 2021-08-02 DIAGNOSIS — F33.2 SEVERE RECURRENT MAJOR DEPRESSION WITHOUT PSYCHOTIC FEATURES (HCC): ICD-10-CM

## 2021-08-02 RX ORDER — ESCITALOPRAM OXALATE 10 MG/1
TABLET ORAL
Qty: 90 TABLET | Refills: 1 | OUTPATIENT
Start: 2021-08-02

## 2021-08-02 RX ORDER — ESCITALOPRAM OXALATE 10 MG/1
10 TABLET ORAL DAILY
Qty: 90 TABLET | Refills: 1 | Status: SHIPPED | OUTPATIENT
Start: 2021-08-02 | End: 2021-08-17 | Stop reason: SDUPTHER

## 2021-08-02 NOTE — TELEPHONE ENCOUNTER
Lorel Radon called stating that he needs a Prior Auth for  traMADol (ULTRAM) 50 mg tablet [[id number]]     Order Details  [id number] [place] - [address] [facility]   [address] [place] [address], [address] [place] [address]   Phone:  [phone]  Fax:  [phone]   KAY #:  [id number]

## 2021-08-04 DIAGNOSIS — F33.2 SEVERE RECURRENT MAJOR DEPRESSION WITHOUT PSYCHOTIC FEATURES (HCC): ICD-10-CM

## 2021-08-04 RX ORDER — ARIPIPRAZOLE 5 MG/1
5 TABLET ORAL DAILY
Qty: 90 TABLET | Refills: 1 | Status: SHIPPED | OUTPATIENT
Start: 2021-08-04 | End: 2021-08-17 | Stop reason: SDUPTHER

## 2021-08-10 LAB
ALBUMIN SERPL-MCNC: 4.3 G/DL (ref 3.6–5.1)
ALBUMIN/GLOB SERPL: 1.6 (CALC) (ref 1–2.5)
ALP SERPL-CCNC: 52 U/L (ref 36–130)
ALT SERPL-CCNC: 38 U/L (ref 9–46)
APPEARANCE UR: CLEAR
AST SERPL-CCNC: 29 U/L (ref 10–40)
BACTERIA UR QL AUTO: ABNORMAL /HPF
BASOPHILS # BLD AUTO: 53 CELLS/UL (ref 0–200)
BASOPHILS NFR BLD AUTO: 0.7 %
BILIRUB SERPL-MCNC: 0.4 MG/DL (ref 0.2–1.2)
BILIRUB UR QL STRIP: NEGATIVE
BUN SERPL-MCNC: 11 MG/DL (ref 7–25)
BUN/CREAT SERPL: ABNORMAL (CALC) (ref 6–22)
CALCIUM SERPL-MCNC: 9.5 MG/DL (ref 8.6–10.3)
CHLORIDE SERPL-SCNC: 104 MMOL/L (ref 98–110)
CHOLEST SERPL-MCNC: 165 MG/DL
CHOLEST/HDLC SERPL: 4.1 (CALC)
CO2 SERPL-SCNC: 25 MMOL/L (ref 20–32)
COLOR UR: ABNORMAL
CREAT SERPL-MCNC: 1.12 MG/DL (ref 0.6–1.35)
EOSINOPHIL # BLD AUTO: 255 CELLS/UL (ref 15–500)
EOSINOPHIL NFR BLD AUTO: 3.4 %
ERYTHROCYTE [DISTWIDTH] IN BLOOD BY AUTOMATED COUNT: 12.5 % (ref 11–15)
GLOBULIN SER CALC-MCNC: 2.7 G/DL (CALC) (ref 1.9–3.7)
GLUCOSE SERPL-MCNC: 125 MG/DL (ref 65–99)
GLUCOSE UR QL STRIP: NEGATIVE
HCT VFR BLD AUTO: 40.9 % (ref 38.5–50)
HDLC SERPL-MCNC: 40 MG/DL
HGB BLD-MCNC: 14 G/DL (ref 13.2–17.1)
HGB UR QL STRIP: NEGATIVE
HYALINE CASTS #/AREA URNS LPF: ABNORMAL /LPF
KETONES UR QL STRIP: ABNORMAL
LDLC SERPL CALC-MCNC: 85 MG/DL (CALC)
LEUKOCYTE ESTERASE UR QL STRIP: NEGATIVE
LYMPHOCYTES # BLD AUTO: 4328 CELLS/UL (ref 850–3900)
LYMPHOCYTES NFR BLD AUTO: 57.7 %
MCH RBC QN AUTO: 30.1 PG (ref 27–33)
MCHC RBC AUTO-ENTMCNC: 34.2 G/DL (ref 32–36)
MCV RBC AUTO: 88 FL (ref 80–100)
MONOCYTES # BLD AUTO: 435 CELLS/UL (ref 200–950)
MONOCYTES NFR BLD AUTO: 5.8 %
NEUTROPHILS # BLD AUTO: 2430 CELLS/UL (ref 1500–7800)
NEUTROPHILS NFR BLD AUTO: 32.4 %
NITRITE UR QL STRIP: NEGATIVE
NONHDLC SERPL-MCNC: 125 MG/DL (CALC)
PH UR STRIP: ABNORMAL [PH] (ref 5–8)
PLATELET # BLD AUTO: 284 THOUSAND/UL (ref 140–400)
PMV BLD REES-ECKER: 10.8 FL (ref 7.5–12.5)
POTASSIUM SERPL-SCNC: 4.2 MMOL/L (ref 3.5–5.3)
PROT SERPL-MCNC: 7 G/DL (ref 6.1–8.1)
PROT UR QL STRIP: ABNORMAL
PSA SERPL-MCNC: 0.4 NG/ML
RBC # BLD AUTO: 4.65 MILLION/UL (ref 4.2–5.8)
RBC #/AREA URNS HPF: ABNORMAL /HPF
SL AMB EGFR AFRICAN AMERICAN: 90 ML/MIN/1.73M2
SL AMB EGFR NON AFRICAN AMERICAN: 77 ML/MIN/1.73M2
SODIUM SERPL-SCNC: 141 MMOL/L (ref 135–146)
SP GR UR STRIP: 1.02 (ref 1–1.03)
SQUAMOUS #/AREA URNS HPF: ABNORMAL /HPF
TRIGL SERPL-MCNC: 334 MG/DL
TSH SERPL-ACNC: 2.77 MIU/L (ref 0.4–4.5)
WBC # BLD AUTO: 7.5 THOUSAND/UL (ref 3.8–10.8)
WBC #/AREA URNS HPF: ABNORMAL /HPF

## 2021-08-18 ENCOUNTER — OFFICE VISIT (OUTPATIENT)
Dept: FAMILY MEDICINE CLINIC | Facility: CLINIC | Age: 46
End: 2021-08-18
Payer: COMMERCIAL

## 2021-08-18 VITALS
WEIGHT: 288 LBS | HEART RATE: 87 BPM | BODY MASS INDEX: 35.81 KG/M2 | RESPIRATION RATE: 16 BRPM | HEIGHT: 75 IN | SYSTOLIC BLOOD PRESSURE: 112 MMHG | OXYGEN SATURATION: 97 % | TEMPERATURE: 97 F | DIASTOLIC BLOOD PRESSURE: 76 MMHG

## 2021-08-18 DIAGNOSIS — Z00.00 ENCOUNTER FOR WELL ADULT EXAM WITHOUT ABNORMAL FINDINGS: Primary | ICD-10-CM

## 2021-08-18 PROCEDURE — 3725F SCREEN DEPRESSION PERFORMED: CPT | Performed by: FAMILY MEDICINE

## 2021-08-18 PROCEDURE — 3008F BODY MASS INDEX DOCD: CPT | Performed by: FAMILY MEDICINE

## 2021-08-18 PROCEDURE — 99396 PREV VISIT EST AGE 40-64: CPT | Performed by: FAMILY MEDICINE

## 2021-08-18 PROCEDURE — 1036F TOBACCO NON-USER: CPT | Performed by: FAMILY MEDICINE

## 2021-08-18 NOTE — PROGRESS NOTES
Assessment/Plan:     Diagnoses and all orders for this visit:    Encounter for well adult exam without abnormal findings       52-year-old male  Patient has a history of high triglycerides and his recent lab work showed today were still elevated   Discussed diet exercise and trying to lose weight   Patient will continue the fenofibrate  Otherwise leave his muscle cramping was due to deconditioning and his core muscles as well as some dehydration issues  He will work on this   He will continue his current medications  He is up-to-date on health maintenance and he can follow up in 6 months for his med check      Subjective:     Chief Complaint   Patient presents with    Annual Exam     47 y/o male        Patient ID: Trevon Lua is a 48 y o  male  Patient presents today for yearly physical   He reports no acute complaints other than some cramping in his ribs with certain activities   Otherwise he is feeling well   his meds are stable      The following portions of the patient's history were reviewed and updated as appropriate: allergies, current medications, past family history, past medical history, past social history, past surgical history and problem list     Review of Systems   Constitutional: Negative  HENT: Negative  Eyes: Negative  Respiratory: Negative  Cardiovascular: Negative  Gastrointestinal: Negative  Endocrine: Negative  Genitourinary: Negative  Musculoskeletal: Negative  Skin: Negative  Allergic/Immunologic: Negative  Neurological: Negative  Hematological: Negative  Psychiatric/Behavioral: Negative  All other systems reviewed and are negative          Objective:    Vitals:    08/18/21 0806   BP: 112/76   BP Location: Right arm   Patient Position: Sitting   Cuff Size: Large   Pulse: 87   Resp: 16   Temp: (!) 97 °F (36 1 °C)   TempSrc: Tympanic   SpO2: 97%   Weight: 131 kg (288 lb)   Height: 6' 2 5" (1 892 m)          Physical Exam  Vitals and nursing note reviewed  Constitutional:       General: He is not in acute distress  Appearance: He is well-developed  HENT:      Head: Normocephalic  Right Ear: External ear normal       Left Ear: External ear normal       Nose: Nose normal    Eyes:      General:         Right eye: No discharge  Left eye: No discharge  Conjunctiva/sclera: Conjunctivae normal       Pupils: Pupils are equal, round, and reactive to light  Cardiovascular:      Rate and Rhythm: Normal rate and regular rhythm  Heart sounds: Normal heart sounds  Pulmonary:      Effort: Pulmonary effort is normal       Breath sounds: Normal breath sounds  Abdominal:      General: Bowel sounds are normal  There is no distension  Palpations: Abdomen is soft  Tenderness: There is no abdominal tenderness  Musculoskeletal:         General: Normal range of motion  Cervical back: Normal range of motion  Skin:     General: Skin is warm and dry  Findings: No rash  Neurological:      Mental Status: He is alert and oriented to person, place, and time  Cranial Nerves: No cranial nerve deficit  Psychiatric:         Behavior: Behavior normal          Thought Content: Thought content normal          Judgment: Judgment normal          BMI Counseling: Body mass index is 36 48 kg/m²  The BMI is above normal  Nutrition recommendations include reducing portion sizes, decreasing overall calorie intake, 3-5 servings of fruits/vegetables daily, reducing fast food intake, consuming healthier snacks, decreasing soda and/or juice intake, moderation in carbohydrate intake, increasing intake of lean protein, reducing intake of saturated fat and trans fat and reducing intake of cholesterol  Exercise recommendations include exercising 3-5 times per week

## 2021-08-18 NOTE — PATIENT INSTRUCTIONS
Obesity   AMBULATORY CARE:   Obesity  is when your body mass index (BMI) is greater than 30  Your healthcare provider will use your height and weight to measure your BMI  The risks of obesity include  many health problems, such as injuries or physical disability  You may need tests to check for the following:  · Diabetes    · High blood pressure or high cholesterol    · Heart disease    · Gallbladder or liver disease    · Cancer of the colon, breast, prostate, liver, or kidney    · Sleep apnea    · Arthritis or gout    Seek care immediately if:   · You have a severe headache, confusion, or difficulty speaking  · You have weakness on one side of your body  · You have chest pain, sweating, or shortness of breath  Contact your healthcare provider if:   · You have symptoms of gallbladder or liver disease, such as pain in your upper abdomen  · You have knee or hip pain and discomfort while walking  · You have symptoms of diabetes, such as intense hunger and thirst, and frequent urination  · You have symptoms of sleep apnea, such as snoring or daytime sleepiness  · You have questions or concerns about your condition or care  Treatment for obesity  focuses on helping you lose weight to improve your health  Even a small decrease in BMI can reduce the risk for many health problems  Your healthcare provider will help you set a weight-loss goal   · Lifestyle changes  are the first step in treating obesity  These include making healthy food choices and getting regular physical activity  Your healthcare provider may suggest a weight-loss program that involves coaching, education, and therapy  · Medicine  may help you lose weight when it is used with a healthy diet and physical activity  · Surgery  can help you lose weight if you are very obese and have other health problems  There are several types of weight-loss surgery  Ask your healthcare provider for more information      Be successful losing weight:   · Set small, realistic goals  An example of a small goal is to walk for 20 minutes 5 days a week  Anther goal is to lose 5% of your body weight  · Tell friends, family members, and coworkers about your goals  and ask for their support  Ask a friend to lose weight with you, or join a weight-loss support group  · Identify foods or triggers that may cause you to overeat , and find ways to avoid them  Remove tempting high-calorie foods from your home and workplace  Place a bowl of fresh fruit on your kitchen counter  If stress causes you to eat, then find other ways to cope with stress  · Keep a diary to track what you eat and drink  Also write down how many minutes of physical activity you do each day  Weigh yourself once a week and record it in your diary  Eating changes: You will need to eat 500 to 1,000 fewer calories each day than you currently eat to lose 1 to 2 pounds a week  The following changes will help you cut calories:  · Eat smaller portions  Use small plates, no larger than 9 inches in diameter  Fill your plate half full of fruits and vegetables  Measure your food using measuring cups until you know what a serving size looks like  · Eat 3 meals and 1 or 2 snacks each day  Plan your meals in advance  [facility] and eat at home most of the time  Eat slowly  Do not skip meals  Skipping meals can lead to overeating later in the day  This can make it harder for you to lose weight  Talk with a dietitian to help you make a meal plan and schedule that is right for you  · Eat fruits and vegetables at every meal   They are low in calories and high in fiber, which makes you feel full  Do not add butter, margarine, or cream sauce to vegetables  Use herbs to season steamed vegetables  · Eat less fat and fewer fried foods  Eat more baked or grilled chicken and fish  These protein sources are lower in calories and fat than red meat  Limit fast food   Dress your salads with olive oil and vinegar instead of bottled dressing  · Limit the amount of sugar you eat  Do not drink sugary beverages  Limit alcohol  Activity changes:  Physical activity is good for your body in many ways  It helps you burn calories and build strong muscles  It decreases stress and depression, and improves your mood  It can also help you sleep better  Talk to your healthcare provider before you begin an exercise program   · Exercise for at least 30 minutes 5 days a week  Start slowly  Set aside time each day for physical activity that you enjoy and that is convenient for you  It is best to do both weight training and an activity that increases your heart rate, such as walking, bicycling, or swimming  · Find ways to be more active  Do yard work and housecleaning  Walk up the stairs instead of using elevators  Spend your leisure time going to events that require walking, such as outdoor festivals or fairs  This extra physical activity can help you lose weight and keep it off  Follow up with your healthcare provider as directed: You may need to meet with a dietitian  Write down your questions so you remember to ask them during your visits  © Copyright [employer] 2021 Information is for End User's use only and may not be sold, redistributed or otherwise used for commercial purposes  All illustrations and images included in CareNotes® are the copyrighted property of A D A M , Inc  or [facility] Shaan [employer]   The above information is an  only  It is not intended as medical advice for individual conditions or treatments  Talk to your doctor, nurse or pharmacist before following any medical regimen to see if it is safe and effective for you  Weight Management   AMBULATORY CARE:   Why it is important to manage your weight:  Being overweight increases your risk of health conditions such as heart disease, high blood pressure, type 2 diabetes, and certain types of cancer   It can also increase your risk for osteoarthritis, sleep apnea, and other respiratory problems  Aim for a slow, steady weight loss  Even a small amount of weight loss can lower your risk of health problems  How to lose weight safely:  A safe and healthy way to lose weight is to eat fewer calories and get regular exercise  · You can lose up about 1 pound a week by decreasing the number of calories you eat by 500 calories each day  You can decrease calories by eating smaller portion sizes or by cutting out high-calorie foods  Read labels to find out how many calories are in the foods you eat  · You can also burn calories with exercise such as walking, swimming, or biking  You will be more likely to keep weight off if you make these changes part of your lifestyle  Exercise at least 30 minutes per day on most days of the week  You can also fit in more physical activity by taking the stairs instead of the elevator or parking farther away from stores  Ask your healthcare provider about the best exercise plan for you  Healthy meal plan for weight management:  A healthy meal plan includes a variety of foods, contains fewer calories, and helps you stay healthy  A healthy meal plan includes the following:     · Eat whole-grain foods more often  A healthy meal plan should contain fiber  Fiber is the part of grains, fruits, and vegetables that is not broken down by your body  Whole-grain foods are healthy and provide extra fiber in your diet  Some examples of whole-grain foods are whole-wheat breads and pastas, oatmeal, brown rice, and bulgur  · Eat a variety of vegetables every day  Include dark, leafy greens such as spinach, kale, martin greens, and mustard greens  Eat yellow and orange vegetables such as carrots, sweet potatoes, and winter squash  · Eat a variety of fruits every day  Choose fresh or canned fruit (canned in its own juice or light syrup) instead of juice  Fruit juice has very little or no fiber      · Eat low-fat dairy foods  Drink fat-free (skim) milk or 1% milk  Eat fat-free yogurt and low-fat cottage cheese  Try low-fat cheeses such as mozzarella and other reduced-fat cheeses  · Choose meat and other protein foods that are low in fat  Choose beans or other legumes such as split peas or lentils  Choose fish, skinless poultry (chicken or turkey), or lean cuts of red meat (beef or pork)  Before you cook meat or poultry, cut off any visible fat  · Use less fat and oil  Try baking foods instead of frying them  Add less fat, such as margarine, sour cream, regular salad dressing and mayonnaise to foods  Eat fewer high-fat foods  Some examples of high-fat foods include french fries, doughnuts, ice cream, and cakes  · Eat fewer sweets  Limit foods and drinks that are high in sugar  This includes candy, cookies, regular soda, and sweetened drinks  Ways to decrease calories:   · Eat smaller portions  ? Use a small plate with smaller servings  ? Do not eat second helpings  ? When you eat at a restaurant, ask for a box and place half of your meal in the box before you eat  ? Share an entrée with someone else  · Replace high-calorie snacks with healthy, low-calorie snacks  ? Choose fresh fruit, vegetables, fat-free rice cakes, or air-popped popcorn instead of potato chips, nuts, or chocolate  ? Choose water or calorie-free drinks instead of soda or sweetened drinks  · Do not shop for groceries when you are hungry  You may be more likely to make unhealthy food choices  Take a grocery list of healthy foods and shop after you have eaten  · Eat regular meals  Do not skip meals  Skipping meals can lead to overeating later in the day  This can make it harder for you to lose weight  Eat a healthy snack in place of a meal if you do not have time to eat a regular meal  Talk with a dietitian to help you create a meal plan and schedule that is right for you      Other things to consider as you try to lose weight:   · Be aware of situations that may give you the urge to overeat, such as eating while watching television  Find ways to avoid these situations  For example, read a book, go for a walk, or do crafts  · Meet with a weight loss support group or friends who are also trying to lose weight  This may help you stay motivated to continue working on your weight loss goals  © Copyright [employer] 2021 Information is for End User's use only and may not be sold, redistributed or otherwise used for commercial purposes  All illustrations and images included in CareNotes® are the copyrighted property of A D A M , Inc  or [facility] [employer]   The above information is an  only  It is not intended as medical advice for individual conditions or treatments  Talk to your doctor, nurse or pharmacist before following any medical regimen to see if it is safe and effective for you

## 2021-09-14 NOTE — PSYCH
History of Present Illness  [facility] Clinical Progress Note  Luke:   Specialized Services Documentation - Therapist must complete separate progress note for each specific clinical activity in which the client participated during the day  Case Management Note:   0800 Nelly Salcedo is excused due to IOP day off  Medications not changed/added/denied  Burke Cruz RN      Active Problems    1  Bulging lumbar disc (722 10) (M51 26)   2  Chronic tension-type headache (339 12) (G44 229)   3  Generalized anxiety disorder (300 02) (F41 1)   4  Grief reaction (309 0) (F43 20)   5  High risk medication use (V58 69) (Z79 899)   6  Hyperlipidemia (272 4) (E78 5)   7  Hypertension (401 9) (I10)   8  Insomnia (780 52) (G47 00)   9  Lateral epicondylitis of right elbow (726 32) (M77 11)   10  Major depressive disorder, recurrent, severe w/o psychotic behavior (296 33) (F33 2)   11  Myofascial pain syndrome (729 1) (M79 1)   12  Need for diphtheria-tetanus-pertussis (Tdap) vaccine (V06 1) (Z23)    Past Medical History    1  History of Herniated nucleus pulposus, L5-S1, right (722 10) (M51 27)   2  History of actinic keratosis (V13 3) (Z87 2)   3  History of low back pain (V13 59) (Z87 39)   4  Denied: History of seizure   5  Denied: History of traumatic injury of head    Allergies    1  No Known Drug Allergies    Current Meds   1  Abilify 5 MG Oral Tablet; Therapy: (Recorded:11Oct2017) to Recorded   2  ARIPiprazole 5 MG Oral Tablet; TAKE 1 TABLET DAILY; Therapy: 64WJD1309 to (Evaluate:10Nov2017)  Requested for: 27YOQ4253; Last   Rx:11Oct2017 Ordered   3  BusPIRone HCl - 15 MG Oral Tablet; Take 1 tablet daily; Therapy: 25IJO2537 to ([place])  Requested for: 02Oct2017; Last   Rx:02Oct2017 Ordered   4  Escitalopram Oxalate 10 MG Oral Tablet; Take 1 tablet daily; Therapy: 76DHU8448 to ([place])  Requested for: 02Oct2017; Last   Rx:02Oct2017 Ordered   5   Fenofibrate 145 MG Oral Tablet; Take 1 tablet daily; Therapy: 98Atf1097 to (Last Rx:40Ovu6025)  Requested for: 68Bwz0112 Ordered   6  Lisinopril 20 MG Oral Tablet; TAKE 1 TABLET DAILY FOR BLOOD PRESSURE; Therapy: 86JJS5974 to (Evaluate:13Nov2017)  Requested for: 20PCR8097; Last   Rx:03Qzr9536 Ordered   7  TraMADol HCl - 50 MG Oral Tablet; Take 1 tablet 4 times daily; Therapy: 01OXI3617 to ([place] [facility])  Requested for: 96DYS4447; Last   Rx:10Oct2017 Ordered   8  TraZODone HCl - 50 MG Oral Tablet; two tablets at bedtime  Requested for: 58VJZ9029;   Last Rx:92Wmc8886 Ordered   9  Zolpidem Tartrate 10 MG Oral Tablet; TAKE 1 TABLET AT BEDTIME; Therapy: 31FUZ0670 to (Evaluate:96Uaj6375); Last Rx:10Oct2017 Ordered    Family Psych History  Mother    1  Family history of hypertension (V17 49) (Z82 49)  Sister    2  Family history of Bipolar 1 disorder  Brother    3  Family history of CHD (coronary heart disease)  Family History    4  Family history of Hypertension (V17 49)   5  Family history of Thyroid Cancer    Social History    · Being A Social Drinker   · Daily caffeine consumption   · Inadequate exercise (V69 0) (Z72 3)   ·    · Never A Smoker   · Working Full Time    Future Appointments    Date/Time Provider Specialty Site   10/30/2017 12:00 PM ALEXX Madison  Psychiatry [facility]   11/01/2017 11:45 AM ALEXX Madison  Psychiatry [facility]   11/03/2017 12:00 PM Jose Juan Rodriges M D   Psychiatry [facility]   11/06/2017 05:45 PM Clarisa Dennis DO [facility]   11/10/2017 11:00 AM Clarisa Dennis DO [facility]     Signatures   Electronically signed by : Rashard Arshad RN; Oct 30 2017  8:27AM EST                       (Author) 36.6

## 2021-09-16 DIAGNOSIS — F99 INSOMNIA DUE TO OTHER MENTAL DISORDER: ICD-10-CM

## 2021-09-16 DIAGNOSIS — M51.26 BULGING LUMBAR DISC: ICD-10-CM

## 2021-09-16 DIAGNOSIS — E78.5 HYPERLIPIDEMIA, UNSPECIFIED HYPERLIPIDEMIA TYPE: ICD-10-CM

## 2021-09-16 DIAGNOSIS — F51.05 INSOMNIA DUE TO OTHER MENTAL DISORDER: ICD-10-CM

## 2021-09-16 DIAGNOSIS — M79.18 MYOFASCIAL PAIN SYNDROME: ICD-10-CM

## 2021-09-16 DIAGNOSIS — F41.1 GENERALIZED ANXIETY DISORDER: ICD-10-CM

## 2021-09-16 RX ORDER — TRAZODONE HYDROCHLORIDE 50 MG/1
100 TABLET ORAL
Qty: 180 TABLET | Refills: 0 | Status: SHIPPED | OUTPATIENT
Start: 2021-09-16 | End: 2021-12-14 | Stop reason: SDUPTHER

## 2021-09-16 RX ORDER — FENOFIBRATE 145 MG/1
145 TABLET, COATED ORAL DAILY
Qty: 90 TABLET | Refills: 0 | Status: SHIPPED | OUTPATIENT
Start: 2021-09-16 | End: 2022-02-13 | Stop reason: SDUPTHER

## 2021-09-17 RX ORDER — LORAZEPAM 0.5 MG/1
TABLET ORAL
Qty: 60 TABLET | Refills: 0 | Status: SHIPPED | OUTPATIENT
Start: 2021-09-17 | End: 2021-10-17 | Stop reason: SDUPTHER

## 2021-09-17 RX ORDER — TRAMADOL HYDROCHLORIDE 50 MG/1
TABLET ORAL
Qty: 180 TABLET | Refills: 0 | Status: SHIPPED | OUTPATIENT
Start: 2021-09-17 | End: 2021-10-17 | Stop reason: SDUPTHER

## 2021-10-17 DIAGNOSIS — M51.26 BULGING LUMBAR DISC: ICD-10-CM

## 2021-10-17 DIAGNOSIS — M79.18 MYOFASCIAL PAIN SYNDROME: ICD-10-CM

## 2021-10-17 DIAGNOSIS — F51.05 INSOMNIA DUE TO OTHER MENTAL DISORDER: ICD-10-CM

## 2021-10-17 DIAGNOSIS — F99 INSOMNIA DUE TO OTHER MENTAL DISORDER: ICD-10-CM

## 2021-10-17 DIAGNOSIS — F41.1 GENERALIZED ANXIETY DISORDER: ICD-10-CM

## 2021-10-18 RX ORDER — LORAZEPAM 0.5 MG/1
TABLET ORAL
Qty: 60 TABLET | Refills: 0 | Status: SHIPPED | OUTPATIENT
Start: 2021-10-18 | End: 2021-11-15 | Stop reason: SDUPTHER

## 2021-10-18 RX ORDER — TRAMADOL HYDROCHLORIDE 50 MG/1
TABLET ORAL
Qty: 180 TABLET | Refills: 0 | Status: SHIPPED | OUTPATIENT
Start: 2021-10-18 | End: 2021-11-15

## 2021-11-14 DIAGNOSIS — F33.2 SEVERE RECURRENT MAJOR DEPRESSION WITHOUT PSYCHOTIC FEATURES (HCC): ICD-10-CM

## 2021-11-14 DIAGNOSIS — R20.0 NUMBNESS: ICD-10-CM

## 2021-11-14 DIAGNOSIS — M51.26 BULGING LUMBAR DISC: ICD-10-CM

## 2021-11-14 DIAGNOSIS — M79.18 MYOFASCIAL PAIN SYNDROME: ICD-10-CM

## 2021-11-15 DIAGNOSIS — F51.05 INSOMNIA DUE TO OTHER MENTAL DISORDER: ICD-10-CM

## 2021-11-15 DIAGNOSIS — F99 INSOMNIA DUE TO OTHER MENTAL DISORDER: ICD-10-CM

## 2021-11-15 DIAGNOSIS — F41.1 GENERALIZED ANXIETY DISORDER: ICD-10-CM

## 2021-11-15 RX ORDER — BUPROPION HYDROCHLORIDE 75 MG/1
TABLET ORAL
Qty: 180 TABLET | Refills: 1 | Status: SHIPPED | OUTPATIENT
Start: 2021-11-15 | End: 2022-02-13 | Stop reason: SDUPTHER

## 2021-11-15 RX ORDER — TRAMADOL HYDROCHLORIDE 50 MG/1
TABLET ORAL
Qty: 180 TABLET | Refills: 0 | Status: SHIPPED | OUTPATIENT
Start: 2021-11-15 | End: 2021-12-14 | Stop reason: SDUPTHER

## 2021-11-15 RX ORDER — LORAZEPAM 0.5 MG/1
TABLET ORAL
Qty: 60 TABLET | Refills: 0 | Status: SHIPPED | OUTPATIENT
Start: 2021-11-15 | End: 2022-01-13 | Stop reason: SDUPTHER

## 2021-11-15 RX ORDER — GABAPENTIN 300 MG/1
CAPSULE ORAL
Qty: 180 CAPSULE | Refills: 1 | Status: SHIPPED | OUTPATIENT
Start: 2021-11-15 | End: 2022-02-13 | Stop reason: SDUPTHER

## 2021-12-14 DIAGNOSIS — M51.26 BULGING LUMBAR DISC: ICD-10-CM

## 2021-12-14 DIAGNOSIS — F51.05 INSOMNIA DUE TO OTHER MENTAL DISORDER: ICD-10-CM

## 2021-12-14 DIAGNOSIS — F99 INSOMNIA DUE TO OTHER MENTAL DISORDER: ICD-10-CM

## 2021-12-14 DIAGNOSIS — M79.18 MYOFASCIAL PAIN SYNDROME: ICD-10-CM

## 2021-12-15 RX ORDER — TRAMADOL HYDROCHLORIDE 50 MG/1
TABLET ORAL
Qty: 180 TABLET | Refills: 0 | Status: SHIPPED | OUTPATIENT
Start: 2021-12-15 | End: 2022-01-13 | Stop reason: SDUPTHER

## 2021-12-15 RX ORDER — TRAZODONE HYDROCHLORIDE 50 MG/1
100 TABLET ORAL
Qty: 180 TABLET | Refills: 0 | Status: SHIPPED | OUTPATIENT
Start: 2021-12-15 | End: 2022-02-13 | Stop reason: SDUPTHER

## 2022-01-13 DIAGNOSIS — F41.1 GENERALIZED ANXIETY DISORDER: ICD-10-CM

## 2022-01-13 DIAGNOSIS — M51.26 BULGING LUMBAR DISC: ICD-10-CM

## 2022-01-13 DIAGNOSIS — F51.05 INSOMNIA DUE TO OTHER MENTAL DISORDER: ICD-10-CM

## 2022-01-13 DIAGNOSIS — F99 INSOMNIA DUE TO OTHER MENTAL DISORDER: ICD-10-CM

## 2022-01-13 DIAGNOSIS — M79.18 MYOFASCIAL PAIN SYNDROME: ICD-10-CM

## 2022-01-14 RX ORDER — TRAMADOL HYDROCHLORIDE 50 MG/1
TABLET ORAL
Qty: 180 TABLET | Refills: 0 | Status: SHIPPED | OUTPATIENT
Start: 2022-01-14 | End: 2022-02-13 | Stop reason: SDUPTHER

## 2022-01-14 RX ORDER — LORAZEPAM 0.5 MG/1
TABLET ORAL
Qty: 60 TABLET | Refills: 0 | Status: SHIPPED | OUTPATIENT
Start: 2022-01-14 | End: 2022-02-13 | Stop reason: SDUPTHER

## 2022-02-24 ENCOUNTER — OFFICE VISIT (OUTPATIENT)
Dept: FAMILY MEDICINE CLINIC | Facility: CLINIC | Age: 47
End: 2022-02-24
Payer: COMMERCIAL

## 2022-02-24 VITALS
HEART RATE: 90 BPM | BODY MASS INDEX: 35.86 KG/M2 | TEMPERATURE: 97.4 F | WEIGHT: 288.4 LBS | RESPIRATION RATE: 16 BRPM | DIASTOLIC BLOOD PRESSURE: 82 MMHG | SYSTOLIC BLOOD PRESSURE: 122 MMHG | OXYGEN SATURATION: 98 % | HEIGHT: 75 IN

## 2022-02-24 DIAGNOSIS — I10 PRIMARY HYPERTENSION: Primary | ICD-10-CM

## 2022-02-24 DIAGNOSIS — Z79.899 HIGH RISK MEDICATION USE: ICD-10-CM

## 2022-02-24 DIAGNOSIS — F33.2 SEVERE RECURRENT MAJOR DEPRESSION WITHOUT PSYCHOTIC FEATURES (HCC): ICD-10-CM

## 2022-02-24 DIAGNOSIS — E78.5 HYPERLIPIDEMIA, UNSPECIFIED HYPERLIPIDEMIA TYPE: ICD-10-CM

## 2022-02-24 PROCEDURE — 3008F BODY MASS INDEX DOCD: CPT | Performed by: FAMILY MEDICINE

## 2022-02-24 PROCEDURE — 99214 OFFICE O/P EST MOD 30 MIN: CPT | Performed by: FAMILY MEDICINE

## 2022-02-24 PROCEDURE — 1036F TOBACCO NON-USER: CPT | Performed by: FAMILY MEDICINE

## 2022-02-24 NOTE — PATIENT INSTRUCTIONS
Low Fat Diet   AMBULATORY CARE:   A low-fat diet  is an eating plan that is low in total fat, unhealthy fat, and cholesterol  You may need to follow a low-fat diet if you have trouble digesting or absorbing fat  You may also need to follow this diet if you have high cholesterol  You can also lower your cholesterol by increasing the amount of fiber in your diet  Soluble fiber is a type of fiber that helps to decrease cholesterol levels  Different types of fat in food:   · Limit unhealthy fats  A diet that is high in cholesterol, saturated fat, and trans fat may cause unhealthy cholesterol levels  Unhealthy cholesterol levels increase your risk of heart disease  ? Cholesterol:  Limit intake of cholesterol to less than 200 mg per day  Cholesterol is found in meat, eggs, and dairy  ? Saturated fat:  Limit saturated fat to less than 7% of your total daily calories  Ask your dietitian how many calories you need each day  Saturated fat is found in butter, cheese, ice cream, whole milk, and palm oil  Saturated fat is also found in meat, such as beef, pork, chicken skin, and processed meats  Processed meats include sausage, hot dogs, and bologna  ? Trans fat:  Avoid trans fat as much as possible  Trans fat is used in fried and baked foods  Foods that say trans fat free on the label may still have up to 0 5 grams of trans fat per serving  · Include healthy fats  Replace foods that are high in saturated and trans fat with foods high in healthy fats  This may help to decrease high cholesterol levels  ? Monounsaturated fats: These are found in avocados, nuts, and vegetable oils, such as olive, canola, and sunflower oil  ? Polyunsaturated fats: These can be found in vegetable oils, such as soybean or corn oil  Omega-3 fats can help to decrease the risk of heart disease  Omega-3 fats are found in fish, such as salmon, herring, trout, and tuna   Omega-3 fats can also be found in plant foods, such as walnuts, flaxseed, soybeans, and canola oil  Foods to limit or avoid:   · Grains:      ? Snacks that are made with partially hydrogenated oils, such as chips, regular crackers, and butter-flavored popcorn    ? High-fat baked goods, such as biscuits, croissants, doughnuts, pies, cookies, and pastries    · Dairy:      ? Whole milk, 2% milk, and yogurt and ice cream made with whole milk    ? Half and half creamer, heavy cream, and whipping cream    ? Cheese, cream cheese, and sour cream    · Meats and proteins:      ? High-fat cuts of meat (T-bone steak, regular hamburger, and ribs)    ? Fried meat, poultry (turkey and chicken), and fish    ? Poultry (chicken and turkey) with skin    ? Cold cuts (salami or bologna), hot dogs, gregorio, and sausage    ? Whole eggs and egg yolks    · Vegetables and fruits with added fat:      ? Fried vegetables or vegetables in butter or high-fat sauces, such as cream or cheese sauces    ? Fried fruit or fruit served with butter or cream    · Fats:      ? Butter, stick margarine, and shortening    ? Coconut, palm oil, and palm kernel oil    Foods to include:   · Grains:      ? Whole-grain breads, cereals, pasta, and brown rice    ? Low-fat crackers and pretzels    · Vegetables and fruits:      ? Fresh, frozen, or canned vegetables (no salt or low-sodium)    ? Fresh, frozen, dried, or canned fruit (canned in light syrup or fruit juice)    ? Avocado    · Low-fat dairy products:      ? Nonfat (skim) or 1% milk    ? Nonfat or low-fat cheese, yogurt, and cottage cheese    · Meats and proteins:      ? Chicken or turkey with no skin    ? Baked or broiled fish    ? Lean beef and pork (loin, round, extra lean hamburger)    ? Beans and peas, unsalted nuts, soy products    ? Egg whites and substitutes    ? Seeds and nuts    · Fats:      ? Unsaturated oil, such as canola, olive, peanut, soybean, or sunflower oil    ? Soft or liquid margarine and vegetable oil spread    ?  Low-fat salad dressing    Other ways to decrease fat:   · Read food labels before you buy foods  Choose foods that have less than 30% of calories from fat  Choose low-fat or fat-free dairy products  Remember that fat free does not mean calorie free  These foods still contain calories, and too many calories can lead to weight gain  · Trim fat from meat and avoid fried food  Trim all visible fat from meat before you cook it  Remove the skin from poultry  Do not cook meat, fish, or poultry  Bake, roast, boil, or broil these foods instead  Avoid fried foods  Eat a baked potato instead of [place] fries  Steam vegetables instead of sautéing them in butter  · Add less fat to foods  Use imitation gregorio bits on salads and baked potatoes instead of regular gregorio bits  Use fat-free or low-fat salad dressings instead of regular dressings  Use low-fat or nonfat butter-flavored topping instead of regular butter or margarine on popcorn and other foods  Ways to decrease fat in recipes:  Replace high-fat ingredients with low-fat or nonfat ones  This may cause baked goods to be drier than usual  You may need to use nonfat cooking spray on pans to prevent food from sticking  You also may need to change the amount of other ingredients, such as water, in the recipe  Try the following:  · Use low-fat or light margarine instead of regular margarine or shortening  · Use lean ground turkey breast or chicken, or lean ground beef (less than 5% fat) instead of hamburger  · Add 1 teaspoon of canola oil to 8 ounces of skim milk instead of using cream or half and half  · Use grated zucchini, carrots, or apples in breads instead of coconut  · Use blenderized, low-fat cottage cheese, plain tofu, or low-fat ricotta cheese instead of cream cheese  · Use 1 egg white and 1 teaspoon of canola oil, or use ¼ cup (2 ounces) of fat-free egg substitute instead of a whole egg       · Replace half of the oil that is called for in a recipe with applesauce when you bake  Use 3 tablespoons of cocoa powder and 1 tablespoon of canola oil instead of a square of baking chocolate  How to increase fiber:  Eat enough high-fiber foods to get 20 to 30 grams of fiber every day  Slowly increase your fiber intake to avoid stomach cramps, gas, and other problems  · Eat 3 ounces of whole-grain foods each day  An ounce is about 1 slice of bread  Eat whole-grain breads, such as whole-wheat bread  Whole wheat, whole-wheat flour, or other whole grains should be listed as the first ingredient on the food label  Replace white flour with whole-grain flour or use half of each in recipes  Whole-grain flour is heavier than white flour, so you may have to add more yeast or baking powder  · Eat a high-fiber cereal for breakfast   Oatmeal is a good source of soluble fiber  Look for cereals that have bran or fiber in the name  Choose whole-grain products, such as brown rice, barley, and whole-wheat pasta  · Eat more beans, peas, and lentils  For example, add beans to soups or salads  Eat at least 5 cups of fruits and vegetables each day  Eat fruits and vegetables with the peel because the peel is high in fiber  © Copyright [employer] 2021 Information is for End User's use only and may not be sold, redistributed or otherwise used for commercial purposes  All illustrations and images included in CareNotes® are the copyrighted property of A D A M , Inc  or [address] [place], [place] [address]christian   The above information is an  only  It is not intended as medical advice for individual conditions or treatments  Talk to your doctor, nurse or pharmacist before following any medical regimen to see if it is safe and effective for you  Heart Healthy Diet   AMBULATORY CARE:   A heart healthy diet  is an eating plan low in unhealthy fats and sodium (salt)  The plan is high in healthy fats and fiber   A heart healthy diet helps improve your cholesterol levels and lowers your risk for heart disease and stroke  A dietitian will teach you how to read and understand food labels  Heart healthy diet guidelines to follow:   · Choose foods that contain healthy fats  ? Unsaturated fats  include monounsaturated and polyunsaturated fats  Unsaturated fat is found in foods such as soybean, canola, olive, corn, and safflower oils  It is also found in soft tub margarine that is made with liquid vegetable oil  ? Omega-3 fat  is found in certain fish, such as salmon, tuna, and trout, and in walnuts and flaxseed  Eat fish high in omega-3 fats at least 2 times a week  · Get 20 to 30 grams of fiber each day  Fruits, vegetables, whole-grain foods, and legumes (cooked beans) are good sources of fiber  · Limit or do not have unhealthy fats  ? Cholesterol  is found in animal foods, such as eggs and lobster, and in dairy products made from whole milk  Limit cholesterol to less than 200 mg each day  ? Saturated fat  is found in meats, such as gregorio and hamburger  It is also found in chicken or turkey skin, whole milk, and butter  Limit saturated fat to less than 7% of your total daily calories  ? Trans fat  is found in packaged foods, such as potato chips and cookies  It is also in hard margarine, some fried foods, and shortening  Do not eat foods that contain trans fats  · Limit sodium as directed  You may be told to limit sodium to 2,000 to 2,300 mg each day  Choose low-sodium or no-salt-added foods  Add little or no salt to food you prepare  Use herbs and spices in place of salt  Include the following in your heart healthy plan:  Ask your dietitian or healthcare provider how many servings to have from each of the following food groups:  · Grains:      ? Whole-wheat breads, cereals, and pastas, and brown rice    ? Low-fat, low-sodium crackers and chips    · Vegetables:      ? Broccoli, green beans, green peas, and spinach    ? Collards, kale, and lima beans    ?  Carrots, sweet potatoes, tomatoes, and peppers    ? Canned vegetables with no salt added    · Fruits:      ? Bananas, peaches, pears, and pineapple    ? Grapes, raisins, and dates    ? Oranges, tangerines, grapefruit, orange juice, and grapefruit juice    ? Apricots, mangoes, melons, and papaya    ? Raspberries and strawberries    ? Canned fruit with no added sugar    · Low-fat dairy:      ? Nonfat (skim) milk, 1% milk, and low-fat almond, cashew, or soy milks fortified with calcium    ? Low-fat cheese, regular or frozen yogurt, and cottage cheese    · Meats and proteins:      ? Lean cuts of beef and pork (loin, leg, round), skinless chicken and turkey    ? Legumes, soy products, egg whites, or nuts    Limit or do not include the following in your heart healthy plan:   · Unhealthy fats and oils:      ? Whole or 2% milk, cream cheese, sour cream, or cheese    ? High-fat cuts of beef (T-bone steaks, ribs), chicken or turkey with skin, and organ meats such as liver    ? Butter, stick margarine, shortening, and cooking oils such as coconut or palm oil    · Foods and liquids high in sodium:      ? Packaged foods, such as frozen dinners, cookies, macaroni and cheese, and cereals with more than 300 mg of sodium per serving    ? Vegetables with added sodium, such as instant potatoes, vegetables with added sauces, or regular canned vegetables    ? Cured or smoked meats, such as hot dogs, gregorio, and sausage    ? High-sodium ketchup, barbecue sauce, salad dressing, pickles, olives, soy sauce, or miso    · Foods and liquids high in sugar:      ? Candy, cake, cookies, pies, or doughnuts    ? Soft drinks (soda), sports drinks, or sweetened tea    ? Canned or dry mixes for cakes, soups, sauces, or gravies    Other healthy heart guidelines:   · Do not smoke  Nicotine and other chemicals in cigarettes and cigars can cause lung and heart damage  Ask your healthcare provider for information if you currently smoke and need help to quit  E-cigarettes or smokeless tobacco still contain nicotine  Talk to your healthcare provider before you use these products  · Limit or do not drink alcohol as directed  Alcohol can damage your heart and raise your blood pressure  Your healthcare provider may give you specific daily and weekly limits  The general recommended limit is 1 drink a day for women 21 or older and for men 72 or older  Do not have more than 3 drinks in a day or 7 in a week  The recommended limit is 2 drinks a day for men 24to 59years of age  Do not have more than 4 drinks in a day or 14 in a week  A drink of alcohol is 12 ounces of beer, 5 ounces of wine, or 1½ ounces of liquor  · Exercise regularly  Exercise can help you maintain a healthy weight and improve your blood pressure and cholesterol levels  Regular exercise can also decrease your risk for heart problems  Ask your healthcare provider about the best exercise plan for you  Do not start an exercise program without asking your healthcare provider  Follow up with your doctor or cardiologist as directed:  Write down your questions so you remember to ask them during your visits  © Copyright [employer] 2021 Information is for End User's use only and may not be sold, redistributed or otherwise used for commercial purposes  All illustrations and images included in CareNotes® are the copyrighted property of A D A M , Inc  or [facility] [employer] [employer]   The above information is an  only  It is not intended as medical advice for individual conditions or treatments  Talk to your doctor, nurse or pharmacist before following any medical regimen to see if it is safe and effective for you

## 2022-02-24 NOTE — PROGRESS NOTES
Assessment/Plan:     Diagnoses and all orders for this visit:    Primary hypertension    Severe recurrent major depression without psychotic features ([facility] [address] )    Hyperlipidemia, unspecified hyperlipidemia type  -     Lipid panel; Future    High risk medication use  -     Comprehensive metabolic panel; Future       Patient overall is doing well   His meds are stable   Will recheck blood work is his triglycerides are chronically very elevated   Otherwise he will continue his medications he is up-to-date on health maintenance and he can follow-up in 6 months or sooner if needed      Subjective:     Chief Complaint   Patient presents with    Follow-up     Check up 6 months Depression        Patient ID: Whitney Alpers is a 48 y o  male  Patient presents today for six-month checkup chronic conditions  Reports no acute complaints today and he is stable and at baseline      The following portions of the patient's history were reviewed and updated as appropriate: allergies, current medications, past family history, past medical history, past social history, past surgical history and problem list     Review of Systems   Constitutional: Negative  HENT: Negative  Eyes: Negative  Respiratory: Negative  Cardiovascular: Negative  Gastrointestinal: Negative  Endocrine: Negative  Genitourinary: Negative  Musculoskeletal: Negative  Skin: Negative  Allergic/Immunologic: Negative  Neurological: Negative  Hematological: Negative  Psychiatric/Behavioral: Negative  All other systems reviewed and are negative  Objective:    Vitals:    02/24/22 0758   BP: 122/82   BP Location: Left arm   Patient Position: Sitting   Cuff Size: Large   Pulse: 90   Resp: 16   Temp: (!) 97 4 °F (36 3 °C)   TempSrc: Tympanic   SpO2: 98%   Weight: 131 kg (288 lb 6 4 oz)   Height: 6' 2 5" (1 892 m)          Physical Exam  Vitals and nursing note reviewed     Constitutional:       General: He is not in acute distress  Appearance: He is well-developed  HENT:      Head: Normocephalic  Right Ear: External ear normal       Left Ear: External ear normal       Nose: Nose normal    Eyes:      General:         Right eye: No discharge  Left eye: No discharge  Conjunctiva/sclera: Conjunctivae normal       Pupils: Pupils are equal, round, and reactive to light  Cardiovascular:      Rate and Rhythm: Normal rate and regular rhythm  Heart sounds: Normal heart sounds  Pulmonary:      Effort: Pulmonary effort is normal       Breath sounds: Normal breath sounds  Abdominal:      General: Bowel sounds are normal  There is no distension  Palpations: Abdomen is soft  Tenderness: There is no abdominal tenderness  Musculoskeletal:         General: Normal range of motion  Cervical back: Normal range of motion  Skin:     General: Skin is warm and dry  Findings: No rash  Neurological:      Mental Status: He is alert and oriented to person, place, and time  Cranial Nerves: No cranial nerve deficit  Psychiatric:         Behavior: Behavior normal          Thought Content: Thought content normal          Judgment: Judgment normal          BMI Counseling: Body mass index is 36 53 kg/m²  The BMI is above normal  Nutrition recommendations include reducing portion sizes, decreasing overall calorie intake, 3-5 servings of fruits/vegetables daily, reducing fast food intake, consuming healthier snacks, decreasing soda and/or juice intake, moderation in carbohydrate intake, increasing intake of lean protein, reducing intake of saturated fat and trans fat and reducing intake of cholesterol  Exercise recommendations include exercising 3-5 times per week

## 2022-03-14 DIAGNOSIS — M79.18 MYOFASCIAL PAIN SYNDROME: ICD-10-CM

## 2022-03-14 DIAGNOSIS — M51.26 BULGING LUMBAR DISC: ICD-10-CM

## 2022-03-14 DIAGNOSIS — F99 INSOMNIA DUE TO OTHER MENTAL DISORDER: ICD-10-CM

## 2022-03-14 DIAGNOSIS — F51.05 INSOMNIA DUE TO OTHER MENTAL DISORDER: ICD-10-CM

## 2022-03-14 DIAGNOSIS — F41.1 GENERALIZED ANXIETY DISORDER: ICD-10-CM

## 2022-03-14 RX ORDER — LORAZEPAM 0.5 MG/1
TABLET ORAL
Qty: 60 TABLET | Refills: 0 | Status: SHIPPED | OUTPATIENT
Start: 2022-03-14 | End: 2022-04-11 | Stop reason: SDUPTHER

## 2022-03-14 RX ORDER — TRAMADOL HYDROCHLORIDE 50 MG/1
TABLET ORAL
Qty: 180 TABLET | Refills: 0 | Status: SHIPPED | OUTPATIENT
Start: 2022-03-14 | End: 2022-04-11 | Stop reason: SDUPTHER

## 2022-04-11 DIAGNOSIS — M51.26 BULGING LUMBAR DISC: ICD-10-CM

## 2022-04-11 DIAGNOSIS — F41.1 GENERALIZED ANXIETY DISORDER: ICD-10-CM

## 2022-04-11 DIAGNOSIS — F99 INSOMNIA DUE TO OTHER MENTAL DISORDER: ICD-10-CM

## 2022-04-11 DIAGNOSIS — M79.18 MYOFASCIAL PAIN SYNDROME: ICD-10-CM

## 2022-04-11 DIAGNOSIS — F51.05 INSOMNIA DUE TO OTHER MENTAL DISORDER: ICD-10-CM

## 2022-04-11 RX ORDER — LORAZEPAM 0.5 MG/1
TABLET ORAL
Qty: 60 TABLET | Refills: 1 | Status: SHIPPED | OUTPATIENT
Start: 2022-04-11 | End: 2022-05-16 | Stop reason: SDUPTHER

## 2022-04-11 RX ORDER — TRAMADOL HYDROCHLORIDE 50 MG/1
TABLET ORAL
Qty: 180 TABLET | Refills: 0 | Status: SHIPPED | OUTPATIENT
Start: 2022-04-11 | End: 2022-04-11 | Stop reason: SDUPTHER

## 2022-04-11 RX ORDER — TRAMADOL HYDROCHLORIDE 50 MG/1
TABLET ORAL
Qty: 180 TABLET | Refills: 0 | Status: SHIPPED | OUTPATIENT
Start: 2022-04-11 | End: 2022-05-16 | Stop reason: SDUPTHER

## 2022-04-11 RX ORDER — LORAZEPAM 0.5 MG/1
TABLET ORAL
Qty: 60 TABLET | Refills: 0 | Status: SHIPPED | OUTPATIENT
Start: 2022-04-11 | End: 2022-04-11 | Stop reason: SDUPTHER

## 2022-05-16 DIAGNOSIS — M51.26 BULGING LUMBAR DISC: ICD-10-CM

## 2022-05-16 DIAGNOSIS — F51.05 INSOMNIA DUE TO OTHER MENTAL DISORDER: ICD-10-CM

## 2022-05-16 DIAGNOSIS — M79.18 MYOFASCIAL PAIN SYNDROME: ICD-10-CM

## 2022-05-16 DIAGNOSIS — F41.1 GENERALIZED ANXIETY DISORDER: ICD-10-CM

## 2022-05-16 DIAGNOSIS — F99 INSOMNIA DUE TO OTHER MENTAL DISORDER: ICD-10-CM

## 2022-05-16 RX ORDER — TRAMADOL HYDROCHLORIDE 50 MG/1
TABLET ORAL
Qty: 180 TABLET | Refills: 0 | Status: SHIPPED | OUTPATIENT
Start: 2022-05-16 | End: 2022-06-16 | Stop reason: SDUPTHER

## 2022-05-16 RX ORDER — LORAZEPAM 0.5 MG/1
TABLET ORAL
Qty: 60 TABLET | Refills: 0 | Status: SHIPPED | OUTPATIENT
Start: 2022-05-16 | End: 2022-06-17 | Stop reason: SDUPTHER

## 2022-05-19 DIAGNOSIS — R20.0 NUMBNESS: ICD-10-CM

## 2022-05-20 RX ORDER — GABAPENTIN 300 MG/1
300 CAPSULE ORAL 2 TIMES DAILY
Qty: 180 CAPSULE | Refills: 0 | Status: SHIPPED | OUTPATIENT
Start: 2022-05-20 | End: 2022-07-13 | Stop reason: SDUPTHER

## 2022-06-11 DIAGNOSIS — F33.2 SEVERE RECURRENT MAJOR DEPRESSION WITHOUT PSYCHOTIC FEATURES (HCC): ICD-10-CM

## 2022-06-13 RX ORDER — ARIPIPRAZOLE 5 MG/1
TABLET ORAL
Qty: 90 TABLET | Refills: 1 | Status: SHIPPED | OUTPATIENT
Start: 2022-06-13

## 2022-06-16 DIAGNOSIS — M51.26 BULGING LUMBAR DISC: ICD-10-CM

## 2022-06-16 DIAGNOSIS — M79.18 MYOFASCIAL PAIN SYNDROME: ICD-10-CM

## 2022-06-17 DIAGNOSIS — F41.1 GENERALIZED ANXIETY DISORDER: ICD-10-CM

## 2022-06-17 DIAGNOSIS — F99 INSOMNIA DUE TO OTHER MENTAL DISORDER: ICD-10-CM

## 2022-06-17 DIAGNOSIS — F51.05 INSOMNIA DUE TO OTHER MENTAL DISORDER: ICD-10-CM

## 2022-06-17 RX ORDER — TRAMADOL HYDROCHLORIDE 50 MG/1
TABLET ORAL
Qty: 180 TABLET | Refills: 0 | Status: SHIPPED | OUTPATIENT
Start: 2022-06-17 | End: 2022-07-13 | Stop reason: SDUPTHER

## 2022-06-17 RX ORDER — LORAZEPAM 0.5 MG/1
TABLET ORAL
Qty: 60 TABLET | Refills: 0 | Status: SHIPPED | OUTPATIENT
Start: 2022-06-17 | End: 2022-07-13 | Stop reason: SDUPTHER

## 2022-07-13 DIAGNOSIS — F99 INSOMNIA DUE TO OTHER MENTAL DISORDER: ICD-10-CM

## 2022-07-13 DIAGNOSIS — F41.1 GENERALIZED ANXIETY DISORDER: ICD-10-CM

## 2022-07-13 DIAGNOSIS — F51.05 INSOMNIA DUE TO OTHER MENTAL DISORDER: ICD-10-CM

## 2022-07-13 DIAGNOSIS — R20.0 NUMBNESS: ICD-10-CM

## 2022-07-13 DIAGNOSIS — M79.18 MYOFASCIAL PAIN SYNDROME: ICD-10-CM

## 2022-07-13 DIAGNOSIS — M51.36 BULGING LUMBAR DISC: ICD-10-CM

## 2022-07-13 DIAGNOSIS — F33.2 SEVERE RECURRENT MAJOR DEPRESSION WITHOUT PSYCHOTIC FEATURES (HCC): ICD-10-CM

## 2022-07-13 DIAGNOSIS — E78.5 HYPERLIPIDEMIA, UNSPECIFIED HYPERLIPIDEMIA TYPE: ICD-10-CM

## 2022-07-13 DIAGNOSIS — I10 ESSENTIAL HYPERTENSION: ICD-10-CM

## 2022-07-14 RX ORDER — ESCITALOPRAM OXALATE 10 MG/1
10 TABLET ORAL DAILY
Qty: 90 TABLET | Refills: 0 | Status: SHIPPED | OUTPATIENT
Start: 2022-07-14 | End: 2022-10-11

## 2022-07-14 RX ORDER — FENOFIBRATE 145 MG/1
145 TABLET, COATED ORAL DAILY
Qty: 90 TABLET | Refills: 0 | Status: SHIPPED | OUTPATIENT
Start: 2022-07-14 | End: 2022-09-04 | Stop reason: SDUPTHER

## 2022-07-14 RX ORDER — TRAMADOL HYDROCHLORIDE 50 MG/1
TABLET ORAL
Qty: 180 TABLET | Refills: 0 | Status: SHIPPED | OUTPATIENT
Start: 2022-07-14 | End: 2022-08-16 | Stop reason: SDUPTHER

## 2022-07-14 RX ORDER — BUPROPION HYDROCHLORIDE 75 MG/1
75 TABLET ORAL 2 TIMES DAILY
Qty: 180 TABLET | Refills: 0 | Status: SHIPPED | OUTPATIENT
Start: 2022-07-14 | End: 2022-08-16 | Stop reason: SDUPTHER

## 2022-07-14 RX ORDER — LISINOPRIL 20 MG/1
20 TABLET ORAL DAILY
Qty: 90 TABLET | Refills: 0 | Status: SHIPPED | OUTPATIENT
Start: 2022-07-14 | End: 2022-10-12

## 2022-07-14 RX ORDER — LORAZEPAM 0.5 MG/1
TABLET ORAL
Qty: 60 TABLET | Refills: 0 | Status: SHIPPED | OUTPATIENT
Start: 2022-07-14 | End: 2022-08-16 | Stop reason: SDUPTHER

## 2022-07-14 RX ORDER — GABAPENTIN 300 MG/1
300 CAPSULE ORAL 2 TIMES DAILY
Qty: 180 CAPSULE | Refills: 0 | Status: SHIPPED | OUTPATIENT
Start: 2022-07-14 | End: 2022-08-16 | Stop reason: SDUPTHER

## 2022-07-14 RX ORDER — TRAZODONE HYDROCHLORIDE 50 MG/1
100 TABLET ORAL
Qty: 180 TABLET | Refills: 0 | Status: SHIPPED | OUTPATIENT
Start: 2022-07-14 | End: 2022-10-11

## 2022-08-30 ENCOUNTER — OFFICE VISIT (OUTPATIENT)
Dept: FAMILY MEDICINE CLINIC | Facility: CLINIC | Age: 47
End: 2022-08-30

## 2022-08-30 VITALS
DIASTOLIC BLOOD PRESSURE: 86 MMHG | RESPIRATION RATE: 16 BRPM | HEART RATE: 83 BPM | TEMPERATURE: 97.9 F | HEIGHT: 75 IN | WEIGHT: 298.8 LBS | BODY MASS INDEX: 37.15 KG/M2 | OXYGEN SATURATION: 99 % | SYSTOLIC BLOOD PRESSURE: 138 MMHG

## 2022-08-30 DIAGNOSIS — I10 PRIMARY HYPERTENSION: ICD-10-CM

## 2022-08-30 DIAGNOSIS — F33.2 SEVERE RECURRENT MAJOR DEPRESSION WITHOUT PSYCHOTIC FEATURES (HCC): Primary | ICD-10-CM

## 2022-08-30 DIAGNOSIS — F41.1 GENERALIZED ANXIETY DISORDER: ICD-10-CM

## 2022-08-30 PROCEDURE — 99213 OFFICE O/P EST LOW 20 MIN: CPT | Performed by: FAMILY MEDICINE

## 2022-08-30 NOTE — PATIENT INSTRUCTIONS

## 2022-08-30 NOTE — PROGRESS NOTES
Assessment/Plan:       Diagnoses and all orders for this visit:    Severe recurrent major depression without psychotic features ([address] )    Generalized anxiety disorder    Primary hypertension      patient overall stable   Blood pressure is controlled  Will continue his current medications   His pain levels controlled his anxiety depression has been stable   He can follow up in 6 months or sooner if need  Due to lack of insurance at this time will hold off on any health maintenance testing and will consider at next visit      Subjective:     Chief Complaint   Patient presents with    6 month follow-up        Patient ID: Dominic Restrepo is a 50 y o  male  Patient here for six-month checkup chronic conditions   He reports no acute complaints today and is stable      The following portions of the patient's history were reviewed and updated as appropriate: allergies, current medications, past family history, past medical history, past social history, past surgical history and problem list     Review of Systems   Constitutional: Negative  HENT: Negative  Eyes: Negative  Respiratory: Negative  Cardiovascular: Negative  Gastrointestinal: Negative  Endocrine: Negative  Genitourinary: Negative  Musculoskeletal: Negative  Skin: Negative  Allergic/Immunologic: Negative  Neurological: Negative  Hematological: Negative  Psychiatric/Behavioral: Negative  All other systems reviewed and are negative  Objective:    Vitals:    08/30/22 0822   BP: 138/86   BP Location: Left arm   Patient Position: Sitting   Cuff Size: Large   Pulse: 83   Resp: 16   Temp: 97 9 °F (36 6 °C)   TempSrc: Tympanic   SpO2: 99%   Weight: 136 kg (298 lb 12 8 oz)   Height: 6' 2 5" (1 892 m)          Physical Exam  Vitals and nursing note reviewed  Constitutional:       General: He is not in acute distress  Appearance: He is well-developed  HENT:      Head: Normocephalic        Right Ear: External ear normal       Left Ear: External ear normal       Nose: Nose normal    Eyes:      General:         Right eye: No discharge  Left eye: No discharge  Conjunctiva/sclera: Conjunctivae normal       Pupils: Pupils are equal, round, and reactive to light  Cardiovascular:      Rate and Rhythm: Normal rate and regular rhythm  Heart sounds: Normal heart sounds  Pulmonary:      Effort: Pulmonary effort is normal       Breath sounds: Normal breath sounds  Abdominal:      General: Bowel sounds are normal  There is no distension  Palpations: Abdomen is soft  Tenderness: There is no abdominal tenderness  Musculoskeletal:         General: Normal range of motion  Cervical back: Normal range of motion  Skin:     General: Skin is warm and dry  Findings: No rash  Neurological:      Mental Status: He is alert and oriented to person, place, and time  Cranial Nerves: No cranial nerve deficit  Psychiatric:         Behavior: Behavior normal          Thought Content: Thought content normal          Judgment: Judgment normal            BMI Counseling: Body mass index is 37 85 kg/m²  The BMI is above normal  Nutrition recommendations include reducing portion sizes, decreasing overall calorie intake, 3-5 servings of fruits/vegetables daily, reducing fast food intake, consuming healthier snacks, decreasing soda and/or juice intake, moderation in carbohydrate intake, increasing intake of lean protein, reducing intake of saturated fat and trans fat and reducing intake of cholesterol  Exercise recommendations include exercising 3-5 times per week

## 2022-09-04 DIAGNOSIS — R20.0 NUMBNESS: ICD-10-CM

## 2022-09-04 DIAGNOSIS — F33.2 SEVERE RECURRENT MAJOR DEPRESSION WITHOUT PSYCHOTIC FEATURES (HCC): ICD-10-CM

## 2022-09-04 DIAGNOSIS — E78.5 HYPERLIPIDEMIA, UNSPECIFIED HYPERLIPIDEMIA TYPE: ICD-10-CM

## 2022-09-06 RX ORDER — BUPROPION HYDROCHLORIDE 75 MG/1
75 TABLET ORAL 2 TIMES DAILY
Qty: 180 TABLET | Refills: 0 | Status: SHIPPED | OUTPATIENT
Start: 2022-09-06

## 2022-09-06 RX ORDER — FENOFIBRATE 145 MG/1
145 TABLET, COATED ORAL DAILY
Qty: 90 TABLET | Refills: 0 | Status: SHIPPED | OUTPATIENT
Start: 2022-09-06 | End: 2022-09-19 | Stop reason: SDUPTHER

## 2022-09-06 RX ORDER — GABAPENTIN 300 MG/1
300 CAPSULE ORAL 2 TIMES DAILY
Qty: 180 CAPSULE | Refills: 0 | Status: SHIPPED | OUTPATIENT
Start: 2022-09-06

## 2022-10-19 DIAGNOSIS — F51.05 INSOMNIA DUE TO OTHER MENTAL DISORDER: ICD-10-CM

## 2022-10-19 DIAGNOSIS — M51.36 BULGING LUMBAR DISC: ICD-10-CM

## 2022-10-19 DIAGNOSIS — F99 INSOMNIA DUE TO OTHER MENTAL DISORDER: ICD-10-CM

## 2022-10-19 DIAGNOSIS — F41.1 GENERALIZED ANXIETY DISORDER: ICD-10-CM

## 2022-10-19 DIAGNOSIS — M79.18 MYOFASCIAL PAIN SYNDROME: ICD-10-CM

## 2022-10-19 DIAGNOSIS — E78.5 HYPERLIPIDEMIA, UNSPECIFIED HYPERLIPIDEMIA TYPE: ICD-10-CM

## 2022-10-20 RX ORDER — TRAMADOL HYDROCHLORIDE 50 MG/1
TABLET ORAL
Qty: 180 TABLET | Refills: 0 | Status: SHIPPED | OUTPATIENT
Start: 2022-10-20

## 2022-10-20 RX ORDER — LORAZEPAM 0.5 MG/1
TABLET ORAL
Qty: 60 TABLET | Refills: 0 | Status: SHIPPED | OUTPATIENT
Start: 2022-10-20

## 2022-10-20 RX ORDER — FENOFIBRATE 145 MG/1
145 TABLET, COATED ORAL DAILY
Qty: 90 TABLET | Refills: 0 | Status: SHIPPED | OUTPATIENT
Start: 2022-10-20

## 2022-11-13 DIAGNOSIS — M51.36 BULGING LUMBAR DISC: ICD-10-CM

## 2022-11-13 DIAGNOSIS — M79.18 MYOFASCIAL PAIN SYNDROME: ICD-10-CM

## 2022-11-14 RX ORDER — TRAMADOL HYDROCHLORIDE 50 MG/1
TABLET ORAL
Qty: 180 TABLET | Refills: 0 | Status: SHIPPED | OUTPATIENT
Start: 2022-11-14

## 2022-11-21 DIAGNOSIS — F51.05 INSOMNIA DUE TO OTHER MENTAL DISORDER: ICD-10-CM

## 2022-11-21 DIAGNOSIS — F41.1 GENERALIZED ANXIETY DISORDER: ICD-10-CM

## 2022-11-21 DIAGNOSIS — R20.0 NUMBNESS: ICD-10-CM

## 2022-11-21 DIAGNOSIS — F33.2 SEVERE RECURRENT MAJOR DEPRESSION WITHOUT PSYCHOTIC FEATURES (HCC): ICD-10-CM

## 2022-11-21 DIAGNOSIS — F99 INSOMNIA DUE TO OTHER MENTAL DISORDER: ICD-10-CM

## 2022-11-22 RX ORDER — GABAPENTIN 300 MG/1
300 CAPSULE ORAL 2 TIMES DAILY
Qty: 180 CAPSULE | Refills: 0 | Status: SHIPPED | OUTPATIENT
Start: 2022-11-22

## 2022-11-22 RX ORDER — BUPROPION HYDROCHLORIDE 75 MG/1
75 TABLET ORAL 2 TIMES DAILY
Qty: 180 TABLET | Refills: 0 | Status: SHIPPED | OUTPATIENT
Start: 2022-11-22

## 2022-11-22 RX ORDER — LORAZEPAM 0.5 MG/1
TABLET ORAL
Qty: 60 TABLET | Refills: 0 | Status: SHIPPED | OUTPATIENT
Start: 2022-11-22

## 2023-01-16 DIAGNOSIS — M51.36 BULGING LUMBAR DISC: ICD-10-CM

## 2023-01-16 DIAGNOSIS — M79.18 MYOFASCIAL PAIN SYNDROME: ICD-10-CM

## 2023-01-16 DIAGNOSIS — F99 INSOMNIA DUE TO OTHER MENTAL DISORDER: ICD-10-CM

## 2023-01-16 DIAGNOSIS — F51.05 INSOMNIA DUE TO OTHER MENTAL DISORDER: ICD-10-CM

## 2023-01-16 DIAGNOSIS — F41.1 GENERALIZED ANXIETY DISORDER: ICD-10-CM

## 2023-01-16 RX ORDER — TRAMADOL HYDROCHLORIDE 50 MG/1
TABLET ORAL
Qty: 180 TABLET | Refills: 0 | Status: SHIPPED | OUTPATIENT
Start: 2023-01-16

## 2023-01-16 RX ORDER — LORAZEPAM 0.5 MG/1
TABLET ORAL
Qty: 60 TABLET | Refills: 0 | Status: SHIPPED | OUTPATIENT
Start: 2023-01-16

## 2023-02-21 DIAGNOSIS — E78.5 HYPERLIPIDEMIA, UNSPECIFIED HYPERLIPIDEMIA TYPE: Primary | ICD-10-CM

## 2023-02-21 DIAGNOSIS — Z12.5 SCREENING FOR PROSTATE CANCER: ICD-10-CM

## 2023-02-21 DIAGNOSIS — Z79.899 HIGH RISK MEDICATION USE: ICD-10-CM

## 2023-02-21 DIAGNOSIS — Z13.6 SCREENING FOR CARDIOVASCULAR CONDITION: ICD-10-CM

## 2023-02-28 LAB
ALBUMIN SERPL-MCNC: 4.4 G/DL (ref 4–5)
ALBUMIN/GLOB SERPL: 1.3 {RATIO} (ref 1.2–2.2)
ALP SERPL-CCNC: 66 IU/L (ref 44–121)
ALT SERPL-CCNC: 37 IU/L (ref 0–44)
APPEARANCE UR: CLEAR
AST SERPL-CCNC: 31 IU/L (ref 0–40)
BACTERIA URNS QL MICRO: NORMAL
BILIRUB SERPL-MCNC: 0.3 MG/DL (ref 0–1.2)
BILIRUB UR QL STRIP: NEGATIVE
BUN SERPL-MCNC: 9 MG/DL (ref 6–24)
BUN/CREAT SERPL: 9 (ref 9–20)
CALCIUM SERPL-MCNC: 9.5 MG/DL (ref 8.7–10.2)
CASTS URNS QL MICRO: NORMAL /LPF
CHLORIDE SERPL-SCNC: 101 MMOL/L (ref 96–106)
CHOLEST SERPL-MCNC: 181 MG/DL (ref 100–199)
CHOLEST/HDLC SERPL: 5 RATIO (ref 0–5)
CO2 SERPL-SCNC: 24 MMOL/L (ref 20–29)
COLOR UR: YELLOW
CREAT SERPL-MCNC: 1.03 MG/DL (ref 0.76–1.27)
EGFR: 89 ML/MIN/1.73
EPI CELLS #/AREA URNS HPF: NORMAL /HPF (ref 0–10)
ERYTHROCYTE [DISTWIDTH] IN BLOOD BY AUTOMATED COUNT: 13.1 % (ref 11.6–15.4)
GLOBULIN SER-MCNC: 3.3 G/DL (ref 1.5–4.5)
GLUCOSE SERPL-MCNC: 116 MG/DL (ref 70–99)
GLUCOSE UR QL: NEGATIVE
HCT VFR BLD AUTO: 39.7 % (ref 37.5–51)
HDLC SERPL-MCNC: 36 MG/DL
HGB BLD-MCNC: 13.1 G/DL (ref 13–17.7)
HGB UR QL STRIP: NEGATIVE
KETONES UR QL STRIP: NEGATIVE
LDLC SERPL CALC-MCNC: 82 MG/DL (ref 0–99)
LDLC SERPL DIRECT ASSAY-MCNC: 80 MG/DL (ref 0–99)
LEUKOCYTE ESTERASE UR QL STRIP: NEGATIVE
MCH RBC QN AUTO: 28.3 PG (ref 26.6–33)
MCHC RBC AUTO-ENTMCNC: 33 G/DL (ref 31.5–35.7)
MCV RBC AUTO: 86 FL (ref 79–97)
MICRO URNS: ABNORMAL
NITRITE UR QL STRIP: NEGATIVE
PH UR STRIP: 8 [PH] (ref 5–7.5)
PLATELET # BLD AUTO: 305 X10E3/UL (ref 150–450)
POTASSIUM SERPL-SCNC: 4.2 MMOL/L (ref 3.5–5.2)
PROT SERPL-MCNC: 7.7 G/DL (ref 6–8.5)
PROT UR QL STRIP: ABNORMAL
PSA SERPL-MCNC: 0.6 NG/ML (ref 0–4)
RBC # BLD AUTO: 4.63 X10E6/UL (ref 4.14–5.8)
RBC #/AREA URNS HPF: NORMAL /HPF (ref 0–2)
SL AMB REFLEX CRITERIA: NORMAL
SL AMB VLDL CHOLESTEROL CALC: 63 MG/DL (ref 5–40)
SODIUM SERPL-SCNC: 138 MMOL/L (ref 134–144)
SP GR UR: 1.02 (ref 1–1.03)
TRIGL SERPL-MCNC: 392 MG/DL (ref 0–149)
UROBILINOGEN UR STRIP-ACNC: 0.2 MG/DL (ref 0.2–1)
WBC # BLD AUTO: 8.3 X10E3/UL (ref 3.4–10.8)
WBC #/AREA URNS HPF: NORMAL /HPF (ref 0–5)

## 2023-03-02 ENCOUNTER — OFFICE VISIT (OUTPATIENT)
Dept: FAMILY MEDICINE CLINIC | Facility: CLINIC | Age: 48
End: 2023-03-02

## 2023-03-02 VITALS
OXYGEN SATURATION: 96 % | DIASTOLIC BLOOD PRESSURE: 94 MMHG | HEIGHT: 75 IN | TEMPERATURE: 97.3 F | HEART RATE: 83 BPM | SYSTOLIC BLOOD PRESSURE: 140 MMHG | RESPIRATION RATE: 16 BRPM | BODY MASS INDEX: 36.13 KG/M2 | WEIGHT: 290.6 LBS

## 2023-03-02 DIAGNOSIS — F99 INSOMNIA DUE TO OTHER MENTAL DISORDER: ICD-10-CM

## 2023-03-02 DIAGNOSIS — F41.1 GENERALIZED ANXIETY DISORDER: ICD-10-CM

## 2023-03-02 DIAGNOSIS — Z00.00 ENCOUNTER FOR WELL ADULT EXAM WITHOUT ABNORMAL FINDINGS: Primary | ICD-10-CM

## 2023-03-02 DIAGNOSIS — Z23 NEED FOR VACCINATION AGAINST STREPTOCOCCUS PNEUMONIAE: ICD-10-CM

## 2023-03-02 DIAGNOSIS — M79.18 MYOFASCIAL PAIN SYNDROME: ICD-10-CM

## 2023-03-02 DIAGNOSIS — E78.5 HYPERLIPIDEMIA, UNSPECIFIED HYPERLIPIDEMIA TYPE: ICD-10-CM

## 2023-03-02 DIAGNOSIS — Z12.11 SCREENING FOR MALIGNANT NEOPLASM OF COLON: ICD-10-CM

## 2023-03-02 DIAGNOSIS — F51.05 INSOMNIA DUE TO OTHER MENTAL DISORDER: ICD-10-CM

## 2023-03-02 DIAGNOSIS — R04.0 EPISTAXIS: ICD-10-CM

## 2023-03-02 DIAGNOSIS — M51.36 BULGING LUMBAR DISC: ICD-10-CM

## 2023-03-02 DIAGNOSIS — K62.5 RECTAL BLEEDING: ICD-10-CM

## 2023-03-02 RX ORDER — ROSUVASTATIN CALCIUM 5 MG/1
5 TABLET, COATED ORAL DAILY
Qty: 30 TABLET | Refills: 5 | Status: SHIPPED | OUTPATIENT
Start: 2023-03-02

## 2023-03-02 RX ORDER — LORAZEPAM 0.5 MG/1
TABLET ORAL
Qty: 60 TABLET | Refills: 0 | Status: SHIPPED | OUTPATIENT
Start: 2023-03-02

## 2023-03-02 RX ORDER — TRAMADOL HYDROCHLORIDE 50 MG/1
TABLET ORAL
Qty: 180 TABLET | Refills: 0 | Status: SHIPPED | OUTPATIENT
Start: 2023-03-02

## 2023-03-02 NOTE — PROGRESS NOTES
Assessment/Plan:     Diagnoses and all orders for this visit:    Encounter for well adult exam without abnormal findings    Screening for malignant neoplasm of colon    Rectal bleeding  -     Ambulatory Referral to Gastroenterology; Future    Epistaxis    Hyperlipidemia, unspecified hyperlipidemia type  -     rosuvastatin (CRESTOR) 5 mg tablet; Take 1 tablet (5 mg total) by mouth daily    Myofascial pain syndrome  -     traMADol (ULTRAM) 50 mg tablet; 2 TABLETS BY MOUTH EVERY 4 HOURS    Bulging lumbar disc  -     traMADol (ULTRAM) 50 mg tablet; 2 TABLETS BY MOUTH EVERY 4 HOURS    Insomnia due to other mental disorder  -     LORazepam (ATIVAN) 0 5 mg tablet; 1-2 tabs at bedtime as directed    Generalized anxiety disorder  -     LORazepam (ATIVAN) 0 5 mg tablet; 1-2 tabs at bedtime as directed    Need for vaccination against Streptococcus pneumoniae  -     Pneumococcal Conjugate Vaccine 20-valent (Pcv20)      Overall patient doing well  Refilled his medications  Lab work ordered  Elivar given  His other chronic medical conditions are stable we need to watch his blood pressure is a slightly elevated  We will add rosuvastatin 5 mg to help with his triglycerides that are consistently increasing  For his rectal issues  Patient will see GI  We will do Neosporin and salt water nasal spray for his nosebleed issues and if not improving will need to see ENT  Can follow-up with me in 6 months or sooner if needed      Subjective:     Chief Complaint   Patient presents with   • Follow-up     Check up  months hypertension        Patient ID: Geraldine Hunt is a 50 y o  male      Patient presents today for 6-month checkup  He needs refills of medications  Otherwise we reviewed his recent blood work his triglycerides are elevated   he is also complaining of rectal bleeding with mucus  That is been going on for the past few months  He is also had right nostril nosebleeds patient will follow-up with GI      The following portions of the patient's history were reviewed and updated as appropriate: allergies, current medications, past family history, past medical history, past social history, past surgical history and problem list     Review of Systems   Constitutional: Negative  HENT: Negative  Eyes: Negative  Respiratory: Negative  Cardiovascular: Negative  Gastrointestinal: Negative  Endocrine: Negative  Genitourinary: Negative  Musculoskeletal: Negative  Skin: Negative  Allergic/Immunologic: Negative  Neurological: Negative  Hematological: Negative  Psychiatric/Behavioral: Negative  All other systems reviewed and are negative  Objective:    Vitals:    03/02/23 0821   BP: 140/94   BP Location: Left arm   Patient Position: Sitting   Cuff Size: Large   Pulse: 83   Resp: 16   Temp: (!) 97 3 °F (36 3 °C)   TempSrc: Tympanic   SpO2: 96%   Weight: 132 kg (290 lb 9 6 oz)   Height: 6' 2 5" (1 892 m)          Physical Exam  Vitals and nursing note reviewed  Constitutional:       General: He is not in acute distress  Appearance: He is well-developed  HENT:      Head: Normocephalic  Right Ear: External ear normal       Left Ear: External ear normal       Nose: Nose normal    Eyes:      General:         Right eye: No discharge  Left eye: No discharge  Conjunctiva/sclera: Conjunctivae normal       Pupils: Pupils are equal, round, and reactive to light  Cardiovascular:      Rate and Rhythm: Normal rate and regular rhythm  Heart sounds: Normal heart sounds  Pulmonary:      Effort: Pulmonary effort is normal       Breath sounds: Normal breath sounds  Abdominal:      General: Bowel sounds are normal  There is no distension  Palpations: Abdomen is soft  Tenderness: There is no abdominal tenderness  Musculoskeletal:         General: Normal range of motion  Cervical back: Normal range of motion  Skin:     General: Skin is warm and dry  Findings: No rash  Neurological:      Mental Status: He is alert and oriented to person, place, and time  Cranial Nerves: No cranial nerve deficit  Psychiatric:         Behavior: Behavior normal          Thought Content: Thought content normal          Judgment: Judgment normal          BMI Counseling: Body mass index is 36 81 kg/m²  The BMI is above normal  Nutrition recommendations include reducing portion sizes, decreasing overall calorie intake, 3-5 servings of fruits/vegetables daily, reducing fast food intake, consuming healthier snacks, decreasing soda and/or juice intake, moderation in carbohydrate intake, increasing intake of lean protein, reducing intake of saturated fat and trans fat and reducing intake of cholesterol  Exercise recommendations include exercising 3-5 times per week

## 2023-03-08 DIAGNOSIS — F99 INSOMNIA DUE TO OTHER MENTAL DISORDER: ICD-10-CM

## 2023-03-08 DIAGNOSIS — F51.05 INSOMNIA DUE TO OTHER MENTAL DISORDER: ICD-10-CM

## 2023-03-08 RX ORDER — TRAZODONE HYDROCHLORIDE 50 MG/1
100 TABLET ORAL
Qty: 60 TABLET | Refills: 2 | Status: SHIPPED | OUTPATIENT
Start: 2023-03-08

## 2023-03-16 ENCOUNTER — TELEPHONE (OUTPATIENT)
Dept: FAMILY MEDICINE CLINIC | Facility: CLINIC | Age: 48
End: 2023-03-16

## 2023-03-16 DIAGNOSIS — S39.92XA INJURY OF BACK, INITIAL ENCOUNTER: Primary | ICD-10-CM

## 2023-03-16 RX ORDER — METHOCARBAMOL 500 MG/1
500 TABLET, FILM COATED ORAL 4 TIMES DAILY
Qty: 30 TABLET | Refills: 0 | Status: SHIPPED | OUTPATIENT
Start: 2023-03-16 | End: 2023-05-31 | Stop reason: SDUPTHER

## 2023-03-16 NOTE — TELEPHONE ENCOUNTER
Patient called, he fell and hurt his back earlier in the week  He has had 2 failed back surgeries in the past  He is going out of town tomorrow and wanted to know if you could order him some muscle relaxers and something for pain  The tramadol doesn't cut it   He is worried about the long flight

## 2023-03-25 DIAGNOSIS — E78.5 HYPERLIPIDEMIA, UNSPECIFIED HYPERLIPIDEMIA TYPE: ICD-10-CM

## 2023-03-25 RX ORDER — ROSUVASTATIN CALCIUM 5 MG/1
5 TABLET, COATED ORAL DAILY
Qty: 90 TABLET | Refills: 2 | Status: SHIPPED | OUTPATIENT
Start: 2023-03-25

## 2023-04-01 DIAGNOSIS — F99 INSOMNIA DUE TO OTHER MENTAL DISORDER: ICD-10-CM

## 2023-04-01 DIAGNOSIS — F51.05 INSOMNIA DUE TO OTHER MENTAL DISORDER: ICD-10-CM

## 2023-04-01 RX ORDER — TRAZODONE HYDROCHLORIDE 50 MG/1
TABLET ORAL
Qty: 180 TABLET | Refills: 1 | Status: SHIPPED | OUTPATIENT
Start: 2023-04-01

## 2023-05-23 DIAGNOSIS — M51.36 BULGING LUMBAR DISC: ICD-10-CM

## 2023-05-23 DIAGNOSIS — M79.18 MYOFASCIAL PAIN SYNDROME: ICD-10-CM

## 2023-05-23 RX ORDER — TRAMADOL HYDROCHLORIDE 50 MG/1
TABLET ORAL
Qty: 180 TABLET | Refills: 0 | Status: SHIPPED | OUTPATIENT
Start: 2023-05-23

## 2023-05-28 DIAGNOSIS — I10 ESSENTIAL HYPERTENSION: ICD-10-CM

## 2023-05-28 RX ORDER — LISINOPRIL 20 MG/1
TABLET ORAL
Qty: 90 TABLET | Refills: 1 | Status: SHIPPED | OUTPATIENT
Start: 2023-05-28

## 2023-05-30 ENCOUNTER — ANESTHESIA EVENT (OUTPATIENT)
Dept: GASTROENTEROLOGY | Facility: AMBULATORY SURGERY CENTER | Age: 48
End: 2023-05-30

## 2023-05-30 ENCOUNTER — ANESTHESIA (OUTPATIENT)
Dept: ANESTHESIOLOGY | Facility: AMBULATORY SURGERY CENTER | Age: 48
End: 2023-05-30

## 2023-05-30 ENCOUNTER — HOSPITAL ENCOUNTER (OUTPATIENT)
Dept: GASTROENTEROLOGY | Facility: AMBULATORY SURGERY CENTER | Age: 48
Discharge: HOME | End: 2023-05-30

## 2023-05-30 ENCOUNTER — ANESTHESIA (OUTPATIENT)
Dept: GASTROENTEROLOGY | Facility: AMBULATORY SURGERY CENTER | Age: 48
End: 2023-05-30

## 2023-05-30 ENCOUNTER — ANESTHESIA EVENT (OUTPATIENT)
Dept: ANESTHESIOLOGY | Facility: AMBULATORY SURGERY CENTER | Age: 48
End: 2023-05-30

## 2023-05-30 VITALS
RESPIRATION RATE: 18 BRPM | WEIGHT: 290 LBS | DIASTOLIC BLOOD PRESSURE: 89 MMHG | TEMPERATURE: 97.6 F | HEART RATE: 77 BPM | BODY MASS INDEX: 33.55 KG/M2 | OXYGEN SATURATION: 95 % | HEIGHT: 78 IN | SYSTOLIC BLOOD PRESSURE: 157 MMHG

## 2023-05-30 DIAGNOSIS — K62.5 RECTAL BLEEDING: ICD-10-CM

## 2023-05-30 RX ORDER — PROPOFOL 10 MG/ML
INJECTION, EMULSION INTRAVENOUS AS NEEDED
Status: DISCONTINUED | OUTPATIENT
Start: 2023-05-30 | End: 2023-05-30

## 2023-05-30 RX ORDER — LIDOCAINE HYDROCHLORIDE 10 MG/ML
INJECTION, SOLUTION EPIDURAL; INFILTRATION; INTRACAUDAL; PERINEURAL AS NEEDED
Status: DISCONTINUED | OUTPATIENT
Start: 2023-05-30 | End: 2023-05-30

## 2023-05-30 RX ORDER — SODIUM CHLORIDE, SODIUM LACTATE, POTASSIUM CHLORIDE, CALCIUM CHLORIDE 600; 310; 30; 20 MG/100ML; MG/100ML; MG/100ML; MG/100ML
50 INJECTION, SOLUTION INTRAVENOUS CONTINUOUS
Status: DISCONTINUED | OUTPATIENT
Start: 2023-05-30 | End: 2023-06-03 | Stop reason: HOSPADM

## 2023-05-30 RX ADMIN — PROPOFOL 50 MG: 10 INJECTION, EMULSION INTRAVENOUS at 08:56

## 2023-05-30 RX ADMIN — PROPOFOL 50 MG: 10 INJECTION, EMULSION INTRAVENOUS at 09:00

## 2023-05-30 RX ADMIN — PROPOFOL 150 MG: 10 INJECTION, EMULSION INTRAVENOUS at 08:48

## 2023-05-30 RX ADMIN — SODIUM CHLORIDE, SODIUM LACTATE, POTASSIUM CHLORIDE, CALCIUM CHLORIDE 50 ML/HR: 600; 310; 30; 20 INJECTION, SOLUTION INTRAVENOUS at 08:24

## 2023-05-30 RX ADMIN — LIDOCAINE HYDROCHLORIDE 30 MG: 10 INJECTION, SOLUTION EPIDURAL; INFILTRATION; INTRACAUDAL; PERINEURAL at 08:48

## 2023-05-30 RX ADMIN — PROPOFOL 50 MG: 10 INJECTION, EMULSION INTRAVENOUS at 08:58

## 2023-05-30 RX ADMIN — PROPOFOL 30 MG: 10 INJECTION, EMULSION INTRAVENOUS at 09:03

## 2023-05-30 RX ADMIN — PROPOFOL 50 MG: 10 INJECTION, EMULSION INTRAVENOUS at 08:51

## 2023-05-30 RX ADMIN — PROPOFOL 50 MG: 10 INJECTION, EMULSION INTRAVENOUS at 08:54

## 2023-05-30 NOTE — ANESTHESIA POSTPROCEDURE EVALUATION
Post-Op Assessment Note    CV Status:  Stable  Pain Score: 0    Pain management: adequate     Mental Status:  Alert and awake   Hydration Status:  Euvolemic   PONV Controlled:  None   Airway Patency:  Patent       Staff: Anesthesiologist, CRNA         No notable events documented      BP   128/58   Temp   86 6   Pulse  78   Resp 16   SpO2   95 RA

## 2023-05-30 NOTE — ANESTHESIA PREPROCEDURE EVALUATION
"Procedure:  PRE-OP ONLY    Relevant Problems   CARDIO   (+) Hyperlipidemia   (+) Hypertension      GI/HEPATIC   (+) Rectal bleeding      MUSCULOSKELETAL   (+) Myofascial pain syndrome      NEURO/PSYCH   (+) Chronic tension-type headache   (+) Generalized anxiety disorder   (+) Myofascial pain syndrome   (+) Severe recurrent major depression without psychotic features ([facility] [address] )      Recent labs personally reviewed:  Lab Results   Component Value Date    HGB 13 1 02/27/2023     02/27/2023    WBC 8 3 02/27/2023     Lab Results   Component Value Date    BUN 9 02/27/2023    CREATININE 1 03 02/27/2023    GLUCOSE 93 04/02/2016    K 4 2 02/27/2023     04/02/2016     No results found for: \"PTT\"   Lab Results   Component Value Date    INR 1 0 03/09/2018       No results found for: \"HGBA1C\"                "

## 2023-05-30 NOTE — ANESTHESIA PREPROCEDURE EVALUATION
"Procedure:  COLONOSCOPY    Relevant Problems   CARDIO   (+) Hyperlipidemia   (+) Hypertension      GI/HEPATIC   (+) Rectal bleeding      MUSCULOSKELETAL   (+) Myofascial pain syndrome      NEURO/PSYCH   (+) Chronic tension-type headache   (+) Generalized anxiety disorder   (+) Myofascial pain syndrome   (+) Severe recurrent major depression without psychotic features ([facility] [address] )        Recent labs personally reviewed:  Lab Results   Component Value Date    HGB 13 1 02/27/2023     02/27/2023    WBC 8 3 02/27/2023     Lab Results   Component Value Date    BUN 9 02/27/2023    CREATININE 1 03 02/27/2023    GLUCOSE 93 04/02/2016    K 4 2 02/27/2023     04/02/2016     No results found for: \"PTT\"   Lab Results   Component Value Date    INR 1 0 03/09/2018       No results found for: \"HGBA1C\"         Anesthesia Plan  ASA Score- 2     Anesthesia Type- IV sedation with anesthesia with ASA Monitors  Additional Monitors:   Airway Plan:           Plan Factors-Exercise tolerance (METS): >4 METS  Chart reviewed  Existing labs reviewed  Patient summary reviewed  Patient is not a current smoker  Induction- intravenous  Postoperative Plan-     Informed Consent- Anesthetic plan and risks discussed with patient  I personally reviewed this patient with the CRNA  Discussed and agreed on the Anesthesia Plan with the CRNA  Eli Hanna             "

## 2023-05-30 NOTE — H&P
[facility] and Physical -  [facility]  Nevaeh Horton 50 y o  male MRN: [id number]    HPI: Nevaeh Horton is a 50y o  year old male who presents for colonoscopy for rectal bleeding    REVIEW OF SYSTEMS: Per the HPI, and otherwise unremarkable      Historical Information   Past Medical History:   Diagnosis Date   • Actinic keratosis     last assessed 10/14/13   • Clotting disorder [facility] 2015 5/30/23 patient denies   • Herniated nucleus pulposus, L5-S1, right     last assessed 1/21/13   • Hyperlipidemia 1999   • Hypertension 1997     Past Surgical History:   Procedure Laterality Date   • BACK SURGERY       Social History   Social History     Substance and Sexual Activity   Alcohol Use Not Currently    Comment: social     Social History     Substance and Sexual Activity   Drug Use No     Social History     Tobacco Use   Smoking Status Never   Smokeless Tobacco Never     Family History   Problem Relation Age of Onset   • Hypertension Mother    • Arthritis Mother    • Asthma Mother    • Cancer Mother    • COPD Mother    • Crohn's disease Mother    • Cancer Father    • COPD Father    • Liver disease Father    • Mental illness Father    • Bipolar disorder Sister    • Asthma Sister    • Cancer Sister    • COPD Sister    • Depression Sister    • Hypertension Sister    • Hypothyroidism Sister    • Mental illness Sister    • Heart defect Brother    • Hypertension Brother    • Stroke Brother    • Hyperlipidemia Maternal Grandmother    • Hypertension Maternal Grandmother    • Hypertension Family    • Thyroid cancer Family    • Colon polyps Neg Hx    • Colon cancer Neg Hx        Meds/Allergies       Current Outpatient Medications:   •  ARIPiprazole (ABILIFY) 5 mg tablet  •  buPROPion (WELLBUTRIN) 75 mg tablet  •  escitalopram (LEXAPRO) 10 mg tablet  •  fenofibrate (TRICOR) 145 mg tablet  •  gabapentin (NEURONTIN) 300 mg capsule  •  lisinopril (ZESTRIL) 20 mg tablet  •  LORazepam (ATIVAN) 0 5 mg tablet  • "methocarbamol (ROBAXIN) 500 mg tablet  •  rosuvastatin (CRESTOR) 5 mg tablet  •  traMADol (ULTRAM) 50 mg tablet  •  traZODone (DESYREL) 50 mg tablet    Current Facility-Administered Medications:   •  lactated ringers infusion, 50 mL/hr, Intravenous, Continuous, 50 mL/hr at 05/30/23 0824    No Known Allergies    Objective     /99   Pulse 79   Temp 97 6 °F (36 4 °C) (Temporal)   Resp 12   Ht 6' 6\" (1 981 m)   Wt 132 kg (290 lb)   SpO2 95%   BMI 33 51 kg/m²     PHYSICAL EXAM    Gen: NAD AAOx3  Head: Normocephalic, Atraumatic  CV: S1S2 RRR no m/r/g  CHEST: Clear b/l no c/r/w  ABD: soft, +BS NT/ND  EXT: no edema    ASSESSMENT/PLAN:  This is a 50y o  year old male here for colonoscopy, and he is stable and optimized for his procedure        "

## 2023-06-07 ENCOUNTER — DOCUMENTATION (OUTPATIENT)
Dept: FAMILY MEDICINE CLINIC | Facility: CLINIC | Age: 48
End: 2023-06-07

## 2023-06-07 NOTE — RESULT ENCOUNTER NOTE
Biopsies essentially negative  5-year colonoscopy recall I left a voicemail for the patient  Tried to leave voicemail but the mailbox is full

## 2023-06-23 ENCOUNTER — OFFICE VISIT (OUTPATIENT)
Age: 48
End: 2023-06-23
Payer: COMMERCIAL

## 2023-06-23 VITALS
BODY MASS INDEX: 34.57 KG/M2 | HEIGHT: 78 IN | DIASTOLIC BLOOD PRESSURE: 92 MMHG | SYSTOLIC BLOOD PRESSURE: 148 MMHG | WEIGHT: 298.8 LBS

## 2023-06-23 DIAGNOSIS — K62.5 RECTAL BLEEDING: Primary | ICD-10-CM

## 2023-06-23 DIAGNOSIS — Z12.11 SCREENING FOR COLON CANCER: ICD-10-CM

## 2023-06-23 PROCEDURE — 99213 OFFICE O/P EST LOW 20 MIN: CPT | Performed by: INTERNAL MEDICINE

## 2023-06-23 NOTE — PROGRESS NOTES
[id number] - Outpatient Follow-up Note  Timur Mcfarland 46 y o  male MRN: [id number]  Encounter: [id number]    ASSESSMENT AND PLAN:      1  Rectal bleeding  Colonoscopy showed isolated area of erythema possibly from prolapsed biopsies were essentially negative  We discussed the merits of fiber I do recommend he consume 15 to 20 g a day  2  Screening for colon cancer  Negative colonoscopy just done      Followup Appointment: As needed  ______________________________________________________________________    Chief Complaint   Patient presents with   • 8 week follow up     HPI: Patient doing well no further rectal bleeding  He does have occasional loose stool 1 or 2 times a day no nocturnal awakening no abdominal pain      Historical Information   Past Medical History:   Diagnosis Date   • Actinic keratosis     last assessed 10/14/13   • Clotting disorder [facility] 2015 5/30/23 patient denies   • Herniated nucleus pulposus, L5-S1, right     last assessed 1/21/13   • Hyperlipidemia 1999   • Hypertension 1997     Past Surgical History:   Procedure Laterality Date   • BACK SURGERY     • COLONOSCOPY     • UPPER GASTROINTESTINAL ENDOSCOPY       Social History     Substance and Sexual Activity   Alcohol Use Not Currently    Comment: social     Social History     Substance and Sexual Activity   Drug Use No     Social History     Tobacco Use   Smoking Status Never   Smokeless Tobacco Never     Family History   Problem Relation Age of Onset   • Hypertension Mother    • Arthritis Mother    • Asthma Mother    • Cancer Mother    • COPD Mother    • Crohn's disease Mother    • Cancer Father    • COPD Father    • Liver disease Father    • Mental illness Father    • Bipolar disorder Sister    • Asthma Sister    • Cancer Sister    • COPD Sister    • Depression Sister    • Hypertension Sister    • Hypothyroidism Sister    • Mental illness Sister    • Heart defect Brother    • Hypertension "Brother    • Stroke Brother    • Hyperlipidemia Maternal Grandmother    • Hypertension Maternal Grandmother    • Hypertension Family    • Thyroid cancer Family    • Colon polyps Neg Hx    • Colon cancer Neg Hx          Current Outpatient Medications:   •  ARIPiprazole (ABILIFY) 5 mg tablet  •  buPROPion (WELLBUTRIN) 75 mg tablet  •  escitalopram (LEXAPRO) 10 mg tablet  •  fenofibrate (TRICOR) 145 mg tablet  •  gabapentin (NEURONTIN) 300 mg capsule  •  lisinopril (ZESTRIL) 20 mg tablet  •  LORazepam (ATIVAN) 0 5 mg tablet  •  methocarbamol (ROBAXIN) 500 mg tablet  •  rosuvastatin (CRESTOR) 5 mg tablet  •  traMADol (ULTRAM) 50 mg tablet  •  traZODone (DESYREL) 50 mg tablet  No Known Allergies  Reviewed medications and allergies and updated as indicated    PHYSICAL EXAM:    Blood pressure 148/92, height 6' 6\" (1 981 m), weight 136 kg (298 lb 12 8 oz)  Body mass index is 34 53 kg/m²  General Appearance: NAD, cooperative, alert  Eyes: Anicteric, PERRLA, EOMI  ENT:  Normocephalic, atraumatic, normal mucosa  Neck:  Supple, symmetrical, trachea midline  Resp:  Clear to auscultation bilaterally; no rales, rhonchi or wheezing; respirations unlabored   CV:  S1 S2, Regular rate and rhythm; no murmur, rub, or gallop  GI:  Soft, non-tender, non-distended; normal bowel sounds; no masses, no organomegaly   Rectal: Deferred  Musculoskeletal: No cyanosis, clubbing or edema  Normal ROM    Skin:  No jaundice, rashes, or lesions   Heme/Lymph: No palpable cervical lymphadenopathy  Psych: Normal affect, good eye contact  Neuro: No gross deficits, AAOx3    Lab Results:   Lab Results   Component Value Date    WBC 8 3 02/27/2023    HGB 13 1 02/27/2023    HCT 39 7 02/27/2023    MCV 86 02/27/2023     02/27/2023     Lab Results   Component Value Date     04/02/2016    K 4 2 02/27/2023     02/27/2023    CO2 24 02/27/2023    BUN 9 02/27/2023    CREATININE 1 03 02/27/2023    GLUCOSE 93 04/02/2016    GLUF 99 10/14/2017    " "CALCIUM 9 5 08/09/2021    AST 31 02/27/2023    ALT 37 02/27/2023    ALKPHOS 52 08/09/2021    PROT 9 2 04/02/2016    PROT 7 4 04/02/2016    BILITOT 0 6 04/02/2016    EGFR 89 02/27/2023     No results found for: \"IRON\", \"TIBC\", \"FERRITIN\"  No results found for: \"LIPASE\"    Radiology Results:   Colonoscopy    Addendum Date: 5/30/2023 Addendum:   [address] [place] [phone] DATE OF SERVICE: 5/30/23 PHYSICIAN(S): Attending: Hannah Kinney MD Fellow: No Staff Documented INDICATION: Rectal bleeding POST-OP DIAGNOSIS: See the impression below  HISTORY: Prior colonoscopy: No prior colonoscopy  BOWEL PREPARATION: Golytely/Colyte/Trilyte PREPROCEDURE: Informed consent was obtained for the procedure, including sedation  Risks including but not limited to bleeding, infection, perforation, adverse drug reaction and aspiration were explained in detail  Also explained about less than 100% sensitivity with the exam and other alternatives  The patient was placed in the left lateral decubitus position  Procedure: Colonoscopy DETAILS OF PROCEDURE: Patient was taken to the procedure room where a time out was performed to confirm correct patient and correct procedure  The patient underwent monitored anesthesia care, which was administered by an anesthesia professional  The patient's blood pressure, heart rate, level of consciousness, oxygen, respirations and ECG were monitored throughout the procedure  A digital rectal exam was performed  The scope was introduced through the anus and advanced to the cecum  Retroflexion was performed in the rectum  The quality of bowel preparation was evaluated using the [facility] Bowel Preparation Scale with scores of: right colon = 2, transverse colon = 2, left colon = 2  The total BBPS score was 6  Bowel prep was adequate  The patient experienced no blood loss  The procedure was not difficult   The patient tolerated the procedure " well  There were no apparent adverse events  ANESTHESIA INFORMATION: ASA: II Anesthesia Type: IV Sedation with Anesthesia MEDICATIONS: No administrations occurring from 0838 to 0910 on 05/30/23 FINDINGS: Internal medium (grade 1) hemorrhoids; no bleeding was identified Moderate erythematous and hemorrhagic mucosa measuring 30 mm x 10 mm in the rectum; performed cold forceps biopsy  Located and only a small portion of the rectum perhaps 30 degrees of a 360 degree circumference  Biopsies taken Normal EVENTS: Procedure Events Event Event Time ENDO CECUM REACHED 5/30/2023  8:53 AM ENDO SCOPE OUT TIME 5/30/2023  9:07 AM SPECIMENS: ID Type Source Tests Collected by Time Destination 1 : bx rectal erythema Tissue Rectum EXT-TISSUE EXAM (CBL,HNL   ) Estela Fields MD 5/30/2023  9:08 AM  EQUIPMENT: Colonoscope -[id number] IMPRESSION: Medium (grade 1) hemorrhoids Moderate erythematous and hemorrhagic mucosa measuring 30 mm x 10 mm in the rectum; performed cold forceps biopsy Normal  RECOMMENDATION:  Repeat colonoscopy in 5 years  Inflammatory bowel disease   Not really IBD erythema noted on colonoscopy  We noted mild inflammation in the rectum which we biopsied  Could have been prep artifact or nothing significant  Could also be proctitis  I will contact you by phone with the results in approximately 2 weeks with further recommendations  Estela Fields MD     Result Date: 5/30/2023  Narrative: Table formatting from the original result was not included  [address] [place] [address] [phone] DATE OF SERVICE: 5/30/23 PHYSICIAN(S): Attending: Estela Fields MD Fellow: No Staff Documented INDICATION: Rectal bleeding POST-OP DIAGNOSIS: See the impression below  HISTORY: Prior colonoscopy: No prior colonoscopy  BOWEL PREPARATION: Golytely/Colyte/Trilyte PREPROCEDURE: Informed consent was obtained for the procedure, including sedation   Risks including but not limited to bleeding, infection, perforation, adverse drug reaction and aspiration were explained in detail  Also explained about less than 100% sensitivity with the exam and other alternatives  The patient was placed in the left lateral decubitus position  Procedure: Colonoscopy DETAILS OF PROCEDURE: Patient was taken to the procedure room where a time out was performed to confirm correct patient and correct procedure  The patient underwent monitored anesthesia care, which was administered by an anesthesia professional  The patient's blood pressure, heart rate, level of consciousness, oxygen, respirations and ECG were monitored throughout the procedure  A digital rectal exam was performed  The scope was introduced through the anus and advanced to the cecum  Retroflexion was performed in the rectum  The quality of bowel preparation was evaluated using the [facility] Bowel Preparation Scale with scores of: right colon = 2, transverse colon = 2, left colon = 2  The total BBPS score was 6  Bowel prep was adequate  The patient experienced no blood loss  The procedure was not difficult  The patient tolerated the procedure well  There were no apparent adverse events  ANESTHESIA INFORMATION: ASA: II Anesthesia Type: IV Sedation with Anesthesia MEDICATIONS: No administrations occurring from 0838 to 0910 on 05/30/23 FINDINGS: Internal medium (grade 1) hemorrhoids; no bleeding was identified Moderate erythematous and hemorrhagic mucosa measuring 30 mm x 10 mm in the rectum; performed cold forceps biopsy  Located and only a small portion of the rectum perhaps 30 degrees of a 360 degree circumference  Biopsies taken Normal EVENTS: Procedure Events Event Event Time ENDO CECUM REACHED 5/30/2023  8:53 AM ENDO SCOPE OUT TIME 5/30/2023  9:07 AM SPECIMENS: ID Type Source Tests Collected by Time Destination 1 : bx rectal erythema Tissue Rectum EXT-TISSUE EXAM (CBL,HNL   ) Yaneth Wolf MD 5/30/2023  9:08 AM  EQUIPMENT: Colonoscope -[id number]     Impression: Medium (grade 1) hemorrhoids Moderate erythematous and hemorrhagic mucosa measuring 30 mm x 10 mm in the rectum; performed cold forceps biopsy Normal  RECOMMENDATION:  Repeat screening colonoscopy in 10 years  Repeat colonoscopy in 5 years  Inflammatory bowel disease   Not really IBD erythema noted on colonoscopy  We noted mild inflammation in the rectum which we biopsied  Could have been prep artifact or nothing significant  Could also be proctitis  I will contact you by phone with the results in approximately 2 weeks with further recommendations    Ayaan Cortez MD

## 2023-06-28 DIAGNOSIS — F99 INSOMNIA DUE TO OTHER MENTAL DISORDER: ICD-10-CM

## 2023-06-28 DIAGNOSIS — F41.1 GENERALIZED ANXIETY DISORDER: ICD-10-CM

## 2023-06-28 DIAGNOSIS — F51.05 INSOMNIA DUE TO OTHER MENTAL DISORDER: ICD-10-CM

## 2023-06-29 RX ORDER — LORAZEPAM 0.5 MG/1
TABLET ORAL
Qty: 60 TABLET | Refills: 0 | Status: SHIPPED | OUTPATIENT
Start: 2023-06-29

## 2023-07-06 DIAGNOSIS — R20.0 NUMBNESS: ICD-10-CM

## 2023-07-07 RX ORDER — GABAPENTIN 300 MG/1
300 CAPSULE ORAL 2 TIMES DAILY
Qty: 180 CAPSULE | Refills: 0 | Status: SHIPPED | OUTPATIENT
Start: 2023-07-07

## 2023-07-11 DIAGNOSIS — E78.5 HYPERLIPIDEMIA, UNSPECIFIED HYPERLIPIDEMIA TYPE: ICD-10-CM

## 2023-07-11 DIAGNOSIS — F33.2 SEVERE RECURRENT MAJOR DEPRESSION WITHOUT PSYCHOTIC FEATURES (HCC): ICD-10-CM

## 2023-07-11 RX ORDER — FENOFIBRATE 145 MG/1
TABLET, COATED ORAL
Qty: 90 TABLET | Refills: 2 | Status: SHIPPED | OUTPATIENT
Start: 2023-07-11

## 2023-07-11 RX ORDER — ESCITALOPRAM OXALATE 10 MG/1
TABLET ORAL
Qty: 90 TABLET | Refills: 2 | Status: SHIPPED | OUTPATIENT
Start: 2023-07-11

## 2023-07-31 DIAGNOSIS — F33.2 SEVERE RECURRENT MAJOR DEPRESSION WITHOUT PSYCHOTIC FEATURES (HCC): ICD-10-CM

## 2023-07-31 DIAGNOSIS — F99 INSOMNIA DUE TO OTHER MENTAL DISORDER: ICD-10-CM

## 2023-07-31 DIAGNOSIS — I10 ESSENTIAL HYPERTENSION: ICD-10-CM

## 2023-07-31 DIAGNOSIS — F51.05 INSOMNIA DUE TO OTHER MENTAL DISORDER: ICD-10-CM

## 2023-07-31 DIAGNOSIS — M79.18 MYOFASCIAL PAIN SYNDROME: ICD-10-CM

## 2023-07-31 DIAGNOSIS — F41.1 GENERALIZED ANXIETY DISORDER: ICD-10-CM

## 2023-07-31 DIAGNOSIS — E78.5 HYPERLIPIDEMIA, UNSPECIFIED HYPERLIPIDEMIA TYPE: ICD-10-CM

## 2023-07-31 DIAGNOSIS — R20.0 NUMBNESS: ICD-10-CM

## 2023-07-31 DIAGNOSIS — M51.36 BULGING LUMBAR DISC: ICD-10-CM

## 2023-08-01 RX ORDER — TRAZODONE HYDROCHLORIDE 50 MG/1
100 TABLET ORAL
Qty: 180 TABLET | Refills: 1 | Status: SHIPPED | OUTPATIENT
Start: 2023-08-01 | End: 2023-09-07 | Stop reason: SDUPTHER

## 2023-08-01 RX ORDER — FENOFIBRATE 145 MG/1
145 TABLET, COATED ORAL DAILY
Qty: 90 TABLET | Refills: 1 | Status: SHIPPED | OUTPATIENT
Start: 2023-08-01 | End: 2023-09-07 | Stop reason: SDUPTHER

## 2023-08-01 RX ORDER — LISINOPRIL 20 MG/1
20 TABLET ORAL DAILY
Qty: 90 TABLET | Refills: 1 | Status: SHIPPED | OUTPATIENT
Start: 2023-08-01 | End: 2023-09-07 | Stop reason: SDUPTHER

## 2023-08-01 RX ORDER — BUPROPION HYDROCHLORIDE 75 MG/1
75 TABLET ORAL 2 TIMES DAILY
Qty: 180 TABLET | Refills: 1 | Status: SHIPPED | OUTPATIENT
Start: 2023-08-01 | End: 2023-09-07 | Stop reason: SDUPTHER

## 2023-08-01 RX ORDER — ESCITALOPRAM OXALATE 10 MG/1
10 TABLET ORAL DAILY
Qty: 90 TABLET | Refills: 1 | Status: SHIPPED | OUTPATIENT
Start: 2023-08-01 | End: 2023-09-07 | Stop reason: SDUPTHER

## 2023-08-01 RX ORDER — GABAPENTIN 300 MG/1
300 CAPSULE ORAL 2 TIMES DAILY
Qty: 180 CAPSULE | Refills: 1 | Status: SHIPPED | OUTPATIENT
Start: 2023-08-01 | End: 2023-09-07 | Stop reason: SDUPTHER

## 2023-08-01 RX ORDER — ROSUVASTATIN CALCIUM 5 MG/1
5 TABLET, COATED ORAL DAILY
Qty: 90 TABLET | Refills: 1 | Status: SHIPPED | OUTPATIENT
Start: 2023-08-01 | End: 2023-09-07 | Stop reason: SDUPTHER

## 2023-08-01 RX ORDER — LORAZEPAM 0.5 MG/1
TABLET ORAL
Qty: 60 TABLET | Refills: 0 | Status: SHIPPED | OUTPATIENT
Start: 2023-08-01 | End: 2023-09-07 | Stop reason: SDUPTHER

## 2023-08-01 RX ORDER — TRAMADOL HYDROCHLORIDE 50 MG/1
TABLET ORAL
Qty: 180 TABLET | Refills: 0 | Status: SHIPPED | OUTPATIENT
Start: 2023-08-01 | End: 2023-09-07 | Stop reason: SDUPTHER

## 2023-08-15 ENCOUNTER — DOCUMENTATION (OUTPATIENT)
Dept: FAMILY MEDICINE CLINIC | Facility: CLINIC | Age: 48
End: 2023-08-15

## 2023-09-07 DIAGNOSIS — F33.2 SEVERE RECURRENT MAJOR DEPRESSION WITHOUT PSYCHOTIC FEATURES (HCC): ICD-10-CM

## 2023-09-07 DIAGNOSIS — F99 INSOMNIA DUE TO OTHER MENTAL DISORDER: ICD-10-CM

## 2023-09-07 DIAGNOSIS — R20.0 NUMBNESS: ICD-10-CM

## 2023-09-07 DIAGNOSIS — I10 ESSENTIAL HYPERTENSION: ICD-10-CM

## 2023-09-07 DIAGNOSIS — M79.18 MYOFASCIAL PAIN SYNDROME: ICD-10-CM

## 2023-09-07 DIAGNOSIS — E78.5 HYPERLIPIDEMIA, UNSPECIFIED HYPERLIPIDEMIA TYPE: ICD-10-CM

## 2023-09-07 DIAGNOSIS — F51.05 INSOMNIA DUE TO OTHER MENTAL DISORDER: ICD-10-CM

## 2023-09-07 DIAGNOSIS — M51.36 BULGING LUMBAR DISC: ICD-10-CM

## 2023-09-07 DIAGNOSIS — F41.1 GENERALIZED ANXIETY DISORDER: ICD-10-CM

## 2023-09-08 RX ORDER — ESCITALOPRAM OXALATE 10 MG/1
10 TABLET ORAL DAILY
Qty: 90 TABLET | Refills: 1 | Status: SHIPPED | OUTPATIENT
Start: 2023-09-08

## 2023-09-08 RX ORDER — TRAMADOL HYDROCHLORIDE 50 MG/1
TABLET ORAL
Qty: 180 TABLET | Refills: 0 | Status: SHIPPED | OUTPATIENT
Start: 2023-09-08

## 2023-09-08 RX ORDER — FENOFIBRATE 145 MG/1
145 TABLET, COATED ORAL DAILY
Qty: 90 TABLET | Refills: 1 | Status: SHIPPED | OUTPATIENT
Start: 2023-09-08

## 2023-09-08 RX ORDER — BUPROPION HYDROCHLORIDE 75 MG/1
75 TABLET ORAL 2 TIMES DAILY
Qty: 180 TABLET | Refills: 0 | Status: SHIPPED | OUTPATIENT
Start: 2023-09-08

## 2023-09-08 RX ORDER — LISINOPRIL 20 MG/1
20 TABLET ORAL DAILY
Qty: 90 TABLET | Refills: 1 | Status: SHIPPED | OUTPATIENT
Start: 2023-09-08

## 2023-09-08 RX ORDER — LORAZEPAM 0.5 MG/1
TABLET ORAL
Qty: 60 TABLET | Refills: 0 | Status: SHIPPED | OUTPATIENT
Start: 2023-09-08

## 2023-09-08 RX ORDER — ROSUVASTATIN CALCIUM 5 MG/1
5 TABLET, COATED ORAL DAILY
Qty: 90 TABLET | Refills: 1 | Status: SHIPPED | OUTPATIENT
Start: 2023-09-08

## 2023-09-08 RX ORDER — GABAPENTIN 300 MG/1
300 CAPSULE ORAL 2 TIMES DAILY
Qty: 180 CAPSULE | Refills: 1 | Status: SHIPPED | OUTPATIENT
Start: 2023-09-08

## 2023-09-08 RX ORDER — TRAZODONE HYDROCHLORIDE 50 MG/1
100 TABLET ORAL
Qty: 180 TABLET | Refills: 1 | Status: SHIPPED | OUTPATIENT
Start: 2023-09-08

## 2023-10-06 DIAGNOSIS — F99 INSOMNIA DUE TO OTHER MENTAL DISORDER: ICD-10-CM

## 2023-10-06 DIAGNOSIS — F51.05 INSOMNIA DUE TO OTHER MENTAL DISORDER: ICD-10-CM

## 2023-10-06 DIAGNOSIS — F41.1 GENERALIZED ANXIETY DISORDER: ICD-10-CM

## 2023-10-06 RX ORDER — LORAZEPAM 0.5 MG/1
TABLET ORAL
Qty: 60 TABLET | Refills: 0 | Status: SHIPPED | OUTPATIENT
Start: 2023-10-06

## 2023-10-11 ENCOUNTER — OFFICE VISIT (OUTPATIENT)
Dept: FAMILY MEDICINE CLINIC | Facility: CLINIC | Age: 48
End: 2023-10-11
Payer: COMMERCIAL

## 2023-10-11 VITALS
SYSTOLIC BLOOD PRESSURE: 130 MMHG | HEART RATE: 71 BPM | WEIGHT: 304 LBS | DIASTOLIC BLOOD PRESSURE: 88 MMHG | BODY MASS INDEX: 35.17 KG/M2 | RESPIRATION RATE: 16 BRPM | TEMPERATURE: 97.5 F | HEIGHT: 78 IN | OXYGEN SATURATION: 100 %

## 2023-10-11 DIAGNOSIS — R05.8 ACE-INHIBITOR COUGH: ICD-10-CM

## 2023-10-11 DIAGNOSIS — T46.4X5A ACE-INHIBITOR COUGH: ICD-10-CM

## 2023-10-11 DIAGNOSIS — R25.2 MUSCLE CRAMPS: ICD-10-CM

## 2023-10-11 DIAGNOSIS — I10 PRIMARY HYPERTENSION: Primary | ICD-10-CM

## 2023-10-11 DIAGNOSIS — F33.2 SEVERE RECURRENT MAJOR DEPRESSION WITHOUT PSYCHOTIC FEATURES (HCC): ICD-10-CM

## 2023-10-11 PROCEDURE — 99214 OFFICE O/P EST MOD 30 MIN: CPT | Performed by: FAMILY MEDICINE

## 2023-10-11 RX ORDER — VALSARTAN 160 MG/1
160 TABLET ORAL DAILY
Qty: 90 TABLET | Refills: 1 | Status: SHIPPED | OUTPATIENT
Start: 2023-10-11

## 2023-10-11 NOTE — PROGRESS NOTES
Assessment/Plan:       Diagnoses and all orders for this visit:    Primary hypertension  -     valsartan (DIOVAN) 160 mg tablet; Take 1 tablet (160 mg total) by mouth daily    ACE-inhibitor cough    Muscle cramps  -     Comprehensive metabolic panel; Future  -     Magnesium; Future  -     CBC; Future  -     Comprehensive metabolic panel  -     Magnesium  -     CBC    Severe recurrent major depression without psychotic features ([address])        We will stop lisinopril due to his ACE inhibitor cough  We will start valsartan 160 we will need to closely monitor his blood pressure  He is also having cramps in his leg we will check some blood work  He will continue his other medications  He can follow-up with me in 6 months or sooner if needed    Subjective:     Chief Complaint   Patient presents with    Follow-up     6 momth        Patient ID: Rosalva Kennedy is a 46 y.o. male. Patient presents today for 6-month check of chronic issues  His only complaint today is a chronic cough for the past 9 months  This feels like a dry tickle cough in his chest  Otherwise he is doing well with no other major complaints        The following portions of the patient's history were reviewed and updated as appropriate: allergies, current medications, past family history, past medical history, past social history, past surgical history and problem list.    Review of Systems   Constitutional: Negative. HENT: Negative. Eyes: Negative. Respiratory: Negative. Cardiovascular: Negative. Gastrointestinal: Negative. Endocrine: Negative. Genitourinary: Negative. Musculoskeletal: Negative. Skin: Negative. Allergic/Immunologic: Negative. Neurological: Negative. Hematological: Negative. Psychiatric/Behavioral: Negative. All other systems reviewed and are negative.         Objective:    Vitals:    10/11/23 1006   BP: 130/88   BP Location: Left arm   Patient Position: Sitting   Cuff Size: Large   Pulse: 71   Resp: 16   Temp: 97.5 °F (36.4 °C)   TempSrc: Tympanic   SpO2: 100%   Weight: (!) 138 kg (304 lb)   Height: 6' 6" (1.981 m)          Physical Exam  Vitals and nursing note reviewed. Constitutional:       General: He is not in acute distress. Appearance: Normal appearance. He is well-developed. HENT:      Head: Normocephalic. Right Ear: External ear normal.      Left Ear: External ear normal.      Nose: Nose normal.      Mouth/Throat:      Pharynx: Oropharynx is clear. Eyes:      General:         Right eye: No discharge. Left eye: No discharge. Conjunctiva/sclera: Conjunctivae normal.      Pupils: Pupils are equal, round, and reactive to light. Cardiovascular:      Rate and Rhythm: Normal rate and regular rhythm. Heart sounds: Normal heart sounds. Pulmonary:      Effort: Pulmonary effort is normal.      Breath sounds: Normal breath sounds. Abdominal:      General: Bowel sounds are normal. There is no distension. Palpations: Abdomen is soft. Tenderness: There is no abdominal tenderness. Musculoskeletal:         General: Normal range of motion. Cervical back: Normal range of motion. Skin:     General: Skin is warm and dry. Findings: No rash. Neurological:      General: No focal deficit present. Mental Status: He is alert and oriented to person, place, and time. Mental status is at baseline. Cranial Nerves: No cranial nerve deficit. Psychiatric:         Behavior: Behavior normal.         Thought Content:  Thought content normal.         Judgment: Judgment normal.

## 2023-10-29 DIAGNOSIS — R20.0 NUMBNESS: ICD-10-CM

## 2023-10-29 DIAGNOSIS — F99 INSOMNIA DUE TO OTHER MENTAL DISORDER: ICD-10-CM

## 2023-10-29 DIAGNOSIS — F51.05 INSOMNIA DUE TO OTHER MENTAL DISORDER: ICD-10-CM

## 2023-10-29 DIAGNOSIS — M79.18 MYOFASCIAL PAIN SYNDROME: ICD-10-CM

## 2023-10-29 DIAGNOSIS — E78.5 HYPERLIPIDEMIA, UNSPECIFIED HYPERLIPIDEMIA TYPE: ICD-10-CM

## 2023-10-29 DIAGNOSIS — M51.36 BULGING LUMBAR DISC: ICD-10-CM

## 2023-10-29 DIAGNOSIS — F33.2 SEVERE RECURRENT MAJOR DEPRESSION WITHOUT PSYCHOTIC FEATURES (HCC): ICD-10-CM

## 2023-10-29 DIAGNOSIS — F41.1 GENERALIZED ANXIETY DISORDER: ICD-10-CM

## 2023-10-30 RX ORDER — FENOFIBRATE 145 MG/1
145 TABLET, COATED ORAL DAILY
Qty: 90 TABLET | Refills: 1 | Status: SHIPPED | OUTPATIENT
Start: 2023-10-30

## 2023-10-30 RX ORDER — TRAZODONE HYDROCHLORIDE 50 MG/1
100 TABLET ORAL
Qty: 180 TABLET | Refills: 1 | Status: SHIPPED | OUTPATIENT
Start: 2023-10-30

## 2023-10-30 RX ORDER — GABAPENTIN 300 MG/1
300 CAPSULE ORAL 2 TIMES DAILY
Qty: 180 CAPSULE | Refills: 1 | Status: SHIPPED | OUTPATIENT
Start: 2023-10-30

## 2023-10-30 RX ORDER — TRAMADOL HYDROCHLORIDE 50 MG/1
TABLET ORAL
Qty: 180 TABLET | Refills: 0 | Status: SHIPPED | OUTPATIENT
Start: 2023-10-30

## 2023-10-30 RX ORDER — LORAZEPAM 0.5 MG/1
TABLET ORAL
Qty: 60 TABLET | Refills: 0 | Status: SHIPPED | OUTPATIENT
Start: 2023-10-30

## 2023-10-30 RX ORDER — BUPROPION HYDROCHLORIDE 75 MG/1
75 TABLET ORAL 2 TIMES DAILY
Qty: 180 TABLET | Refills: 1 | Status: SHIPPED | OUTPATIENT
Start: 2023-10-30

## 2023-10-30 RX ORDER — ROSUVASTATIN CALCIUM 5 MG/1
5 TABLET, COATED ORAL DAILY
Qty: 90 TABLET | Refills: 1 | Status: SHIPPED | OUTPATIENT
Start: 2023-10-30

## 2023-10-30 RX ORDER — ESCITALOPRAM OXALATE 10 MG/1
10 TABLET ORAL DAILY
Qty: 90 TABLET | Refills: 1 | Status: SHIPPED | OUTPATIENT
Start: 2023-10-30

## 2023-11-28 DIAGNOSIS — M51.36 BULGING LUMBAR DISC: ICD-10-CM

## 2023-11-28 DIAGNOSIS — F51.05 INSOMNIA DUE TO OTHER MENTAL DISORDER: ICD-10-CM

## 2023-11-28 DIAGNOSIS — M79.18 MYOFASCIAL PAIN SYNDROME: ICD-10-CM

## 2023-11-28 DIAGNOSIS — F41.1 GENERALIZED ANXIETY DISORDER: ICD-10-CM

## 2023-11-28 DIAGNOSIS — F99 INSOMNIA DUE TO OTHER MENTAL DISORDER: ICD-10-CM

## 2023-11-28 RX ORDER — TRAMADOL HYDROCHLORIDE 50 MG/1
TABLET ORAL
Qty: 180 TABLET | Refills: 0 | Status: SHIPPED | OUTPATIENT
Start: 2023-11-28

## 2023-11-28 RX ORDER — LORAZEPAM 0.5 MG/1
TABLET ORAL
Qty: 60 TABLET | Refills: 1 | Status: SHIPPED | OUTPATIENT
Start: 2023-11-28

## 2023-12-10 PROBLEM — R05.8 ACE-INHIBITOR COUGH: Status: RESOLVED | Noted: 2023-10-11 | Resolved: 2023-12-10

## 2023-12-10 PROBLEM — T46.4X5A ACE-INHIBITOR COUGH: Status: RESOLVED | Noted: 2023-10-11 | Resolved: 2023-12-10

## 2024-01-06 DIAGNOSIS — I10 PRIMARY HYPERTENSION: ICD-10-CM

## 2024-01-06 DIAGNOSIS — F51.05 INSOMNIA DUE TO OTHER MENTAL DISORDER: ICD-10-CM

## 2024-01-06 DIAGNOSIS — F41.1 GENERALIZED ANXIETY DISORDER: ICD-10-CM

## 2024-01-06 DIAGNOSIS — M79.18 MYOFASCIAL PAIN SYNDROME: ICD-10-CM

## 2024-01-06 DIAGNOSIS — F99 INSOMNIA DUE TO OTHER MENTAL DISORDER: ICD-10-CM

## 2024-01-06 DIAGNOSIS — M51.36 BULGING LUMBAR DISC: ICD-10-CM

## 2024-01-08 DIAGNOSIS — F51.05 INSOMNIA DUE TO OTHER MENTAL DISORDER: ICD-10-CM

## 2024-01-08 DIAGNOSIS — F33.2 SEVERE RECURRENT MAJOR DEPRESSION WITHOUT PSYCHOTIC FEATURES (HCC): ICD-10-CM

## 2024-01-08 DIAGNOSIS — R20.0 NUMBNESS: ICD-10-CM

## 2024-01-08 DIAGNOSIS — F99 INSOMNIA DUE TO OTHER MENTAL DISORDER: ICD-10-CM

## 2024-01-08 DIAGNOSIS — E78.5 HYPERLIPIDEMIA, UNSPECIFIED HYPERLIPIDEMIA TYPE: ICD-10-CM

## 2024-01-08 RX ORDER — VALSARTAN 160 MG/1
160 TABLET ORAL DAILY
Qty: 90 TABLET | Refills: 1 | Status: SHIPPED | OUTPATIENT
Start: 2024-01-08

## 2024-01-08 RX ORDER — ROSUVASTATIN CALCIUM 5 MG/1
5 TABLET, COATED ORAL DAILY
Qty: 90 TABLET | Refills: 0 | Status: SHIPPED | OUTPATIENT
Start: 2024-01-08

## 2024-01-08 RX ORDER — TRAMADOL HYDROCHLORIDE 50 MG/1
TABLET ORAL
Qty: 180 TABLET | Refills: 1 | Status: SHIPPED | OUTPATIENT
Start: 2024-01-08

## 2024-01-08 RX ORDER — FENOFIBRATE 145 MG/1
145 TABLET, COATED ORAL DAILY
Qty: 90 TABLET | Refills: 0 | Status: SHIPPED | OUTPATIENT
Start: 2024-01-08

## 2024-01-08 RX ORDER — GABAPENTIN 300 MG/1
300 CAPSULE ORAL 2 TIMES DAILY
Qty: 180 CAPSULE | Refills: 0 | Status: SHIPPED | OUTPATIENT
Start: 2024-01-08

## 2024-01-08 RX ORDER — ESCITALOPRAM OXALATE 10 MG/1
10 TABLET ORAL DAILY
Qty: 90 TABLET | Refills: 0 | Status: SHIPPED | OUTPATIENT
Start: 2024-01-08

## 2024-01-08 RX ORDER — BUPROPION HYDROCHLORIDE 75 MG/1
75 TABLET ORAL 2 TIMES DAILY
Qty: 180 TABLET | Refills: 0 | Status: SHIPPED | OUTPATIENT
Start: 2024-01-08

## 2024-01-08 RX ORDER — LORAZEPAM 0.5 MG/1
TABLET ORAL
Qty: 60 TABLET | Refills: 1 | Status: SHIPPED | OUTPATIENT
Start: 2024-01-08

## 2024-01-08 RX ORDER — TRAZODONE HYDROCHLORIDE 50 MG/1
100 TABLET ORAL
Qty: 180 TABLET | Refills: 0 | Status: SHIPPED | OUTPATIENT
Start: 2024-01-08

## 2024-01-31 DIAGNOSIS — M79.18 MYOFASCIAL PAIN SYNDROME: ICD-10-CM

## 2024-01-31 DIAGNOSIS — M51.36 BULGING LUMBAR DISC: ICD-10-CM

## 2024-01-31 DIAGNOSIS — F51.05 INSOMNIA DUE TO OTHER MENTAL DISORDER: ICD-10-CM

## 2024-01-31 DIAGNOSIS — F99 INSOMNIA DUE TO OTHER MENTAL DISORDER: ICD-10-CM

## 2024-01-31 DIAGNOSIS — F41.1 GENERALIZED ANXIETY DISORDER: ICD-10-CM

## 2024-02-01 RX ORDER — TRAMADOL HYDROCHLORIDE 50 MG/1
TABLET ORAL
Qty: 180 TABLET | Refills: 0 | Status: SHIPPED | OUTPATIENT
Start: 2024-02-01

## 2024-02-01 RX ORDER — LORAZEPAM 0.5 MG/1
TABLET ORAL
Qty: 60 TABLET | Refills: 0 | Status: SHIPPED | OUTPATIENT
Start: 2024-02-01 | End: 2024-02-06

## 2024-02-06 DIAGNOSIS — F99 INSOMNIA DUE TO OTHER MENTAL DISORDER: ICD-10-CM

## 2024-02-06 DIAGNOSIS — F41.1 GENERALIZED ANXIETY DISORDER: ICD-10-CM

## 2024-02-06 DIAGNOSIS — F51.05 INSOMNIA DUE TO OTHER MENTAL DISORDER: ICD-10-CM

## 2024-02-06 RX ORDER — LORAZEPAM 0.5 MG/1
TABLET ORAL
Qty: 60 TABLET | Refills: 1 | Status: SHIPPED | OUTPATIENT
Start: 2024-02-06

## 2024-03-11 ENCOUNTER — APPOINTMENT (OUTPATIENT)
Dept: RADIOLOGY | Facility: CLINIC | Age: 49
End: 2024-03-11
Payer: COMMERCIAL

## 2024-03-11 DIAGNOSIS — M54.50 ACUTE LOW BACK PAIN, UNSPECIFIED BACK PAIN LATERALITY, UNSPECIFIED WHETHER SCIATICA PRESENT: ICD-10-CM

## 2024-03-11 DIAGNOSIS — M54.50 LOW BACK PAIN, UNSPECIFIED BACK PAIN LATERALITY, UNSPECIFIED CHRONICITY, UNSPECIFIED WHETHER SCIATICA PRESENT: ICD-10-CM

## 2024-03-11 DIAGNOSIS — M54.2 CERVICALGIA: ICD-10-CM

## 2024-03-11 PROCEDURE — 72170 X-RAY EXAM OF PELVIS: CPT

## 2024-03-11 PROCEDURE — 72110 X-RAY EXAM L-2 SPINE 4/>VWS: CPT

## 2024-03-11 PROCEDURE — 72050 X-RAY EXAM NECK SPINE 4/5VWS: CPT

## 2024-04-01 DIAGNOSIS — M51.36 BULGING LUMBAR DISC: ICD-10-CM

## 2024-04-01 DIAGNOSIS — M79.18 MYOFASCIAL PAIN SYNDROME: ICD-10-CM

## 2024-04-01 DIAGNOSIS — F41.1 GENERALIZED ANXIETY DISORDER: ICD-10-CM

## 2024-04-01 DIAGNOSIS — I10 PRIMARY HYPERTENSION: ICD-10-CM

## 2024-04-01 DIAGNOSIS — F51.05 INSOMNIA DUE TO OTHER MENTAL DISORDER: ICD-10-CM

## 2024-04-01 DIAGNOSIS — R20.0 NUMBNESS: ICD-10-CM

## 2024-04-01 DIAGNOSIS — F33.2 SEVERE RECURRENT MAJOR DEPRESSION WITHOUT PSYCHOTIC FEATURES (HCC): ICD-10-CM

## 2024-04-01 DIAGNOSIS — F99 INSOMNIA DUE TO OTHER MENTAL DISORDER: ICD-10-CM

## 2024-04-01 DIAGNOSIS — E78.5 HYPERLIPIDEMIA, UNSPECIFIED HYPERLIPIDEMIA TYPE: ICD-10-CM

## 2024-04-01 RX ORDER — TRAZODONE HYDROCHLORIDE 50 MG/1
100 TABLET ORAL
Qty: 180 TABLET | Refills: 1 | Status: SHIPPED | OUTPATIENT
Start: 2024-04-01

## 2024-04-01 RX ORDER — TRAMADOL HYDROCHLORIDE 50 MG/1
TABLET ORAL
Qty: 180 TABLET | Refills: 0 | Status: SHIPPED | OUTPATIENT
Start: 2024-04-01

## 2024-04-01 RX ORDER — ESCITALOPRAM OXALATE 10 MG/1
10 TABLET ORAL DAILY
Qty: 90 TABLET | Refills: 1 | Status: SHIPPED | OUTPATIENT
Start: 2024-04-01

## 2024-04-01 RX ORDER — VALSARTAN 160 MG/1
160 TABLET ORAL DAILY
Qty: 90 TABLET | Refills: 1 | Status: SHIPPED | OUTPATIENT
Start: 2024-04-01

## 2024-04-01 RX ORDER — ROSUVASTATIN CALCIUM 5 MG/1
5 TABLET, COATED ORAL DAILY
Qty: 90 TABLET | Refills: 0 | Status: SHIPPED | OUTPATIENT
Start: 2024-04-01

## 2024-04-01 RX ORDER — LORAZEPAM 0.5 MG/1
TABLET ORAL
Qty: 60 TABLET | Refills: 0 | Status: SHIPPED | OUTPATIENT
Start: 2024-04-01

## 2024-04-01 RX ORDER — FENOFIBRATE 145 MG/1
145 TABLET, COATED ORAL DAILY
Qty: 90 TABLET | Refills: 1 | Status: SHIPPED | OUTPATIENT
Start: 2024-04-01

## 2024-04-01 RX ORDER — GABAPENTIN 300 MG/1
300 CAPSULE ORAL 2 TIMES DAILY
Qty: 180 CAPSULE | Refills: 1 | Status: SHIPPED | OUTPATIENT
Start: 2024-04-01

## 2024-04-01 RX ORDER — BUPROPION HYDROCHLORIDE 75 MG/1
75 TABLET ORAL 2 TIMES DAILY
Qty: 180 TABLET | Refills: 1 | Status: SHIPPED | OUTPATIENT
Start: 2024-04-01

## 2024-04-12 LAB
ALBUMIN SERPL-MCNC: 5 G/DL (ref 4.1–5.1)
ALBUMIN/GLOB SERPL: 1.7 {RATIO} (ref 1.2–2.2)
ALP SERPL-CCNC: 71 IU/L (ref 44–121)
ALT SERPL-CCNC: 30 IU/L (ref 0–44)
AST SERPL-CCNC: 28 IU/L (ref 0–40)
BILIRUB SERPL-MCNC: 0.4 MG/DL (ref 0–1.2)
BUN SERPL-MCNC: 12 MG/DL (ref 6–24)
BUN/CREAT SERPL: 10 (ref 9–20)
CALCIUM SERPL-MCNC: 10.1 MG/DL (ref 8.7–10.2)
CHLORIDE SERPL-SCNC: 98 MMOL/L (ref 96–106)
CO2 SERPL-SCNC: 24 MMOL/L (ref 20–29)
CREAT SERPL-MCNC: 1.19 MG/DL (ref 0.76–1.27)
EGFR: 74 ML/MIN/1.73
ERYTHROCYTE [DISTWIDTH] IN BLOOD BY AUTOMATED COUNT: 12.5 % (ref 11.6–15.4)
GLOBULIN SER-MCNC: 2.9 G/DL (ref 1.5–4.5)
GLUCOSE SERPL-MCNC: 107 MG/DL (ref 70–99)
HCT VFR BLD AUTO: 45.9 % (ref 37.5–51)
HGB BLD-MCNC: 14.9 G/DL (ref 13–17.7)
MAGNESIUM SERPL-MCNC: 2 MG/DL (ref 1.6–2.3)
MCH RBC QN AUTO: 28.3 PG (ref 26.6–33)
MCHC RBC AUTO-ENTMCNC: 32.5 G/DL (ref 31.5–35.7)
MCV RBC AUTO: 87 FL (ref 79–97)
PLATELET # BLD AUTO: 333 X10E3/UL (ref 150–450)
POTASSIUM SERPL-SCNC: 4.3 MMOL/L (ref 3.5–5.2)
PROT SERPL-MCNC: 7.9 G/DL (ref 6–8.5)
RBC # BLD AUTO: 5.27 X10E6/UL (ref 4.14–5.8)
SODIUM SERPL-SCNC: 138 MMOL/L (ref 134–144)
WBC # BLD AUTO: 8.4 X10E3/UL (ref 3.4–10.8)

## 2024-04-16 ENCOUNTER — OFFICE VISIT (OUTPATIENT)
Dept: FAMILY MEDICINE CLINIC | Facility: CLINIC | Age: 49
End: 2024-04-16
Payer: COMMERCIAL

## 2024-04-16 VITALS
OXYGEN SATURATION: 96 % | BODY MASS INDEX: 34.52 KG/M2 | SYSTOLIC BLOOD PRESSURE: 142 MMHG | TEMPERATURE: 97.8 F | HEART RATE: 68 BPM | WEIGHT: 298.4 LBS | HEIGHT: 78 IN | RESPIRATION RATE: 16 BRPM | DIASTOLIC BLOOD PRESSURE: 84 MMHG

## 2024-04-16 DIAGNOSIS — Z00.00 ENCOUNTER FOR WELL ADULT EXAM WITHOUT ABNORMAL FINDINGS: Primary | ICD-10-CM

## 2024-04-16 DIAGNOSIS — G89.29 CHRONIC INTRACTABLE HEADACHE, UNSPECIFIED HEADACHE TYPE: ICD-10-CM

## 2024-04-16 DIAGNOSIS — R51.9 CHRONIC INTRACTABLE HEADACHE, UNSPECIFIED HEADACHE TYPE: ICD-10-CM

## 2024-04-16 DIAGNOSIS — G43.711 CHRONIC MIGRAINE WITHOUT AURA, INTRACTABLE, WITH STATUS MIGRAINOSUS: ICD-10-CM

## 2024-04-16 PROBLEM — K62.5 RECTAL BLEEDING: Status: RESOLVED | Noted: 2021-01-23 | Resolved: 2024-04-16

## 2024-04-16 PROCEDURE — 99396 PREV VISIT EST AGE 40-64: CPT | Performed by: FAMILY MEDICINE

## 2024-04-16 NOTE — PROGRESS NOTES
Assessment/Plan:     Diagnoses and all orders for this visit:    Encounter for well adult exam without abnormal findings    Chronic intractable headache, unspecified headache type  -     CT head wo contrast; Future  -     rimegepant sulfate (NURTEC) 75 mg TBDP; Take 1 tablet (75 mg total) by mouth once for 1 dose Take 1 every other day for migraine prevention  -     Ambulatory Referral to Neurology; Future    Chronic migraine without aura, intractable, with status migrainosus  -     CT head wo contrast; Future  -     rimegepant sulfate (NURTEC) 75 mg TBDP; Take 1 tablet (75 mg total) by mouth once for 1 dose Take 1 every other day for migraine prevention  -     Ambulatory Referral to Neurology; Future     Labs reviewed  Patient will continue current medications  Up-to-date on health maintenance  Will need a lipid profile PSA at next blood work  CT of head ordered  Patient referred to neurology  Will try Nurtec for his headaches  Follow-up in 3 to 6 months or sooner if needed      Subjective:     Chief Complaint   Patient presents with    Annual Exam        Patient ID: Joshua Caballero is a 48 y.o. male.    Patient presents today for yearly physical  He is having significant issues with chronic headaches  Patient has had a chronic headache now for the last 3 weeks he says most of the time he just has significant severe headaches  Headaches got worse about 4 to 5 months ago and have been pretty much daily and constant since then patient was on Imitrex in the past that did not seem to help  No other acute complaints today        The following portions of the patient's history were reviewed and updated as appropriate: allergies, current medications, past family history, past medical history, past social history, past surgical history and problem list.    Review of Systems   Constitutional: Negative.    HENT: Negative.     Eyes: Negative.    Respiratory: Negative.     Cardiovascular: Negative.    Gastrointestinal:  "Negative.    Endocrine: Negative.    Genitourinary: Negative.    Musculoskeletal: Negative.    Skin: Negative.    Allergic/Immunologic: Negative.    Neurological: Negative.    Hematological: Negative.    Psychiatric/Behavioral: Negative.     All other systems reviewed and are negative.        Objective:    Vitals:    04/16/24 1623   BP: 142/84   BP Location: Left arm   Patient Position: Sitting   Cuff Size: Large   Pulse: 68   Resp: 16   Temp: 97.8 °F (36.6 °C)   TempSrc: Tympanic   SpO2: 96%   Weight: 135 kg (298 lb 6.4 oz)   Height: 6' 6\" (1.981 m)          Physical Exam  Vitals and nursing note reviewed.   Constitutional:       Appearance: Normal appearance.   HENT:      Head: Normocephalic.      Right Ear: Tympanic membrane, ear canal and external ear normal.      Left Ear: Tympanic membrane, ear canal and external ear normal.      Nose: Nose normal.      Mouth/Throat:      Mouth: Mucous membranes are moist.   Eyes:      Conjunctiva/sclera: Conjunctivae normal.      Pupils: Pupils are equal, round, and reactive to light.   Cardiovascular:      Rate and Rhythm: Normal rate and regular rhythm.   Pulmonary:      Effort: Pulmonary effort is normal.      Breath sounds: Normal breath sounds.   Abdominal:      General: Abdomen is flat. Bowel sounds are normal.      Palpations: Abdomen is soft.   Musculoskeletal:         General: Normal range of motion.   Skin:     General: Skin is warm and dry.   Neurological:      General: No focal deficit present.      Mental Status: He is alert and oriented to person, place, and time. Mental status is at baseline.   Psychiatric:         Mood and Affect: Mood normal.         Behavior: Behavior normal.         Thought Content: Thought content normal.         Judgment: Judgment normal.         "

## 2024-04-22 ENCOUNTER — DOCUMENTATION (OUTPATIENT)
Dept: FAMILY MEDICINE CLINIC | Facility: CLINIC | Age: 49
End: 2024-04-22

## 2024-04-26 DIAGNOSIS — R20.0 NUMBNESS: ICD-10-CM

## 2024-04-26 DIAGNOSIS — I10 PRIMARY HYPERTENSION: ICD-10-CM

## 2024-04-26 DIAGNOSIS — F51.05 INSOMNIA DUE TO OTHER MENTAL DISORDER: ICD-10-CM

## 2024-04-26 DIAGNOSIS — F33.2 SEVERE RECURRENT MAJOR DEPRESSION WITHOUT PSYCHOTIC FEATURES (HCC): ICD-10-CM

## 2024-04-26 DIAGNOSIS — F41.1 GENERALIZED ANXIETY DISORDER: ICD-10-CM

## 2024-04-26 DIAGNOSIS — M79.18 MYOFASCIAL PAIN SYNDROME: ICD-10-CM

## 2024-04-26 DIAGNOSIS — M51.36 BULGING LUMBAR DISC: ICD-10-CM

## 2024-04-26 DIAGNOSIS — F99 INSOMNIA DUE TO OTHER MENTAL DISORDER: ICD-10-CM

## 2024-04-26 DIAGNOSIS — E78.5 HYPERLIPIDEMIA, UNSPECIFIED HYPERLIPIDEMIA TYPE: ICD-10-CM

## 2024-04-27 RX ORDER — ROSUVASTATIN CALCIUM 5 MG/1
5 TABLET, COATED ORAL DAILY
Qty: 30 TABLET | Refills: 0 | Status: SHIPPED | OUTPATIENT
Start: 2024-04-27

## 2024-04-27 RX ORDER — FENOFIBRATE 145 MG/1
145 TABLET, COATED ORAL DAILY
Qty: 30 TABLET | Refills: 0 | Status: SHIPPED | OUTPATIENT
Start: 2024-04-27

## 2024-04-27 RX ORDER — BUPROPION HYDROCHLORIDE 75 MG/1
75 TABLET ORAL 2 TIMES DAILY
Qty: 180 TABLET | Refills: 1 | Status: SHIPPED | OUTPATIENT
Start: 2024-04-27

## 2024-04-27 RX ORDER — GABAPENTIN 300 MG/1
300 CAPSULE ORAL 2 TIMES DAILY
Qty: 180 CAPSULE | Refills: 1 | Status: SHIPPED | OUTPATIENT
Start: 2024-04-27

## 2024-04-27 RX ORDER — ESCITALOPRAM OXALATE 10 MG/1
10 TABLET ORAL DAILY
Qty: 90 TABLET | Refills: 1 | Status: SHIPPED | OUTPATIENT
Start: 2024-04-27

## 2024-04-27 RX ORDER — TRAZODONE HYDROCHLORIDE 50 MG/1
100 TABLET ORAL
Qty: 180 TABLET | Refills: 1 | Status: SHIPPED | OUTPATIENT
Start: 2024-04-27

## 2024-04-27 RX ORDER — VALSARTAN 160 MG/1
160 TABLET ORAL DAILY
Qty: 90 TABLET | Refills: 1 | Status: SHIPPED | OUTPATIENT
Start: 2024-04-27

## 2024-04-29 RX ORDER — LORAZEPAM 0.5 MG/1
TABLET ORAL
Qty: 60 TABLET | Refills: 0 | Status: SHIPPED | OUTPATIENT
Start: 2024-04-29

## 2024-04-29 RX ORDER — TRAMADOL HYDROCHLORIDE 50 MG/1
TABLET ORAL
Qty: 180 TABLET | Refills: 0 | Status: SHIPPED | OUTPATIENT
Start: 2024-04-29

## 2024-05-28 DIAGNOSIS — E78.5 HYPERLIPIDEMIA, UNSPECIFIED HYPERLIPIDEMIA TYPE: ICD-10-CM

## 2024-05-28 DIAGNOSIS — F41.1 GENERALIZED ANXIETY DISORDER: ICD-10-CM

## 2024-05-28 DIAGNOSIS — F99 INSOMNIA DUE TO OTHER MENTAL DISORDER: ICD-10-CM

## 2024-05-28 DIAGNOSIS — I10 PRIMARY HYPERTENSION: ICD-10-CM

## 2024-05-28 DIAGNOSIS — F51.05 INSOMNIA DUE TO OTHER MENTAL DISORDER: ICD-10-CM

## 2024-05-28 DIAGNOSIS — R20.0 NUMBNESS: ICD-10-CM

## 2024-05-28 DIAGNOSIS — M79.18 MYOFASCIAL PAIN SYNDROME: ICD-10-CM

## 2024-05-28 DIAGNOSIS — M51.36 BULGING LUMBAR DISC: ICD-10-CM

## 2024-05-28 DIAGNOSIS — F33.2 SEVERE RECURRENT MAJOR DEPRESSION WITHOUT PSYCHOTIC FEATURES (HCC): ICD-10-CM

## 2024-05-29 RX ORDER — TRAMADOL HYDROCHLORIDE 50 MG/1
TABLET ORAL
Qty: 180 TABLET | Refills: 0 | Status: SHIPPED | OUTPATIENT
Start: 2024-05-29

## 2024-05-29 RX ORDER — LORAZEPAM 0.5 MG/1
TABLET ORAL
Qty: 60 TABLET | Refills: 0 | Status: SHIPPED | OUTPATIENT
Start: 2024-05-29

## 2024-05-29 RX ORDER — VALSARTAN 160 MG/1
160 TABLET ORAL DAILY
Qty: 90 TABLET | Refills: 0 | Status: SHIPPED | OUTPATIENT
Start: 2024-05-29

## 2024-05-29 RX ORDER — TRAZODONE HYDROCHLORIDE 50 MG/1
100 TABLET ORAL
Qty: 180 TABLET | Refills: 0 | Status: SHIPPED | OUTPATIENT
Start: 2024-05-29

## 2024-05-29 RX ORDER — ESCITALOPRAM OXALATE 10 MG/1
10 TABLET ORAL DAILY
Qty: 90 TABLET | Refills: 0 | Status: SHIPPED | OUTPATIENT
Start: 2024-05-29

## 2024-05-29 RX ORDER — GABAPENTIN 300 MG/1
300 CAPSULE ORAL 2 TIMES DAILY
Qty: 180 CAPSULE | Refills: 0 | Status: SHIPPED | OUTPATIENT
Start: 2024-05-29

## 2024-06-07 RX ORDER — FENOFIBRATE 145 MG/1
145 TABLET, COATED ORAL DAILY
Qty: 90 TABLET | Refills: 1 | Status: SHIPPED | OUTPATIENT
Start: 2024-06-07

## 2024-06-07 RX ORDER — BUPROPION HYDROCHLORIDE 75 MG/1
75 TABLET ORAL 2 TIMES DAILY
Qty: 180 TABLET | Refills: 1 | Status: SHIPPED | OUTPATIENT
Start: 2024-06-07

## 2024-06-07 RX ORDER — LORAZEPAM 0.5 MG/1
TABLET ORAL
Qty: 60 TABLET | Refills: 0 | Status: CANCELLED | OUTPATIENT
Start: 2024-06-07

## 2024-06-07 RX ORDER — ROSUVASTATIN CALCIUM 5 MG/1
5 TABLET, COATED ORAL DAILY
Qty: 90 TABLET | Refills: 1 | Status: SHIPPED | OUTPATIENT
Start: 2024-06-07

## 2024-06-07 RX ORDER — TRAMADOL HYDROCHLORIDE 50 MG/1
TABLET ORAL
Qty: 180 TABLET | Refills: 0 | Status: CANCELLED | OUTPATIENT
Start: 2024-06-07

## 2024-06-19 DIAGNOSIS — M79.18 MYOFASCIAL PAIN SYNDROME: ICD-10-CM

## 2024-06-19 DIAGNOSIS — M51.36 BULGING LUMBAR DISC: ICD-10-CM

## 2024-06-20 RX ORDER — TRAMADOL HYDROCHLORIDE 50 MG/1
TABLET ORAL
Qty: 180 TABLET | Refills: 0 | Status: SHIPPED | OUTPATIENT
Start: 2024-06-20

## 2024-07-11 DIAGNOSIS — F99 INSOMNIA DUE TO OTHER MENTAL DISORDER: ICD-10-CM

## 2024-07-11 DIAGNOSIS — M51.36 BULGING LUMBAR DISC: ICD-10-CM

## 2024-07-11 DIAGNOSIS — F41.1 GENERALIZED ANXIETY DISORDER: ICD-10-CM

## 2024-07-11 DIAGNOSIS — F51.05 INSOMNIA DUE TO OTHER MENTAL DISORDER: ICD-10-CM

## 2024-07-11 DIAGNOSIS — M79.18 MYOFASCIAL PAIN SYNDROME: ICD-10-CM

## 2024-07-11 RX ORDER — TRAMADOL HYDROCHLORIDE 50 MG/1
TABLET ORAL
Qty: 180 TABLET | Refills: 0 | Status: SHIPPED | OUTPATIENT
Start: 2024-07-11

## 2024-07-11 RX ORDER — LORAZEPAM 0.5 MG/1
TABLET ORAL
Qty: 60 TABLET | Refills: 0 | Status: SHIPPED | OUTPATIENT
Start: 2024-07-11

## 2024-07-12 DIAGNOSIS — E78.5 HYPERLIPIDEMIA, UNSPECIFIED HYPERLIPIDEMIA TYPE: Primary | ICD-10-CM

## 2024-08-04 DIAGNOSIS — F51.05 INSOMNIA DUE TO OTHER MENTAL DISORDER: ICD-10-CM

## 2024-08-04 DIAGNOSIS — F41.1 GENERALIZED ANXIETY DISORDER: ICD-10-CM

## 2024-08-04 DIAGNOSIS — F99 INSOMNIA DUE TO OTHER MENTAL DISORDER: ICD-10-CM

## 2024-08-05 RX ORDER — LORAZEPAM 0.5 MG/1
TABLET ORAL
Qty: 60 TABLET | Refills: 1 | Status: SHIPPED | OUTPATIENT
Start: 2024-08-05

## 2024-08-17 LAB
CHOLEST SERPL-MCNC: 132 MG/DL (ref 100–199)
CHOLEST/HDLC SERPL: 4.1 RATIO (ref 0–5)
HDLC SERPL-MCNC: 32 MG/DL
LDL CALC COMMENT: ABNORMAL
LDLC SERPL CALC-MCNC: 65 MG/DL (ref 0–99)
LDLC SERPL DIRECT ASSAY-MCNC: 58 MG/DL (ref 0–99)
SL AMB VLDL CHOLESTEROL CALC: 35 MG/DL (ref 5–40)
TRIGL SERPL-MCNC: 212 MG/DL (ref 0–149)

## 2024-08-27 DIAGNOSIS — M51.36 BULGING LUMBAR DISC: ICD-10-CM

## 2024-08-27 DIAGNOSIS — M79.18 MYOFASCIAL PAIN SYNDROME: ICD-10-CM

## 2024-08-28 RX ORDER — TRAMADOL HYDROCHLORIDE 50 MG/1
TABLET ORAL
Qty: 180 TABLET | Refills: 0 | Status: SHIPPED | OUTPATIENT
Start: 2024-08-28

## 2024-09-16 DIAGNOSIS — M79.18 MYOFASCIAL PAIN SYNDROME: ICD-10-CM

## 2024-09-16 DIAGNOSIS — M51.36 BULGING LUMBAR DISC: ICD-10-CM

## 2024-09-17 DIAGNOSIS — F99 INSOMNIA DUE TO OTHER MENTAL DISORDER: ICD-10-CM

## 2024-09-17 DIAGNOSIS — F51.05 INSOMNIA DUE TO OTHER MENTAL DISORDER: ICD-10-CM

## 2024-09-17 RX ORDER — TRAMADOL HYDROCHLORIDE 50 MG/1
50 TABLET ORAL EVERY 6 HOURS PRN
Qty: 180 TABLET | Refills: 0 | Status: SHIPPED | OUTPATIENT
Start: 2024-09-17

## 2024-09-17 RX ORDER — TRAZODONE HYDROCHLORIDE 50 MG/1
100 TABLET, FILM COATED ORAL
Qty: 200 TABLET | Refills: 1 | Status: SHIPPED | OUTPATIENT
Start: 2024-09-17

## 2024-09-28 DIAGNOSIS — M79.18 MYOFASCIAL PAIN SYNDROME: ICD-10-CM

## 2024-09-28 DIAGNOSIS — F41.1 GENERALIZED ANXIETY DISORDER: ICD-10-CM

## 2024-09-28 DIAGNOSIS — M51.369 BULGING LUMBAR DISC: ICD-10-CM

## 2024-09-28 DIAGNOSIS — F51.05 INSOMNIA DUE TO OTHER MENTAL DISORDER: ICD-10-CM

## 2024-09-28 DIAGNOSIS — F99 INSOMNIA DUE TO OTHER MENTAL DISORDER: ICD-10-CM

## 2024-09-30 RX ORDER — LORAZEPAM 0.5 MG/1
TABLET ORAL
Qty: 60 TABLET | Refills: 0 | Status: SHIPPED | OUTPATIENT
Start: 2024-09-30

## 2024-10-15 RX ORDER — TRAMADOL HYDROCHLORIDE 50 MG/1
50 TABLET ORAL EVERY 6 HOURS PRN
Qty: 180 TABLET | Refills: 0 | Status: SHIPPED | OUTPATIENT
Start: 2024-10-15

## 2024-10-15 RX ORDER — LORAZEPAM 0.5 MG/1
TABLET ORAL
Qty: 60 TABLET | OUTPATIENT
Start: 2024-10-15

## 2024-10-29 ENCOUNTER — OFFICE VISIT (OUTPATIENT)
Dept: FAMILY MEDICINE CLINIC | Facility: CLINIC | Age: 49
End: 2024-10-29
Payer: COMMERCIAL

## 2024-10-29 VITALS
DIASTOLIC BLOOD PRESSURE: 86 MMHG | TEMPERATURE: 98.4 F | OXYGEN SATURATION: 94 % | BODY MASS INDEX: 34.43 KG/M2 | RESPIRATION RATE: 16 BRPM | WEIGHT: 297.6 LBS | HEIGHT: 78 IN | HEART RATE: 75 BPM | SYSTOLIC BLOOD PRESSURE: 138 MMHG

## 2024-10-29 DIAGNOSIS — R53.82 CHRONIC FATIGUE: ICD-10-CM

## 2024-10-29 DIAGNOSIS — Z12.5 SCREENING FOR PROSTATE CANCER: ICD-10-CM

## 2024-10-29 DIAGNOSIS — E78.5 HYPERLIPIDEMIA, UNSPECIFIED HYPERLIPIDEMIA TYPE: ICD-10-CM

## 2024-10-29 DIAGNOSIS — R73.01 IMPAIRED FASTING GLUCOSE: ICD-10-CM

## 2024-10-29 DIAGNOSIS — M79.671 PAIN IN BOTH FEET: Primary | ICD-10-CM

## 2024-10-29 DIAGNOSIS — G62.9 PERIPHERAL POLYNEUROPATHY: ICD-10-CM

## 2024-10-29 DIAGNOSIS — M79.672 PAIN IN BOTH FEET: Primary | ICD-10-CM

## 2024-10-29 DIAGNOSIS — M13.0 POLYARTHROPATHY: ICD-10-CM

## 2024-10-29 PROCEDURE — 99214 OFFICE O/P EST MOD 30 MIN: CPT | Performed by: FAMILY MEDICINE

## 2024-10-29 NOTE — PROGRESS NOTES
Ambulatory Visit  Name: Joshua Caballero      : 1975      MRN: [id number]  Encounter Provider: Uriel Dennis DO  Encounter Date: 10/29/2024   Encounter department: [facility]    Assessment & Plan  Pain in both feet    Orders:    Ambulatory Referral to Podiatry; Future    Peripheral polyneuropathy    Orders:    KACIE w/Reflex if Positive; Future    Rheumatoid Arthritis Factor; Future    Uric acid; Future    C-reactive protein; Future    Cyclic citrul peptide antibody, IgG; Future    Vitamin B12; Future    KACIE w/Reflex if Positive    Rheumatoid Arthritis Factor    Uric acid    C-reactive protein    Vitamin B12    Polyarthropathy    Orders:    KACIE w/Reflex if Positive; Future    Rheumatoid Arthritis Factor; Future    Uric acid; Future    C-reactive protein; Future    Cyclic citrul peptide antibody, IgG; Future    KACIE w/Reflex if Positive    Rheumatoid Arthritis Factor    Uric acid    C-reactive protein    Screening for prostate cancer    Orders:    PSA Total (Reflex To Free); Future    UA (URINE) with reflex to Scope; Future    PSA Total (Reflex To Free)    UA (URINE) with reflex to Scope    Impaired fasting glucose    Orders:    Comprehensive metabolic panel; Future    Hemoglobin A1C; Future    Comprehensive metabolic panel    Hemoglobin A1C    Chronic fatigue    Orders:    CBC; Future    TSH, 3rd generation; Future    CBC    TSH, 3rd generation    Hyperlipidemia, unspecified hyperlipidemia type    Orders:    Lipid panel; Future    Lipid panel      Overall patient stable  Labs ordered  Patient will continue his current medications I referred to podiatry  Can follow-up in 6 months or sooner if needed       History of Present Illness     Patient presents today for 6-month check of chronic dyspnea  Overall he is stable and at baseline  He would like labs to look into his joint pains otherwise his meds are stable and he has no other issues today  Patient is also have any worsening foot  "pain  He would like to see podiatry    Hypertension          Review of Systems   Constitutional: Negative.    HENT: Negative.     Eyes: Negative.    Respiratory: Negative.     Cardiovascular: Negative.    Gastrointestinal: Negative.    Endocrine: Negative.    Genitourinary: Negative.    Musculoskeletal: Negative.    Skin: Negative.    Allergic/Immunologic: Negative.    Neurological: Negative.    Hematological: Negative.    Psychiatric/Behavioral: Negative.     All other systems reviewed and are negative.          Objective     /90 (BP Location: Left arm, Patient Position: Sitting, Cuff Size: Large)   Pulse 75   Temp 98.4 °F (36.9 °C) (Tympanic)   Resp 16   Ht 6' 6\" (1.981 m)   Wt 135 kg (297 lb 9.6 oz)   SpO2 94%   BMI 34.39 kg/m²     Physical Exam  Vitals and nursing note reviewed.   Constitutional:       Appearance: Normal appearance. He is well-developed.   HENT:      Head: Normocephalic.      Right Ear: External ear normal.      Left Ear: External ear normal.      Nose: Nose normal.   Eyes:      Conjunctiva/sclera: Conjunctivae normal.      Pupils: Pupils are equal, round, and reactive to light.   Cardiovascular:      Rate and Rhythm: Normal rate and regular rhythm.      Heart sounds: Normal heart sounds.   Pulmonary:      Effort: Pulmonary effort is normal.      Breath sounds: Normal breath sounds.   Abdominal:      General: Bowel sounds are normal.      Palpations: Abdomen is soft.   Musculoskeletal:         General: Normal range of motion.      Cervical back: Normal range of motion and neck supple.   Skin:     General: Skin is warm and dry.   Neurological:      General: No focal deficit present.      Mental Status: He is alert and oriented to person, place, and time.   Psychiatric:         Behavior: Behavior normal.         Thought Content: Thought content normal.         Judgment: Judgment normal.         "

## 2024-11-05 DIAGNOSIS — M51.369 BULGING LUMBAR DISC: ICD-10-CM

## 2024-11-05 DIAGNOSIS — F51.05 INSOMNIA DUE TO OTHER MENTAL DISORDER: ICD-10-CM

## 2024-11-05 DIAGNOSIS — M79.18 MYOFASCIAL PAIN SYNDROME: ICD-10-CM

## 2024-11-05 DIAGNOSIS — I10 PRIMARY HYPERTENSION: ICD-10-CM

## 2024-11-05 DIAGNOSIS — F99 INSOMNIA DUE TO OTHER MENTAL DISORDER: ICD-10-CM

## 2024-11-05 DIAGNOSIS — F33.2 SEVERE RECURRENT MAJOR DEPRESSION WITHOUT PSYCHOTIC FEATURES (HCC): ICD-10-CM

## 2024-11-05 DIAGNOSIS — F41.1 GENERALIZED ANXIETY DISORDER: ICD-10-CM

## 2024-11-05 DIAGNOSIS — R20.0 NUMBNESS: ICD-10-CM

## 2024-11-05 DIAGNOSIS — E78.5 HYPERLIPIDEMIA, UNSPECIFIED HYPERLIPIDEMIA TYPE: ICD-10-CM

## 2024-11-06 RX ORDER — ESCITALOPRAM OXALATE 10 MG/1
10 TABLET ORAL DAILY
Qty: 100 TABLET | Refills: 1 | Status: SHIPPED | OUTPATIENT
Start: 2024-11-06

## 2024-11-06 RX ORDER — LORAZEPAM 0.5 MG/1
TABLET ORAL
Qty: 60 TABLET | Refills: 0 | Status: CANCELLED | OUTPATIENT
Start: 2024-11-06

## 2024-11-06 RX ORDER — FENOFIBRATE 145 MG/1
145 TABLET, COATED ORAL DAILY
Qty: 100 TABLET | Refills: 1 | Status: SHIPPED | OUTPATIENT
Start: 2024-11-06

## 2024-11-06 RX ORDER — BUPROPION HYDROCHLORIDE 75 MG/1
75 TABLET ORAL 2 TIMES DAILY
Qty: 200 TABLET | Refills: 1 | Status: SHIPPED | OUTPATIENT
Start: 2024-11-06

## 2024-11-06 RX ORDER — ROSUVASTATIN CALCIUM 5 MG/1
5 TABLET, COATED ORAL DAILY
Qty: 100 TABLET | Refills: 1 | Status: SHIPPED | OUTPATIENT
Start: 2024-11-06

## 2024-11-06 RX ORDER — TRAMADOL HYDROCHLORIDE 50 MG/1
50 TABLET ORAL EVERY 6 HOURS PRN
Qty: 120 TABLET | Refills: 0 | Status: SHIPPED | OUTPATIENT
Start: 2024-11-06

## 2024-11-06 RX ORDER — TRAZODONE HYDROCHLORIDE 50 MG/1
100 TABLET, FILM COATED ORAL
Qty: 200 TABLET | Refills: 1 | Status: SHIPPED | OUTPATIENT
Start: 2024-11-06

## 2024-11-06 RX ORDER — TRAMADOL HYDROCHLORIDE 50 MG/1
100 TABLET ORAL EVERY 4 HOURS
Qty: 180 TABLET | Refills: 0 | Status: SHIPPED | OUTPATIENT
Start: 2024-11-06

## 2024-11-06 RX ORDER — VALSARTAN 160 MG/1
160 TABLET ORAL DAILY
Qty: 100 TABLET | Refills: 1 | Status: SHIPPED | OUTPATIENT
Start: 2024-11-06

## 2024-11-06 RX ORDER — GABAPENTIN 300 MG/1
300 CAPSULE ORAL 2 TIMES DAILY
Qty: 200 CAPSULE | Refills: 1 | Status: SHIPPED | OUTPATIENT
Start: 2024-11-06

## 2024-11-06 RX ORDER — LORAZEPAM 0.5 MG/1
TABLET ORAL
Qty: 60 TABLET | Refills: 1 | Status: SHIPPED | OUTPATIENT
Start: 2024-11-06

## 2024-11-07 ENCOUNTER — OFFICE VISIT (OUTPATIENT)
Dept: PODIATRY | Facility: CLINIC | Age: 49
End: 2024-11-07
Payer: COMMERCIAL

## 2024-11-07 VITALS
WEIGHT: 297 LBS | SYSTOLIC BLOOD PRESSURE: 121 MMHG | BODY MASS INDEX: 34.36 KG/M2 | HEART RATE: 71 BPM | DIASTOLIC BLOOD PRESSURE: 84 MMHG | HEIGHT: 78 IN | TEMPERATURE: 97.9 F

## 2024-11-07 DIAGNOSIS — M54.16 LUMBAR RADICULOPATHY: ICD-10-CM

## 2024-11-07 DIAGNOSIS — M79.671 PAIN IN BOTH FEET: Primary | ICD-10-CM

## 2024-11-07 DIAGNOSIS — M79.672 PAIN IN BOTH FEET: Primary | ICD-10-CM

## 2024-11-07 PROCEDURE — 99203 OFFICE O/P NEW LOW 30 MIN: CPT | Performed by: PODIATRIST

## 2024-11-07 NOTE — PROGRESS NOTES
Patient ID: Joshua Caballero is a 49 y.o. male Date of Birth 1975       Chief Complaint   Patient presents with    New Patient Visit    Foot Problem     Neuropathy - bilateral                Diagnosis:  1. Pain in both feet  -     Ambulatory Referral to Podiatry  2. Lumbar radiculopathy       Initial pedal examination with socks and shoes removed bilaterally.  I personally viewed patient's medical records, PCP note from 10/29/2024, lumbar x-ray from 3/11/2024 showing Disc space narrowing seen at L5-S1, L4-5 L3-4 along with facet degenerative changes. Spondylotic changes at L5-S1, L4-5, L3-4. No evidence of spondylolysis.  Today discussed the biomechanics, etiology and treatment options for patient's significant pain and neuropathy of bilateral feet, likely related to spondylitic changes in his spine and L5-S1 which innervates lower extremities especially the feet.    We discussed referral to spine and pain versus neurosurgery referral, order placed spine and pain  I reached out to PCP to order lumbar MRI.  I explained to patient unfortunately his neuropathy is coming from his back and not related to his foot structure, although I did review proper shoe gear.  Patient will follow-up as needed, he understands and agrees with the plan.        Subjective:   Joshua presents today upon referral from PCP for evaluation and care of significant pain and neuropathy of bilateral feet.  Historically patient does have lumbar radiculopathy with bulging disks.  He was seen by PCP on 10/29/2024 at which time an arthritis panel was ordered for blood work which patient has not had done yet.  Historically patient has had lumbar x-ray in 3/11/2024 showing some degenerative changes of the spine.  He states he has had 3 different back surgeries, he knows he needs to have more, he does not currently follow with anyone for care of his back.        The following portions of the patient's history were reviewed and updated as  appropriate:     Past Medical History:   Diagnosis Date    Actinic keratosis     last assessed 10/14/13    Anxiety     Clotting disorder (HCC) 2015 5/30/23 patient denies    Depression     Headache(784.0)     Herniated nucleus pulposus, L5-S1, right     last assessed 1/21/13    Hyperlipidemia 1999    Hypertension 1997    Obesity     Scoliosis        Past Surgical History:   Procedure Laterality Date    BACK SURGERY      COLONOSCOPY      SPINE SURGERY  11/14/17    UPPER GASTROINTESTINAL ENDOSCOPY         Social History     Socioeconomic History    Marital status: /Civil Union     Spouse name: None    Number of children: None    Years of education: None    Highest education level: None   Occupational History    Occupation: full time   Tobacco Use    Smoking status: Never    Smokeless tobacco: Never   Vaping Use    Vaping status: Never Used   Substance and Sexual Activity    Alcohol use: Not Currently     Comment: social    Drug use: No    Sexual activity: Yes     Partners: Male   Other Topics Concern    None   Social History Narrative    Daily caffeine consumption    Inadequate exercise     Social Determinants of Health     Financial Resource Strain: Not on file   Food Insecurity: Not on file   Transportation Needs: Not on file   Physical Activity: Inactive (8/30/2022)    Exercise Vital Sign     Days of Exercise per Week: 0 days     Minutes of Exercise per Session: 0 min   Stress: No Stress Concern Present (8/30/2022)    [employer] - Occupational Stress Questionnaire     Feeling of Stress : Not at all   Social Connections: Not on file   Intimate Partner Violence: Not At Risk (10/11/2023)    Humiliation, Afraid, Rape, and Kick questionnaire     Fear of Current or Ex-Partner: No     Emotionally Abused: No     Physically Abused: No     Sexually Abused: No   Housing Stability: Not on file          Current Outpatient Medications:     buPROPion (WELLBUTRIN) 75 mg tablet, Take 1 tablet  (75 mg total) by mouth 2 (two) times a day, Disp: 200 tablet, Rfl: 1    escitalopram (LEXAPRO) 10 mg tablet, Take 1 tablet (10 mg total) by mouth daily, Disp: 100 tablet, Rfl: 1    fenofibrate (TRICOR) 145 mg tablet, Take 1 tablet (145 mg total) by mouth daily, Disp: 100 tablet, Rfl: 1    gabapentin (NEURONTIN) 300 mg capsule, Take 1 capsule (300 mg total) by mouth 2 (two) times a day, Disp: 200 capsule, Rfl: 1    LORazepam (ATIVAN) 0.5 mg tablet, TAKE 1 TO 2 TABLETS BY MOUTH AT  BEDTIME AS DIRECTED, Disp: 60 tablet, Rfl: 1    methocarbamol (ROBAXIN) 500 mg tablet, Take 1 tablet (500 mg total) by mouth 4 (four) times a day, Disp: 30 tablet, Rfl: 0    rosuvastatin (CRESTOR) 5 mg tablet, Take 1 tablet (5 mg total) by mouth daily, Disp: 100 tablet, Rfl: 1    traMADol (ULTRAM) 50 mg tablet, TAKE 1 TABLET BY MOUTH EVERY 6  HOURS AS NEEDED FOR MODERATE  PAIN, Disp: 120 tablet, Rfl: 0    traMADol (ULTRAM) 50 mg tablet, Take 2 tablets (100 mg total) by mouth every 4 (four) hours TAKE 2 TABLETS BY MOUTH EVERY 4  HOURS .  RECOMMENDS  NOT EXCEEDING 8 TABLETS PER DAY., Disp: 180 tablet, Rfl: 0    traZODone (DESYREL) 50 mg tablet, Take 2 tablets (100 mg total) by mouth daily at bedtime, Disp: 200 tablet, Rfl: 1    valsartan (DIOVAN) 160 mg tablet, Take 1 tablet (160 mg total) by mouth daily, Disp: 100 tablet, Rfl: 1    LORazepam (ATIVAN) 0.5 mg tablet, TAKE 1 TO 2 TABLETS BY MOUTH AT  BEDTIME AS DIRECTED, Disp: 60 tablet, Rfl: 0    Allergies  Patient has no known allergies.    Family History   Problem Relation Age of Onset    Hypertension Mother     Arthritis Mother     Asthma Mother     Cancer Mother     COPD Mother     Crohn's disease Mother     Cancer Father     COPD Father     Liver disease Father     Mental illness Father     Alcohol abuse Father     Bipolar disorder Sister     Asthma Sister     Cancer Sister     COPD Sister     Depression Sister     Hypertension Sister     Hypothyroidism Sister     Mental  "illness Sister     Anxiety disorder Sister     Bipolar disorder Sister     Heart defect Brother     Hypertension Brother     Stroke Brother     Hyperlipidemia Maternal Grandmother     Hypertension Maternal Grandmother     Hypertension Family     Thyroid cancer Family     Colon polyps Neg Hx     Colon cancer Neg Hx            Objective:  /84 (BP Location: Left arm, Patient Position: Sitting, Cuff Size: Standard)   Pulse 71   Temp 97.9 °F (36.6 °C)   Ht 6' 6\" (1.981 m) Comment: verbal  Wt 135 kg (297 lb)   BMI 34.32 kg/m²     Review of Systems   Constitutional:  Negative for chills and fever.   HENT:  Negative for ear pain and sore throat.    Eyes:  Negative for pain and visual disturbance.   Respiratory:  Negative for cough and shortness of breath.    Cardiovascular:  Negative for chest pain and palpitations.   Gastrointestinal:  Negative for abdominal pain and vomiting.   Genitourinary:  Negative for dysuria and hematuria.   Musculoskeletal:  Negative for arthralgias and back pain.   Skin:  Negative for color change and rash.   Neurological:  Positive for numbness. Negative for seizures and syncope.        Pain and neuropathy of bilateral feet   All other systems reviewed and are negative.      Physical Exam  Constitutional:       Appearance: Normal appearance. He is obese.   HENT:      Head: Normocephalic and atraumatic.      Right Ear: External ear normal.      Left Ear: External ear normal.      Nose: Nose normal.      Mouth/Throat:      Mouth: Mucous membranes are moist.      Pharynx: Oropharynx is clear.   Eyes:      Conjunctiva/sclera: Conjunctivae normal.      Pupils: Pupils are equal, round, and reactive to light.   Cardiovascular:      Pulses: Normal pulses.           Dorsalis pedis pulses are 2+ on the right side and 2+ on the left side.        Posterior tibial pulses are 2+ on the right side and 2+ on the left side.   Pulmonary:      Effort: Pulmonary effort is normal.   Musculoskeletal:      " "Cervical back: Normal range of motion.      Right lower leg: No edema.      Left lower leg: No edema.      Right foot: Decreased range of motion.      Left foot: Decreased range of motion.   Feet:      Right foot:      Protective Sensation: 10 sites tested.  10 sites sensed.      Skin integrity: Skin integrity normal.      Toenail Condition: Right toenails are normal.      Left foot:      Protective Sensation: 10 sites tested.  10 sites sensed.      Skin integrity: Skin integrity normal.      Toenail Condition: Left toenails are normal.      Comments: Pes cavus foot type bilateral, equinovarus deformity bilateral  Neurological:      Mental Status: He is alert. Mental status is at baseline.   Psychiatric:         Mood and Affect: Mood normal.         Behavior: Behavior normal.                No pertinent results found.      Isabella Chaney DPM, ADRIENNE, FACFAS    Portions of the record may have been created with voice recognition software. Occasional wrong word or \"sound a like\" substitutions may have occurred due to the inherent limitations of voice recognition software. Read the chart carefully and recognize, using context, where substitutions have occurred.  "

## 2024-11-19 ENCOUNTER — TELEPHONE (OUTPATIENT)
Dept: PAIN MEDICINE | Facility: CLINIC | Age: 49
End: 2024-11-19

## 2024-11-19 ENCOUNTER — APPOINTMENT (OUTPATIENT)
Dept: RADIOLOGY | Facility: CLINIC | Age: 49
End: 2024-11-19
Payer: COMMERCIAL

## 2024-11-19 ENCOUNTER — CONSULT (OUTPATIENT)
Dept: PAIN MEDICINE | Facility: CLINIC | Age: 49
End: 2024-11-19
Payer: COMMERCIAL

## 2024-11-19 VITALS
TEMPERATURE: 97.6 F | DIASTOLIC BLOOD PRESSURE: 98 MMHG | HEIGHT: 78 IN | SYSTOLIC BLOOD PRESSURE: 140 MMHG | HEART RATE: 72 BPM | WEIGHT: 302 LBS | BODY MASS INDEX: 34.94 KG/M2

## 2024-11-19 DIAGNOSIS — G89.29 CHRONIC PAIN OF RIGHT KNEE: ICD-10-CM

## 2024-11-19 DIAGNOSIS — M79.18 MYOFASCIAL PAIN SYNDROME: Primary | ICD-10-CM

## 2024-11-19 DIAGNOSIS — M54.16 LUMBAR RADICULOPATHY: ICD-10-CM

## 2024-11-19 DIAGNOSIS — M25.561 CHRONIC PAIN OF RIGHT KNEE: ICD-10-CM

## 2024-11-19 DIAGNOSIS — G89.4 CHRONIC PAIN SYNDROME: ICD-10-CM

## 2024-11-19 PROCEDURE — 73562 X-RAY EXAM OF KNEE 3: CPT

## 2024-11-19 PROCEDURE — 99204 OFFICE O/P NEW MOD 45 MIN: CPT | Performed by: ANESTHESIOLOGY

## 2024-11-19 NOTE — PROGRESS NOTES
Assessment  1. Myofascial pain syndrome    2. Lumbar radiculopathy    3. Chronic pain of right knee    4. Chronic pain syndrome        Plan    Pleasant 49-year-old gentleman with history of chronic pain secondary to childhood scoliosis and history of Shermans lumbar kyphosis.  He has superimposed myofascial pain syndrome with chronic lumbago with 2 spinal surgeries and neuropathy.    My recommendations are as follows:    To help treat his myofascial pain we will refer him for chiropractic treatment, specifically active release technique and nerve glides and myofascial release.  Hold off on any high velocity manipulation.  Joshua is in agreement with the plan and referral was placed.    With lower extremity weakness will obtain lumbar MRI as he has had previous 2 spinal surgeries were obtained with and without contrast.  He underwent chiropractic treatment with Dr. Thompson.  He already has blood work scheduled in the near future.    We will follow-up in approximately 3 to 4 weeks time for review.  If his overall symptoms persist would consider referral to Dr. Larios for evaluation and consideration of myofascial treatment.    He will continue with the tramadol and gabapentin as per his primary care physician.    My impressions and treatment recommendations were discussed in detail with the patient who verbalized understanding and had no further questions.  Discharge instructions were provided. I personally saw and examined the patient and I agree with the above discussed plan of care.  This note is created using dictation transcription.  It may contain typographical errors, grammatical errors, improperly dictated words, background noise and other errors.    Orders Placed This Encounter   Procedures    MRI lumbar spine with and without contrast     Standing Status:   Future     Expected Date:   11/19/2024     Expiration Date:   11/19/2028     Scheduling Instructions:      There is no preparation for this test.  Please leave your jewelry and valuables at home, wedding rings are the exception. All patients will be required to change into a hospital gown and pants.  Street clothes are not permitted in the MRI.  Magnetic nail polish must be removed prior to arrival for your test. Please bring your insurance cards, a form of photo ID and a list of your medications with you. Arrive 15 minutes prior to your appointment time in order to register. Please bring any prior CT or MRI studies of this area that were not performed at a [facility].            To schedule this appointment, please contact Central Scheduling at [phone].            Prior to your appointment, please make sure you complete the MRI Screening Form when you e-Check in for your appointment. This will be available starting 7 days before your appointment in [facility]. You may receive an e-mail with an activation code if you do not have a [facility] account. If you do not have access to a device, we will complete your screening at your appointment.     Reason for Exam:   lumbar radiculopathy     What is the patient's sedation requirement?:   No Sedation     Does the patient need medication for Claustrophobia? If yes, order medication at this point.:   No     Does the patient wear a life vest, have an implanted cardiac device, a stimulation device, a sleep apnea stimulator, or a breast tissue expansion device?:   Unknown     Please evaluate if any patient has any of the following risk factors that require renal function labs within 90 days prior to the MRI appointment.:   None of the Above     Release to patient through [employer]:   Immediate    XR knee 3 vw right non injury     Standing Status:   Future     Expected Date:   11/19/2024     Expiration Date:   11/19/2028     Scheduling Instructions:      Bring along any outside films relating to this procedure.          Ambulatory referral to Chiropractic     Standing Status:   Future     Expiration Date:    11/19/2025     Referral Priority:   Routine     Referral Type:   Chiropractic     Referral Reason:   Specialty Services Required     Referred to Provider:   Steven oTro     Requested Specialty:   Chiropractic Medicine     Number of Visits Requested:   1     Expiration Date:   11/19/2025     No orders of the defined types were placed in this encounter.    Referred By: Isabella Chaney DPM  History of Present Illness    Joshua Caballero is a 49 y.o. male with longstanding history of overall muscle body pain neuropathy and lumbar radiculopathy.  Has had 2 spinal surgeries history of Shermans lumbar kyphosis and was in a full body cast as a teenager.    Currently his symptoms is moderate to severe rates between 5-7 out of 10 the visual analog scale significant or fear with a day living 30s.  It is constant described as burning into his legs with a cramping shooting sensation is subjective weakness of his upper and lower limbs.  He reports that most activities increase his symptoms.  He denies any loss of bowel or bladder function.  He currently takes tramadol and gabapentin.  In the past physical therapy and exercise provided no relief.    I have personally reviewed and/or updated the patient's past medical history, past surgical history, family history, social history, current medications, allergies, and vital signs today.     Review of Systems   Constitutional:  Positive for unexpected weight change. Negative for fever.   HENT:  Positive for nosebleeds. Negative for trouble swallowing.    Eyes:  Positive for pain. Negative for visual disturbance.   Respiratory:  Positive for cough. Negative for shortness of breath and wheezing.    Cardiovascular:  Positive for chest pain and leg swelling. Negative for palpitations.   Gastrointestinal:  Negative for constipation, diarrhea, nausea and vomiting.   Endocrine: Negative for cold intolerance, heat intolerance and polydipsia.   Genitourinary:  Negative for difficulty  urinating and frequency.   Musculoskeletal:  Positive for arthralgias, joint swelling and myalgias. Negative for gait problem.   Skin:  Negative for rash.   Neurological:  Positive for numbness and headaches. Negative for dizziness, seizures, syncope and weakness.   Hematological:  Does not bruise/bleed easily.   Psychiatric/Behavioral:  Positive for dysphoric mood.    All other systems reviewed and are negative.      Patient Active Problem List   Diagnosis    Bulging lumbar disc    Chronic tension-type headache    Generalized anxiety disorder    Hyperlipidemia    Hypertension    Insomnia    Severe recurrent major depression without psychotic features ([facility])    Myofascial pain syndrome    Numbness       Past Medical History:   Diagnosis Date    Actinic keratosis     last assessed 10/14/13    Anxiety     Arthritis     Chest pain     Clotting disorder ([facility]) 2015 5/30/23 patient denies    Depression     Fibromyalgia, primary     Headache(784.0)     Herniated nucleus pulposus, L5-S1, right     last assessed 1/21/13    Hyperlipidemia 1999    Hypertension 1997    Obesity     Peptic ulceration     Scoliosis        Past Surgical History:   Procedure Laterality Date    BACK SURGERY      COLONOSCOPY      SPINE SURGERY  11/14/17    UPPER GASTROINTESTINAL ENDOSCOPY         Family History   Problem Relation Age of Onset    Hypertension Mother     Arthritis Mother     Asthma Mother     Cancer Mother     COPD Mother     Crohn's disease Mother     Cancer Father     COPD Father     Liver disease Father     Mental illness Father     Alcohol abuse Father     Bipolar disorder Sister     Asthma Sister     Cancer Sister     COPD Sister     Depression Sister     Hypertension Sister     Hypothyroidism Sister     Mental illness Sister     Anxiety disorder Sister     Bipolar disorder Sister     Heart defect Brother     Hypertension Brother     Stroke Brother     Hyperlipidemia Maternal Grandmother     Hypertension Maternal Grandmother      "Hypertension Family     Thyroid cancer Family     Colon polyps Neg Hx     Colon cancer Neg Hx        Social History     Occupational History    Occupation: full time   Tobacco Use    Smoking status: Never    Smokeless tobacco: Never   Vaping Use    Vaping status: Never Used   Substance and Sexual Activity    Alcohol use: Not Currently     Comment: social    Drug use: No    Sexual activity: Yes     Partners: Male       Current Outpatient Medications on File Prior to Visit   Medication Sig    buPROPion (WELLBUTRIN) 75 mg tablet Take 1 tablet (75 mg total) by mouth 2 (two) times a day    escitalopram (LEXAPRO) 10 mg tablet Take 1 tablet (10 mg total) by mouth daily    fenofibrate (TRICOR) 145 mg tablet Take 1 tablet (145 mg total) by mouth daily    gabapentin (NEURONTIN) 300 mg capsule Take 1 capsule (300 mg total) by mouth 2 (two) times a day    LORazepam (ATIVAN) 0.5 mg tablet TAKE 1 TO 2 TABLETS BY MOUTH AT  BEDTIME AS DIRECTED    methocarbamol (ROBAXIN) 500 mg tablet Take 1 tablet (500 mg total) by mouth 4 (four) times a day    rosuvastatin (CRESTOR) 5 mg tablet Take 1 tablet (5 mg total) by mouth daily    traMADol (ULTRAM) 50 mg tablet TAKE 1 TABLET BY MOUTH EVERY 6  HOURS AS NEEDED FOR MODERATE  PAIN    traMADol (ULTRAM) 50 mg tablet Take 2 tablets (100 mg total) by mouth every 4 (four) hours TAKE 2 TABLETS BY MOUTH EVERY 4  HOURS .  RECOMMENDS  NOT EXCEEDING 8 TABLETS PER DAY.    traZODone (DESYREL) 50 mg tablet Take 2 tablets (100 mg total) by mouth daily at bedtime    valsartan (DIOVAN) 160 mg tablet Take 1 tablet (160 mg total) by mouth daily    [DISCONTINUED] LORazepam (ATIVAN) 0.5 mg tablet TAKE 1 TO 2 TABLETS BY MOUTH AT  BEDTIME AS DIRECTED     No current facility-administered medications on file prior to visit.       No Known Allergies    Physical Exam    /98 (BP Location: Left arm, Patient Position: Sitting, Cuff Size: Standard)   Pulse 72   Temp 97.6 °F (36.4 °C)   Ht 6' 6\" (1.981 " m)   Wt (!) 137 kg (302 lb)   BMI 34.90 kg/m²     Constitutional: normal, well developed, well nourished, alert, in no distress and non-toxic and no overt pain behavior. and overweight  Eyes: anicteric  HEENT: grossly intact  Neck: supple, symmetric, trachea midline and no masses   Pulmonary:even and unlabored  Cardiovascular:No edema or pitting edema present  Skin:Normal without rashes or lesions and well hydrated  Psychiatric:Mood and affect appropriate  Neurologic:Cranial Nerves II-XII grossly intact  Musculoskeletal: Difficulty going from sitting to standing sitting position he has extreme tenderness around the thoracic cervical and lumbar paravertebrals deep tendon reflexes are muted bilateral lower limbs with no focal motor deficits appreciated positive straight leg raising on the right negative on the left.    Imaging  XR PELVIS AP ONLY 1 OR 2 VW @  3-11-24     INDICATION: M54.50: Low back pain, unspecified.     COMPARISON: None     FINDINGS:     No acute fracture or dislocation.  Mild arthritic changes of both hip     No lytic or blastic osseous lesion.     Unremarkable soft tissues.  Degenerative changes lumbar spine with disc space narrowing at L3-4, L2-3     IMPRESSION:     No acute osseous abnormality.    XR SPINE LUMBAR MINIMUM 4 VIEWS NON INJURY @ [facility] 3-11-24     INDICATION: M54.50: Low back pain, unspecified.     COMPARISON: None     FINDINGS:     No acute fracture. Intact pedicles.     Five non-rib-bearing lumbar vertebral bodies.     There is reduction of the lumbar lordosis     Disc space narrowing seen at L5-S1, L4-5 L3-4 along with facet degenerative changes. Spondylotic changes at L5-S1, L4-5, L3-4. No evidence of spondylolysis.     Unremarkable soft tissues.     IMPRESSION:  No acute compression collapse of the vertebra     Degenerative disc disease at L5-S1, L4-5, L3-4 and L2-3, progressed from the previous study     Facet degenerative changes at L4-L5 and L5-S1      I have personally  reviewed pertinent films in PACS and my interpretation is multilevel lumbar disc degeneration with facet arthropathy.

## 2024-11-19 NOTE — TELEPHONE ENCOUNTER
[facility]  X-ray of the knee reveals mild medial compartment arthritis, if interested would refer to orthopedics for evaluation

## 2024-11-20 DIAGNOSIS — G89.29 CHRONIC PAIN OF RIGHT KNEE: Primary | ICD-10-CM

## 2024-11-20 DIAGNOSIS — M25.561 CHRONIC PAIN OF RIGHT KNEE: Primary | ICD-10-CM

## 2024-11-20 NOTE — TELEPHONE ENCOUNTER
S/w pt, advised of above. Pt verbalized understanding and agreement. Advised pt, anticipate the order will be placed today. Pt verbalized understanding and appreciation.

## 2024-12-03 ENCOUNTER — OFFICE VISIT (OUTPATIENT)
Dept: OBGYN CLINIC | Facility: CLINIC | Age: 49
End: 2024-12-03
Payer: COMMERCIAL

## 2024-12-03 VITALS
HEART RATE: 76 BPM | SYSTOLIC BLOOD PRESSURE: 142 MMHG | WEIGHT: 302 LBS | HEIGHT: 78 IN | BODY MASS INDEX: 34.94 KG/M2 | DIASTOLIC BLOOD PRESSURE: 80 MMHG

## 2024-12-03 DIAGNOSIS — M22.2X2 BILATERAL PATELLOFEMORAL SYNDROME: ICD-10-CM

## 2024-12-03 DIAGNOSIS — M17.11 PRIMARY OSTEOARTHRITIS OF RIGHT KNEE: ICD-10-CM

## 2024-12-03 DIAGNOSIS — G89.29 CHRONIC PAIN OF LEFT KNEE: ICD-10-CM

## 2024-12-03 DIAGNOSIS — M25.562 CHRONIC PAIN OF LEFT KNEE: ICD-10-CM

## 2024-12-03 DIAGNOSIS — G89.29 CHRONIC PAIN OF RIGHT KNEE: Primary | ICD-10-CM

## 2024-12-03 DIAGNOSIS — M22.2X1 BILATERAL PATELLOFEMORAL SYNDROME: ICD-10-CM

## 2024-12-03 DIAGNOSIS — M25.561 CHRONIC PAIN OF RIGHT KNEE: Primary | ICD-10-CM

## 2024-12-03 PROCEDURE — 99203 OFFICE O/P NEW LOW 30 MIN: CPT | Performed by: PHYSICIAN ASSISTANT

## 2024-12-03 NOTE — PATIENT INSTRUCTIONS
"Patient Education     Patellofemoral pain   The Basics   Written by the doctors and editors at [facility]   What is patellofemoral pain? -- Patellofemoral pain is a condition that causes pain in the front of the knee. It involves the knee cap, which doctors call the \"patella\" (figure 1).  Many times, patellofemoral pain happens in runners or other people who put a lot of pressure on their knees. But it can also happen when a person's knee cap gets out of line with the knee joint.  What are the symptoms of patellofemoral pain? -- Patellofemoral pain causes pain in the front of the knee, or around or behind the knee cap. The pain can start slowly or quickly. The pain is usually worse when you squat, run, or sit for a long time. You might also feel as if your knee is giving out.  Is there a test for patellofemoral pain? -- No test can tell for sure if you have patellofemoral pain. But your doctor or nurse should be able to tell if you have the condition by learning about your symptoms and doing an exam.  To make sure that your symptoms aren't caused by another condition, your doctor might order an X-ray or imaging test of your knee. Imaging tests create pictures of the inside of the body.  How is patellofemoral pain treated? -- Long term, the most important treatment is strengthening the muscles around your knees and hips. A physical therapist (exercise expert) can teach you exercises to do. This usually also involves strengthening the \"core\" muscles in your belly and lower back.  When you are having pain, these things might help:   Rest your knee and avoiding activities or movements that make the pain worse.   Take nonsteroidal antiinflammatory drugs, also called \"NSAIDs\" - NSAIDs are a large group of medicines that includes ibuprofen (sample brand names: Advil, Motrin) and naproxen (sample brand names: Aleve, Naprosyn). While they can help relieve short-term pain, they should not be used to treat patellofemoral pain " for longer than 2 or 3 weeks.   Put ice on your knee when it hurts or after activities that cause pain - You can put a cold gel pack, bag of ice, or bag of frozen vegetables on the painful area every 1 to 2 hours, for 15 minutes each time. Put a thin towel between the ice (or other cold object) and your skin.  Your doctor might also recommend the following, along with physical therapy:   Wear a knee brace to support your knee.   Tape up your knee in a certain way to support your knee.   Wear special shoe inserts made to fit your foot (to keep your foot from turning in or out too much).  Your symptoms will most likely improve with treatment, especially physical therapy. If they don't, your doctor might have you see a knee specialist to discuss treating your condition with surgery. But this is rare.  How can I prevent getting patellofemoral pain again? -- You can reduce your chances of getting this condition again by doing the exercises and stretches that your doctor or physical therapist shows you. Strengthening your muscles helps reduce the amount of stress on your knees.  All topics are updated as new evidence becomes available and our peer review process is complete.  This topic retrieved from [employer] on: Feb 26, 2024.  Topic 09223 Version 12.0  Release: 32.2.4 - C32.56  © 2024 UpToDate, Inc. and/or its affiliates. All rights reserved.  figure 1: Right knee     Graphic 66411 Version 2.0  Consumer Information Use and Disclaimer   Disclaimer: This generalized information is a limited summary of diagnosis, treatment, and/or medication information. It is not meant to be comprehensive and should be used as a tool to help the user understand and/or assess potential diagnostic and treatment options. It does NOT include all information about conditions, treatments, medications, side effects, or risks that may apply to a specific patient. It is not intended to be medical advice or a substitute for the medical advice,  diagnosis, or treatment of a health care provider based on the health care provider's examination and assessment of a patient's specific and unique circumstances. Patients must speak with a health care provider for complete information about their health, medical questions, and treatment options, including any risks or benefits regarding use of medications. This information does not endorse any treatments or medications as safe, effective, or approved for treating a specific patient. UpToDate, Inc. and its affiliates disclaim any warranty or liability relating to this information or the use thereof.The use of this information is governed by the Terms of Use, available at https://www.Chooslyuwer.com/en/know/clinical-effectiveness-terms. 2024© UpToDate, Inc. and its affiliates and/or licensors. All rights reserved.  Copyright   © 2024 UpToDate, Inc. and/or its affiliates. All rights reserved.

## 2024-12-03 NOTE — PROGRESS NOTES
Orthopaedic Surgery - Office Note  Joshua Caballero (49 y.o. male)   : 1975   MRN: [id number]  Encounter Date: 12/3/2024    Chief Complaint   Patient presents with    Right Knee - Pain         Assessment/Plan  Diagnoses and all orders for this visit:    Chronic pain of right knee  -     Ambulatory referral to Orthopedic Surgery  -     Ambulatory Referral to Physical Therapy; Future  -     Ambulatory Referral to Orthopedic Surgery; Future    Primary osteoarthritis of right knee  -     Ambulatory Referral to Physical Therapy; Future  -     Ambulatory Referral to Orthopedic Surgery; Future    Chronic pain of left knee  -     Ambulatory Referral to Physical Therapy; Future  -     Ambulatory Referral to Orthopedic Surgery; Future    Bilateral patellofemoral syndrome  -     Ambulatory Referral to Physical Therapy; Future  -     Ambulatory Referral to Orthopedic Surgery; Future    The diagnosis as well as treatment options were reviewed with the patient in the office today.  His x-ray images and reports were reviewed.  I believe most of his symptoms in the anterior knee are coming from patellofemoral syndrome which will benefit from formal physical therapy and a dedicated home exercise program.  This may have developed secondary to chronic nerve pathology to his extensor mechanism over time.    His right medial knee symptoms are likely related to early arthritis and I discussed the risks and benefits of a cortisone injection in the office today.  Patient does not wish to try a cortisone injection today after shared decision-making was utilized.  It could be considered as a future treatment measure as needed.    Patient's symptoms do not appear to be mechanical or meniscal in nature and I would not recommend any MRI for surgical planning at this time.  Patient's history symptoms and x-ray findings would not likely benefit from total knee consideration at this time.  I would not recommend bracing for the  "patellofemoral symptoms at this time.  He may ice the knees for comfort.    If patient does not respond to initial conservative treatment measures of PT and home exercise for the next 6 weeks he may return with orthopedic surgeon to discuss next best step treatment options.     Return for Recheck with knee surgeon in 6 weeks if not improved with PT..        History of Present Illness  This is a new patient here for right greater than left anterior knee pain.  Patient reports a long history of knee discomfort and problems dating back to when he was 12 years of age.  He states he has chronic back pain, 2 prior lumbar surgeries, severe neuropathy, and bilateral leg pain and weakness.  He reports he is here for right knee evaluation after getting x-rays.  He reports most of his chronic knee pain is in the front of the knee.  He denies any swelling or mechanical locking and clicking symptoms.  He reports a grinding sensation over the front of the knees.  He reports he has symptoms similarly on the left side located in the front of the knee.  He denies having any recent treatment for the knees.    He does report reluctance he to believe that physical therapy will help his knees as he states he has had physical therapy \"for his whole body over the past several years without relief\".    Review of Systems  Pertinent items are noted in HPI.  All other systems were reviewed and are negative.    Physical Exam  /80 (BP Location: Right arm, Patient Position: Sitting, Cuff Size: Large)   Pulse 76   Ht 6' 6\" (1.981 m)   Wt (!) 137 kg (302 lb)   BMI 34.90 kg/m²   Cons: Appears well.  No apparent distress.  Psych: Alert. Oriented x3.  Mood and affect normal.    On examination patient's right knee is without intra-articular effusion.  He is nontender to lateral joint line.  He has mild medial joint line tenderness.  He has a negative medial and lateral Shantel's.  He has positive patella apprehension with moderate " retropatellar crepitus.  Quad strength is at 4 out of 5.  Hamstring strength is at 5 out of 5.  He has a negative straight leg raise.  He has no pain or instability with valgus and varus stressing.  There is no instability with Lachman and posterior drawer.  He is nontender at the patella tendon and quad tendon.  He is gait is heel-to-toe.    Patient's left knee is without intra-articular effusion.  He is tender on the medial lateral border of the patella with positive patella apprehension and moderate retropatellar crepitus.  He is nontender on the medial and lateral joint lines.  Quad strength is 4 out of 5.  Hamstring strength is 5 out of 5.              Studies Reviewed  RIGHT KNEE     INDICATION:   Pain in right knee. Other chronic pain.        COMPARISON:  None.     VIEWS:  XR KNEE 3 VW RIGHT NON INJURY      FINDINGS:     There is no acute fracture or dislocation.     There is no joint effusion.     Mild narrowing of the medial compartment. Small osteophytes of the medial lateral tibial plateau. Minimal varus angulation. Small superior patellar enthesophyte.     No lytic or blastic osseous lesion.     Soft tissues are unremarkable.     IMPRESSION:        Mild medial compartment degenerative changes as above.  Minimal varus angulation.  Small patellar enthesopathy. No fracture        Electronically signed: 11/19/2024 02:47 PM Otis Roberts MD  X-ray images as well as reports were reviewed by myself in the office today and I agree with radiologist interpretation.  Spine and pain notes were reviewed for today's visit.      Procedures  No procedures today.    Medical, Surgical, Family, and Social History  The patient's medical history, family history, and social history, were reviewed and updated as appropriate.    Past Medical History:   Diagnosis Date    Actinic keratosis     last assessed 10/14/13    Anxiety     Arthritis     Chest pain     Clotting disorder (HCC) 2015 5/30/23 patient denies    Depression      Fibromyalgia, primary     Headache(784.0)     Herniated nucleus pulposus, L5-S1, right     last assessed 1/21/13    Hyperlipidemia 1999    Hypertension 1997    Obesity     Peptic ulceration     Scoliosis        Past Surgical History:   Procedure Laterality Date    BACK SURGERY      COLONOSCOPY      SPINE SURGERY  11/14/17    UPPER GASTROINTESTINAL ENDOSCOPY         Family History   Problem Relation Age of Onset    Hypertension Mother     Arthritis Mother     Asthma Mother     Cancer Mother     COPD Mother     Crohn's disease Mother     Cancer Father     COPD Father     Liver disease Father     Mental illness Father     Alcohol abuse Father     Bipolar disorder Sister     Asthma Sister     Cancer Sister     COPD Sister     Depression Sister     Hypertension Sister     Hypothyroidism Sister     Mental illness Sister     Anxiety disorder Sister     Bipolar disorder Sister     Heart defect Brother     Hypertension Brother     Stroke Brother     Hyperlipidemia Maternal Grandmother     Hypertension Maternal Grandmother     Hypertension Family     Thyroid cancer Family     Colon polyps Neg Hx     Colon cancer Neg Hx        Social History     Occupational History    Occupation: full time   Tobacco Use    Smoking status: Never    Smokeless tobacco: Never   Vaping Use    Vaping status: Never Used   Substance and Sexual Activity    Alcohol use: Not Currently     Comment: social    Drug use: No    Sexual activity: Yes     Partners: Male       No Known Allergies      Current Outpatient Medications:     buPROPion (WELLBUTRIN) 75 mg tablet, Take 1 tablet (75 mg total) by mouth 2 (two) times a day, Disp: 200 tablet, Rfl: 1    escitalopram (LEXAPRO) 10 mg tablet, Take 1 tablet (10 mg total) by mouth daily, Disp: 100 tablet, Rfl: 1    fenofibrate (TRICOR) 145 mg tablet, Take 1 tablet (145 mg total) by mouth daily, Disp: 100 tablet, Rfl: 1    gabapentin (NEURONTIN) 300 mg capsule, Take 1 capsule (300 mg total) by mouth 2 (two)  times a day, Disp: 200 capsule, Rfl: 1    LORazepam (ATIVAN) 0.5 mg tablet, TAKE 1 TO 2 TABLETS BY MOUTH AT  BEDTIME AS DIRECTED, Disp: 60 tablet, Rfl: 1    methocarbamol (ROBAXIN) 500 mg tablet, Take 1 tablet (500 mg total) by mouth 4 (four) times a day, Disp: 30 tablet, Rfl: 0    rosuvastatin (CRESTOR) 5 mg tablet, Take 1 tablet (5 mg total) by mouth daily, Disp: 100 tablet, Rfl: 1    traMADol (ULTRAM) 50 mg tablet, TAKE 1 TABLET BY MOUTH EVERY 6  HOURS AS NEEDED FOR MODERATE  PAIN, Disp: 120 tablet, Rfl: 0    traMADol (ULTRAM) 50 mg tablet, Take 2 tablets (100 mg total) by mouth every 4 (four) hours TAKE 2 TABLETS BY MOUTH EVERY 4  HOURS .  RECOMMENDS  NOT EXCEEDING 8 TABLETS PER DAY., Disp: 180 tablet, Rfl: 0    traZODone (DESYREL) 50 mg tablet, Take 2 tablets (100 mg total) by mouth daily at bedtime, Disp: 200 tablet, Rfl: 1    valsartan (DIOVAN) 160 mg tablet, Take 1 tablet (160 mg total) by mouth daily, Disp: 100 tablet, Rfl: 1      Ant Ashton PA-C

## 2024-12-04 DIAGNOSIS — S39.92XA INJURY OF BACK, INITIAL ENCOUNTER: ICD-10-CM

## 2024-12-04 DIAGNOSIS — E78.5 HYPERLIPIDEMIA, UNSPECIFIED HYPERLIPIDEMIA TYPE: ICD-10-CM

## 2024-12-04 DIAGNOSIS — F99 INSOMNIA DUE TO OTHER MENTAL DISORDER: ICD-10-CM

## 2024-12-04 DIAGNOSIS — I10 PRIMARY HYPERTENSION: ICD-10-CM

## 2024-12-04 DIAGNOSIS — F41.1 GENERALIZED ANXIETY DISORDER: ICD-10-CM

## 2024-12-04 DIAGNOSIS — M51.369 BULGING LUMBAR DISC: ICD-10-CM

## 2024-12-04 DIAGNOSIS — F51.05 INSOMNIA DUE TO OTHER MENTAL DISORDER: ICD-10-CM

## 2024-12-04 DIAGNOSIS — R20.0 NUMBNESS: ICD-10-CM

## 2024-12-04 DIAGNOSIS — F33.2 SEVERE RECURRENT MAJOR DEPRESSION WITHOUT PSYCHOTIC FEATURES (HCC): ICD-10-CM

## 2024-12-04 DIAGNOSIS — M79.18 MYOFASCIAL PAIN SYNDROME: ICD-10-CM

## 2024-12-04 LAB
ALBUMIN SERPL-MCNC: 4.6 G/DL (ref 3.8–4.9)
ALP SERPL-CCNC: 70 IU/L (ref 44–121)
ALT SERPL-CCNC: 29 IU/L (ref 0–44)
ANA SER QL: POSITIVE
APPEARANCE UR: CLEAR
AST SERPL-CCNC: 27 IU/L (ref 0–40)
BILIRUB SERPL-MCNC: 0.5 MG/DL (ref 0–1.2)
BILIRUB UR QL STRIP: NEGATIVE
BUN SERPL-MCNC: 13 MG/DL (ref 6–24)
BUN/CREAT SERPL: 13 (ref 9–20)
CALCIUM SERPL-MCNC: 9.8 MG/DL (ref 8.7–10.2)
CENTROMERE B AB SER-ACNC: <0.2 AI (ref 0–0.9)
CHLORIDE SERPL-SCNC: 100 MMOL/L (ref 96–106)
CHOLEST SERPL-MCNC: 145 MG/DL (ref 100–199)
CHOLEST/HDLC SERPL: 3.7 RATIO (ref 0–5)
CHROMATIN AB SERPL-ACNC: <0.2 AI (ref 0–0.9)
CO2 SERPL-SCNC: 23 MMOL/L (ref 20–29)
COLOR UR: YELLOW
CREAT SERPL-MCNC: 0.99 MG/DL (ref 0.76–1.27)
CRP SERPL-MCNC: 9 MG/L (ref 0–10)
DSDNA AB SER-ACNC: <1 IU/ML (ref 0–9)
EGFR: 92 ML/MIN/1.73
ENA JO1 AB SER-ACNC: <0.2 AI (ref 0–0.9)
ENA RNP AB SER-ACNC: 1.6 AI (ref 0–0.9)
ENA SCL70 AB SER-ACNC: <0.2 AI (ref 0–0.9)
ENA SM AB SER-ACNC: <0.2 AI (ref 0–0.9)
ENA SS-A AB SER-ACNC: <0.2 AI (ref 0–0.9)
ENA SS-B AB SER-ACNC: <0.2 AI (ref 0–0.9)
ERYTHROCYTE [DISTWIDTH] IN BLOOD BY AUTOMATED COUNT: 12.8 % (ref 11.6–15.4)
EST. AVERAGE GLUCOSE BLD GHB EST-MCNC: 177 MG/DL
GLOBULIN SER-MCNC: 2.6 G/DL (ref 1.5–4.5)
GLUCOSE SERPL-MCNC: 148 MG/DL (ref 70–99)
GLUCOSE UR QL: NEGATIVE
HBA1C MFR BLD: 7.8 % (ref 4.8–5.6)
HCT VFR BLD AUTO: 44.5 % (ref 37.5–51)
HDLC SERPL-MCNC: 39 MG/DL
HGB BLD-MCNC: 14.1 G/DL (ref 13–17.7)
HGB UR QL STRIP: NEGATIVE
KETONES UR QL STRIP: NEGATIVE
LDL DIRECT COMMENT: NORMAL
LDLC SERPL CALC-MCNC: 63 MG/DL (ref 0–99)
LDLC SERPL DIRECT ASSAY-MCNC: 65 MG/DL (ref 0–99)
LEUKOCYTE ESTERASE UR QL STRIP: NEGATIVE
MCH RBC QN AUTO: 28.7 PG (ref 26.6–33)
MCHC RBC AUTO-ENTMCNC: 31.7 G/DL (ref 31.5–35.7)
MCV RBC AUTO: 91 FL (ref 79–97)
MICRO URNS: NORMAL
NITRITE UR QL STRIP: NEGATIVE
PH UR STRIP: 6 [PH] (ref 5–7.5)
PLATELET # BLD AUTO: 275 X10E3/UL (ref 150–450)
POTASSIUM SERPL-SCNC: 4.2 MMOL/L (ref 3.5–5.2)
PROT SERPL-MCNC: 7.2 G/DL (ref 6–8.5)
PROT UR QL STRIP: NEGATIVE
PSA SERPL-MCNC: 0.6 NG/ML (ref 0–4)
RBC # BLD AUTO: 4.91 X10E6/UL (ref 4.14–5.8)
RHEUMATOID FACT SERPL-ACNC: <10 IU/ML
SL AMB REFLEX CRITERIA: NORMAL
SL AMB SEE BELOW:: ABNORMAL
SL AMB VLDL CHOLESTEROL CALC: 43 MG/DL (ref 5–40)
SODIUM SERPL-SCNC: 140 MMOL/L (ref 134–144)
SP GR UR: 1.02 (ref 1–1.03)
TRIGL SERPL-MCNC: 273 MG/DL (ref 0–149)
TSH SERPL DL<=0.005 MIU/L-ACNC: 2.21 UIU/ML (ref 0.45–4.5)
URATE SERPL-MCNC: 4.7 MG/DL (ref 3.8–8.4)
UROBILINOGEN UR STRIP-ACNC: 0.2 MG/DL (ref 0.2–1)
VIT B12 SERPL-MCNC: 328 PG/ML (ref 232–1245)
WBC # BLD AUTO: 9.1 X10E3/UL (ref 3.4–10.8)

## 2024-12-04 RX ORDER — TRAMADOL HYDROCHLORIDE 50 MG/1
50 TABLET ORAL EVERY 6 HOURS PRN
Qty: 120 TABLET | Refills: 0 | Status: CANCELLED | OUTPATIENT
Start: 2024-12-04

## 2024-12-05 RX ORDER — ESCITALOPRAM OXALATE 10 MG/1
10 TABLET ORAL DAILY
Qty: 100 TABLET | Refills: 1 | Status: SHIPPED | OUTPATIENT
Start: 2024-12-05

## 2024-12-05 RX ORDER — ROSUVASTATIN CALCIUM 5 MG/1
5 TABLET, COATED ORAL DAILY
Qty: 100 TABLET | Refills: 1 | Status: SHIPPED | OUTPATIENT
Start: 2024-12-05

## 2024-12-05 RX ORDER — TRAMADOL HYDROCHLORIDE 50 MG/1
50 TABLET ORAL EVERY 6 HOURS PRN
Qty: 120 TABLET | Refills: 0 | Status: SHIPPED | OUTPATIENT
Start: 2024-12-05

## 2024-12-05 RX ORDER — METHOCARBAMOL 500 MG/1
500 TABLET, FILM COATED ORAL 4 TIMES DAILY
Qty: 30 TABLET | Refills: 1 | Status: SHIPPED | OUTPATIENT
Start: 2024-12-05

## 2024-12-05 RX ORDER — LORAZEPAM 0.5 MG/1
TABLET ORAL
Qty: 60 TABLET | Refills: 0 | Status: SHIPPED | OUTPATIENT
Start: 2024-12-05

## 2024-12-05 RX ORDER — BUPROPION HYDROCHLORIDE 75 MG/1
75 TABLET ORAL 2 TIMES DAILY
Qty: 200 TABLET | Refills: 1 | Status: SHIPPED | OUTPATIENT
Start: 2024-12-05

## 2024-12-05 RX ORDER — VALSARTAN 160 MG/1
160 TABLET ORAL DAILY
Qty: 100 TABLET | Refills: 1 | Status: SHIPPED | OUTPATIENT
Start: 2024-12-05

## 2024-12-05 RX ORDER — GABAPENTIN 300 MG/1
300 CAPSULE ORAL 2 TIMES DAILY
Qty: 200 CAPSULE | Refills: 1 | Status: SHIPPED | OUTPATIENT
Start: 2024-12-05

## 2024-12-05 RX ORDER — FENOFIBRATE 145 MG/1
145 TABLET, COATED ORAL DAILY
Qty: 100 TABLET | Refills: 1 | Status: SHIPPED | OUTPATIENT
Start: 2024-12-05

## 2024-12-05 RX ORDER — TRAZODONE HYDROCHLORIDE 50 MG/1
100 TABLET, FILM COATED ORAL
Qty: 200 TABLET | Refills: 1 | Status: SHIPPED | OUTPATIENT
Start: 2024-12-05

## 2024-12-17 ENCOUNTER — RESULTS FOLLOW-UP (OUTPATIENT)
Dept: FAMILY MEDICINE CLINIC | Facility: CLINIC | Age: 49
End: 2024-12-17

## 2024-12-17 ENCOUNTER — HOSPITAL ENCOUNTER (OUTPATIENT)
Dept: CT IMAGING | Facility: HOSPITAL | Age: 49
Discharge: HOME | End: 2024-12-17
Payer: COMMERCIAL

## 2024-12-17 ENCOUNTER — HOSPITAL ENCOUNTER (OUTPATIENT)
Dept: MRI IMAGING | Facility: HOSPITAL | Age: 49
Discharge: HOME | End: 2024-12-17
Attending: ANESTHESIOLOGY
Payer: COMMERCIAL

## 2024-12-17 DIAGNOSIS — R73.01 IMPAIRED FASTING GLUCOSE: Primary | ICD-10-CM

## 2024-12-17 DIAGNOSIS — G89.29 CHRONIC INTRACTABLE HEADACHE, UNSPECIFIED HEADACHE TYPE: ICD-10-CM

## 2024-12-17 DIAGNOSIS — R73.01 IMPAIRED FASTING GLUCOSE: ICD-10-CM

## 2024-12-17 DIAGNOSIS — M54.16 LUMBAR RADICULOPATHY: ICD-10-CM

## 2024-12-17 DIAGNOSIS — R51.9 CHRONIC INTRACTABLE HEADACHE, UNSPECIFIED HEADACHE TYPE: ICD-10-CM

## 2024-12-17 DIAGNOSIS — G43.711 CHRONIC MIGRAINE WITHOUT AURA, INTRACTABLE, WITH STATUS MIGRAINOSUS: ICD-10-CM

## 2024-12-17 PROCEDURE — 70450 CT HEAD/BRAIN W/O DYE: CPT

## 2024-12-17 PROCEDURE — A9585 GADOBUTROL INJECTION: HCPCS | Performed by: ANESTHESIOLOGY

## 2024-12-17 PROCEDURE — 72158 MRI LUMBAR SPINE W/O & W/DYE: CPT

## 2024-12-17 RX ORDER — GADOBUTROL 604.72 MG/ML
13 INJECTION INTRAVENOUS
Status: COMPLETED | OUTPATIENT
Start: 2024-12-17 | End: 2024-12-17

## 2024-12-17 RX ADMIN — GADOBUTROL 13 ML: 604.72 INJECTION INTRAVENOUS at 20:39

## 2024-12-19 ENCOUNTER — RESULTS FOLLOW-UP (OUTPATIENT)
Dept: FAMILY MEDICINE CLINIC | Facility: CLINIC | Age: 49
End: 2024-12-19

## 2025-01-07 ENCOUNTER — OFFICE VISIT (OUTPATIENT)
Dept: PAIN MEDICINE | Facility: CLINIC | Age: 50
End: 2025-01-07
Payer: COMMERCIAL

## 2025-01-07 VITALS — HEIGHT: 78 IN | HEART RATE: 78 BPM | BODY MASS INDEX: 36.45 KG/M2 | WEIGHT: 315 LBS | TEMPERATURE: 97.7 F

## 2025-01-07 DIAGNOSIS — M96.1 LUMBAR POSTLAMINECTOMY SYNDROME: ICD-10-CM

## 2025-01-07 DIAGNOSIS — M54.2 NECK PAIN: ICD-10-CM

## 2025-01-07 DIAGNOSIS — M47.816 LUMBAR SPONDYLOSIS: Primary | ICD-10-CM

## 2025-01-07 DIAGNOSIS — M47.812 CERVICAL SPONDYLOSIS: ICD-10-CM

## 2025-01-07 PROCEDURE — 99214 OFFICE O/P EST MOD 30 MIN: CPT | Performed by: PHYSICIAN ASSISTANT

## 2025-01-07 NOTE — PROGRESS NOTES
Assessment:  1. Lumbar spondylosis    2. Lumbar postlaminectomy syndrome    3. Cervical spondylosis    4. Neck pain        Plan:  While the patient was in the office today, I did have a thorough conversation regarding their chronic pain syndrome, medication management, and treatment plan options.    The patient's low back pain persists despite time, relative rest, activity modification and therapy. Based on the patient's symptoms examination, I suspect that the pain is being generated by the lumbar facet joints. The facet joints are only one of many possible low-back pain generators. We will schedule the patient for diagnostic bilateral L3-5 lumbar medial branch blockade using the double block paradigm.  If the patient demonstrates appropriate response to medial branch blockade we will schedule for radiofrequency ablation to provide long-term relief. Complete risks and benefits including bleeding, infection, tissue reaction, nerve injury and allergic reaction were discussed. The approach was demonstrated using models and literature was provided. Verbal and written consent was obtained.    The patient has been experiencing moderate to severe axial spine pain that is causing functional deficit.  The pain has been present for at least 3 months and is not improving with conservative care.  Currently the patient is not experiencing any radicular features nor neurogenic claudication.  Non-facet pathology has been ruled out on clinical evaluation.    Provided patient with a referral to consult with rheumatology.    I do feel it is medically reasonable to order an MRI of the cervical spine given his chronic and persistent neck pain.  X-rays revealed multilevel degenerative changes.    The patient was advised to contact the office should their symptoms worsen in the interim. The patient was agreeable and verbalized an understanding.        History of Present Illness:    The patient is a 49 y.o. male last seen on 11/19/2024  who presents for a follow up office visit in regards to chronic low back pain secondary to lumbar spondylosis, lumbar postlaminectomy pain syndrome and history of Scheuermann's kyphosis as well as chronic neck pain secondary to spondylosis. The patient currently reports low back pain that he rates a 7 out of 10 describes as a constant burning, sharp, throbbing, cramping and shooting type of pain.  Patient denies any lower extremity radicular features.  He has occasional referred pain patterns into the hips.  This pain is made worse with prolonged sitting and prolonged standing.  Patient underwent a lumbar spine MRI and presents today to review this and discuss potential treatment options.  He also continues with neck pain bilaterally with referred pain into the shoulders.  Previous x-rays revealed multilevel degenerative changes.  He has not had a cervical spine MRI.  Patient attends chiropractic therapy on a regular basis with partial and temporary relief.    I have personally reviewed and/or updated the patient's past medical history, past surgical history, family history, social history, current medications, allergies, and vital signs today.       Review of Systems:    Review of Systems   Musculoskeletal:  Positive for gait problem.        Joint stiffness.          Past Medical History:   Diagnosis Date   • Actinic keratosis     last assessed 10/14/13   • Anxiety    • Arthritis    • Chest pain    • Clotting disorder ([facility]) 2015 5/30/23 patient denies   • Depression    • Fibromyalgia, primary    • Headache(784.0)    • Herniated nucleus pulposus, L5-S1, right     last assessed 1/21/13   • Hyperlipidemia 1999   • Hypertension 1997   • Obesity    • Peptic ulceration    • Rheumatoid arthritis (HCC) 1987   • Scoliosis        Past Surgical History:   Procedure Laterality Date   • BACK SURGERY     • COLONOSCOPY     • SPINE SURGERY  11/14/17   • UPPER GASTROINTESTINAL ENDOSCOPY         Family History   Problem Relation  Age of Onset   • Hypertension Mother    • Arthritis Mother    • Asthma Mother    • Cancer Mother    • COPD Mother    • Crohn's disease Mother    • Diabetes Mother    • Osteoporosis Mother    • Cancer Father    • COPD Father    • Liver disease Father    • Mental illness Father    • Alcohol abuse Father             • Bipolar disorder Sister    • Asthma Sister    • Cancer Sister    • COPD Sister    • Depression Sister    • Hypertension Sister    • Hypothyroidism Sister    • Mental illness Sister    • Anxiety disorder Sister    • Bipolar disorder Sister    • Scoliosis Sister    • Migraines Sister    • Heart defect Brother    • Hypertension Brother    • Stroke Brother    • Hyperlipidemia Maternal Grandmother    • Hypertension Maternal Grandmother    • Hypertension Family    • Thyroid cancer Family    • Cancer Maternal Aunt         Cancer   • Colon polyps Neg Hx    • Colon cancer Neg Hx        Social History     Occupational History   • Occupation: full time   Tobacco Use   • Smoking status: Never   • Smokeless tobacco: Never   Vaping Use   • Vaping status: Never Used   Substance and Sexual Activity   • Alcohol use: Not Currently     Comment: social   • Drug use: No   • Sexual activity: Yes     Partners: Male         Current Outpatient Medications:   •  buPROPion (WELLBUTRIN) 75 mg tablet, Take 1 tablet (75 mg total) by mouth 2 (two) times a day, Disp: 200 tablet, Rfl: 1  •  escitalopram (LEXAPRO) 10 mg tablet, Take 1 tablet (10 mg total) by mouth daily, Disp: 100 tablet, Rfl: 1  •  fenofibrate (TRICOR) 145 mg tablet, Take 1 tablet (145 mg total) by mouth daily, Disp: 100 tablet, Rfl: 1  •  gabapentin (NEURONTIN) 300 mg capsule, Take 1 capsule (300 mg total) by mouth 2 (two) times a day, Disp: 200 capsule, Rfl: 1  •  LORazepam (ATIVAN) 0.5 mg tablet, TAKE 1 TO 2 TABLETS BY MOUTH AT  BEDTIME AS DIRECTED, Disp: 60 tablet, Rfl: 0  •  metFORMIN (GLUCOPHAGE) 500 mg tablet, Take 1 tablet (500 mg total) by mouth 2  "(two) times a day with meals, Disp: 180 tablet, Rfl: 1  •  methocarbamol (ROBAXIN) 500 mg tablet, Take 1 tablet (500 mg total) by mouth 4 (four) times a day, Disp: 30 tablet, Rfl: 1  •  rosuvastatin (CRESTOR) 5 mg tablet, Take 1 tablet (5 mg total) by mouth daily, Disp: 100 tablet, Rfl: 1  •  traMADol (ULTRAM) 50 mg tablet, Take 2 tablets (100 mg total) by mouth every 4 (four) hours TAKE 2 TABLETS BY MOUTH EVERY 4  HOURS .  RECOMMENDS  NOT EXCEEDING 8 TABLETS PER DAY., Disp: 180 tablet, Rfl: 0  •  traZODone (DESYREL) 50 mg tablet, Take 2 tablets (100 mg total) by mouth daily at bedtime, Disp: 200 tablet, Rfl: 1  •  valsartan (DIOVAN) 160 mg tablet, Take 1 tablet (160 mg total) by mouth daily, Disp: 100 tablet, Rfl: 1    No Known Allergies    Physical Exam:    Pulse 78   Temp 97.7 °F (36.5 °C)   Ht 6' 6\" (1.981 m) Comment: Verbal  Wt (!) 144 kg (318 lb)   BMI 36.75 kg/m²     Constitutional:normal, well developed, well nourished, alert, in no distress and non-toxic and no overt pain behavior. and obese  Eyes:anicteric  HEENT:grossly intact  Neck:supple, symmetric, trachea midline and no masses   Pulmonary:even and unlabored  Cardiovascular:No edema or pitting edema present  Skin:Normal without rashes or lesions and well hydrated  Psychiatric:Mood and affect appropriate  Neurologic:Cranial Nerves II-XII grossly intact  Musculoskeletal: Tenderness palpation over bilateral lumbar facet joints with positive facet loading test, gait is slow but stable.      Imaging    MRI LUMBAR SPINE WITH AND WITHOUT CONTRAST @  12/17/2024.     INDICATION: M54.16: Radiculopathy, lumbar region.     COMPARISON: 6/26/2016     TECHNIQUE:  Multiplanar, multisequence imaging of the lumbar spine was performed before and after gadolinium administration. .        IV Contrast:  13 mL of Gadobutrol injection (SINGLE-DOSE)     IMAGE QUALITY:  Diagnostic     FINDINGS:     VERTEBRAL BODIES:  There are 5 lumbar type vertebral bodies. " Straightening of the usual lumbar lordosis. No compression fracture. Multilevel Schmorl's node changes. No destructive osseous lesions. Right laminotomy at L5-S1. Type II Modic endplate changes   at L5-S1.     SACRUM:  Normal signal within the sacrum. No evidence of insufficiency or stress fracture.     DISTAL CORD AND CONUS:  Normal size and signal within the distal cord and conus.     PARASPINAL SOFT TISSUES:  Paraspinal soft tissues are unremarkable.     LOWER THORACIC DISC SPACES:  Normal disc height and signal.  No disc herniation, canal stenosis or foraminal narrowing.     LUMBAR DISC SPACES:     L1-L2:  Normal.     L2-L3:  Normal.     L3-L4: Shallow disc bulge and mild bilateral facet arthropathy without significant stenosis. Mild bilateral subarticular crowding.     L4-L5: Mild bilateral facet arthropathy and shallow disc bulge without significant stenosis.     L5-S1: Surgical level. Moderate bilateral facet arthropathy. Disc height loss with broad disc osteophyte. No significant canal narrowing. Marginal osteophytes produce mild bilateral foraminal narrowing. Mild bilateral subarticular crowding.     POSTCONTRAST IMAGING:  No abnormal enhancement.     OTHER FINDINGS:  None.     IMPRESSION:     Postsurgical and degenerative changes as described without significant stenosis at any level. No pathologic enhancement.            XR KNEE 3 VW RIGHT NON INJURY  @ [facility] 11/19/2024.    RIGHT KNEE     INDICATION:   Pain in right knee. Other chronic pain.        COMPARISON:  None.     VIEWS:  XR KNEE 3 VW RIGHT NON INJURY      FINDINGS:     There is no acute fracture or dislocation.     There is no joint effusion.     Mild narrowing of the medial compartment. Small osteophytes of the medial lateral tibial plateau. Minimal varus angulation. Small superior patellar enthesophyte.     No lytic or blastic osseous lesion.     Soft tissues are unremarkable.     IMPRESSION:        Mild medial compartment degenerative changes as  above.  Minimal varus angulation.  Small patellar enthesopathy. No fracture

## 2025-01-10 DIAGNOSIS — F41.9 ANXIETY: Primary | ICD-10-CM

## 2025-01-10 RX ORDER — ALPRAZOLAM 0.5 MG
TABLET ORAL
Qty: 2 TABLET | Refills: 0 | Status: SHIPPED | OUTPATIENT
Start: 2025-01-10

## 2025-01-13 DIAGNOSIS — M51.369 BULGING LUMBAR DISC: ICD-10-CM

## 2025-01-13 DIAGNOSIS — M79.18 MYOFASCIAL PAIN SYNDROME: ICD-10-CM

## 2025-01-14 RX ORDER — TRAMADOL HYDROCHLORIDE 50 MG/1
50 TABLET ORAL EVERY 6 HOURS PRN
Qty: 120 TABLET | Refills: 0 | Status: SHIPPED | OUTPATIENT
Start: 2025-01-14 | End: 2025-01-24

## 2025-01-15 ENCOUNTER — HOSPITAL ENCOUNTER (OUTPATIENT)
Dept: MRI IMAGING | Facility: HOSPITAL | Age: 50
Discharge: HOME | End: 2025-01-15
Payer: COMMERCIAL

## 2025-01-15 DIAGNOSIS — M47.812 CERVICAL SPONDYLOSIS: ICD-10-CM

## 2025-01-15 DIAGNOSIS — M54.2 NECK PAIN: ICD-10-CM

## 2025-01-15 PROCEDURE — 72141 MRI NECK SPINE W/O DYE: CPT

## 2025-01-17 ENCOUNTER — RESULTS FOLLOW-UP (OUTPATIENT)
Dept: PAIN MEDICINE | Facility: CLINIC | Age: 50
End: 2025-01-17

## 2025-01-17 NOTE — TELEPHONE ENCOUNTER
----- Message from Isabella Starr PA-C sent at 1/17/2025  8:36 AM EST -----  Please let patient know that his cervical spine MRI shows minimal arthritic changes.

## 2025-01-24 DIAGNOSIS — F41.1 GENERALIZED ANXIETY DISORDER: ICD-10-CM

## 2025-01-24 DIAGNOSIS — F51.05 INSOMNIA DUE TO OTHER MENTAL DISORDER: ICD-10-CM

## 2025-01-24 DIAGNOSIS — F99 INSOMNIA DUE TO OTHER MENTAL DISORDER: ICD-10-CM

## 2025-01-24 DIAGNOSIS — M79.18 MYOFASCIAL PAIN SYNDROME: ICD-10-CM

## 2025-01-24 DIAGNOSIS — M51.369 BULGING LUMBAR DISC: ICD-10-CM

## 2025-01-24 RX ORDER — TRAMADOL HYDROCHLORIDE 50 MG/1
50 TABLET ORAL EVERY 6 HOURS PRN
Qty: 120 TABLET | Refills: 1 | Status: SHIPPED | OUTPATIENT
Start: 2025-01-24

## 2025-01-24 RX ORDER — LORAZEPAM 0.5 MG/1
TABLET ORAL
Qty: 60 TABLET | Refills: 1 | Status: SHIPPED | OUTPATIENT
Start: 2025-01-24

## 2025-01-27 ENCOUNTER — HOSPITAL ENCOUNTER (OUTPATIENT)
Dept: RADIOLOGY | Facility: CLINIC | Age: 50
Discharge: HOME | End: 2025-01-27
Payer: COMMERCIAL

## 2025-01-27 VITALS
DIASTOLIC BLOOD PRESSURE: 86 MMHG | OXYGEN SATURATION: 95 % | SYSTOLIC BLOOD PRESSURE: 133 MMHG | HEART RATE: 75 BPM | TEMPERATURE: 97.7 F | RESPIRATION RATE: 16 BRPM

## 2025-01-27 DIAGNOSIS — M47.816 LUMBAR SPONDYLOSIS: ICD-10-CM

## 2025-01-27 DIAGNOSIS — M96.1 LUMBAR POSTLAMINECTOMY SYNDROME: ICD-10-CM

## 2025-01-27 PROCEDURE — 64493 INJ PARAVERT F JNT L/S 1 LEV: CPT | Performed by: ANESTHESIOLOGY

## 2025-01-27 PROCEDURE — 64494 INJ PARAVERT F JNT L/S 2 LEV: CPT | Performed by: ANESTHESIOLOGY

## 2025-01-27 RX ORDER — BUPIVACAINE HCL/PF 2.5 MG/ML
6 VIAL (ML) INJECTION ONCE
Status: COMPLETED | OUTPATIENT
Start: 2025-01-27 | End: 2025-01-27

## 2025-01-27 RX ADMIN — BUPIVACAINE HYDROCHLORIDE 6 ML: 2.5 INJECTION, SOLUTION EPIDURAL; INFILTRATION; INTRACAUDAL at 09:28

## 2025-01-27 NOTE — H&P
History of Present Illness: The patient is a 49 y.o. male who presents with complaints of low back pain.    Past Medical History:   Diagnosis Date    Actinic keratosis     last assessed 10/14/13    Anxiety     Arthritis     Chest pain     Clotting disorder (HCC) 2015 5/30/23 patient denies    Depression     Fibromyalgia, primary     Headache(784.0)     Herniated nucleus pulposus, L5-S1, right     last assessed 1/21/13    Hyperlipidemia 1999    Hypertension 1997    Obesity     Peptic ulceration     Rheumatoid arthritis (HCC) 1987    Scoliosis        Past Surgical History:   Procedure Laterality Date    BACK SURGERY      COLONOSCOPY      SPINE SURGERY  11/14/17    UPPER GASTROINTESTINAL ENDOSCOPY           Current Outpatient Medications:     ALPRAZolam (XANAX) 0.5 mg tablet, One tab 1 hour before procedure can repeat at time of procedure, Disp: 2 tablet, Rfl: 0    buPROPion (WELLBUTRIN) 75 mg tablet, Take 1 tablet (75 mg total) by mouth 2 (two) times a day, Disp: 200 tablet, Rfl: 1    escitalopram (LEXAPRO) 10 mg tablet, Take 1 tablet (10 mg total) by mouth daily, Disp: 100 tablet, Rfl: 1    fenofibrate (TRICOR) 145 mg tablet, Take 1 tablet (145 mg total) by mouth daily, Disp: 100 tablet, Rfl: 1    gabapentin (NEURONTIN) 300 mg capsule, Take 1 capsule (300 mg total) by mouth 2 (two) times a day, Disp: 200 capsule, Rfl: 1    LORazepam (ATIVAN) 0.5 mg tablet, TAKE 1 TO 2 TABLETS BY MOUTH AT  BEDTIME AS DIRECTED, Disp: 60 tablet, Rfl: 1    metFORMIN (GLUCOPHAGE) 500 mg tablet, Take 1 tablet (500 mg total) by mouth 2 (two) times a day with meals, Disp: 180 tablet, Rfl: 1    methocarbamol (ROBAXIN) 500 mg tablet, Take 1 tablet (500 mg total) by mouth 4 (four) times a day, Disp: 30 tablet, Rfl: 1    rosuvastatin (CRESTOR) 5 mg tablet, Take 1 tablet (5 mg total) by mouth daily, Disp: 100 tablet, Rfl: 1    traMADol (ULTRAM) 50 mg tablet, TAKE 1 TABLET BY MOUTH EVERY 6  HOURS AS NEEDED FOR MODERATE  PAIN, Disp: 120 tablet,  Rfl: 1    traZODone (DESYREL) 50 mg tablet, Take 2 tablets (100 mg total) by mouth daily at bedtime, Disp: 200 tablet, Rfl: 1    valsartan (DIOVAN) 160 mg tablet, Take 1 tablet (160 mg total) by mouth daily, Disp: 100 tablet, Rfl: 1    Current Facility-Administered Medications:     bupivacaine (PF) (MARCAINE) 0.25 % injection 6 mL, 6 mL, Perineural, Once, Severino Lewis, DO    No Known Allergies    Physical Exam:   Vitals:    01/27/25 0914   BP: 145/86   Pulse: 75   Resp: 16   Temp: 97.7 °F (36.5 °C)   SpO2: 99%     General: Awake, Alert, Oriented x 3, Mood and affect appropriate  Respiratory: Respirations even and unlabored  Cardiovascular: Peripheral pulses intact; no edema  Musculoskeletal Exam: Pain with lumbar extension    ASA Score: 3    Patient/Chart Verification  Patient ID Verified: Verbal  ID Band Applied: No  Consents Confirmed: To be obtained in the Procedural area  H&P( within 30 days) Verified: To be obtained in the Procedural area  Interval H&P(within 24 hr) Complete (required for Outpatients and Surgery Admit only): To be obtained in the Procedural area  Allergies Reviewed: Yes  Anticoag/NSAID held?: NA  Currently on antibiotics?: No    Assessment:   1. Lumbar spondylosis    2. Lumbar postlaminectomy syndrome        Plan: B/L L3-5 MBB #1

## 2025-01-27 NOTE — DISCHARGE INSTRUCTIONS
ACTIVITY  Please do activities that will bring on the normal pain that we are rating. For example, if vacuuming or walking increases the pain, do that. This will give the most accurate response to the diary.  You may shower, but no tub baths today, or applied heat.    CARE OF THE INJECTION SITE  This area may be numb for several hours after the injection.  Notify the [facility] if you have any of the following:  redness, drainage, swelling, or fever above 100°F.    SPECIAL INSTRUCTIONS  Please return the MBB diary to our office by mail, fax, or drop it off.    MEDICATIONS  Please do not take any break through or short acting pain medications for 8 hours after the block.  Continue to take all routine medications.  Our office may have instructed you to hold some medications.    As no general anesthesia was used in today's procedure, you should not experience any side effects related to anesthesia.     If you have a problem specifically related to your procedure, please call our office at [phone].    Problems not related to your procedure should be directed to your primary care physician.   
concern for PE, CHF, COPD, ACS, PNA - will check CTA chest, also L duplex to r/o dvt; will try duonebs and reassess

## 2025-01-28 ENCOUNTER — TELEPHONE (OUTPATIENT)
Dept: PAIN MEDICINE | Facility: CLINIC | Age: 50
End: 2025-01-28

## 2025-01-28 NOTE — TELEPHONE ENCOUNTER
Caller: Patient    Doctor: RICH    Reason for call: Patient is schedule.ed for tomorrow     Call back#:

## 2025-01-28 NOTE — TELEPHONE ENCOUNTER
----- Message from Severino Lewis DO sent at 1/28/2025  8:27 AM EST -----  Reviewed patient's pain diary, please confirm he had minimal relief, if minimal relief please schedule follow-up with NP/PA to review other options

## 2025-01-29 ENCOUNTER — OFFICE VISIT (OUTPATIENT)
Dept: PAIN MEDICINE | Facility: CLINIC | Age: 50
End: 2025-01-29
Payer: COMMERCIAL

## 2025-01-29 VITALS — BODY MASS INDEX: 36.45 KG/M2 | TEMPERATURE: 98.8 F | HEIGHT: 78 IN | HEART RATE: 88 BPM | WEIGHT: 315 LBS

## 2025-01-29 DIAGNOSIS — M46.1 SACROILIITIS (HCC): Primary | ICD-10-CM

## 2025-01-29 DIAGNOSIS — M47.816 LUMBAR SPONDYLOSIS: ICD-10-CM

## 2025-01-29 DIAGNOSIS — M96.1 LUMBAR POSTLAMINECTOMY SYNDROME: ICD-10-CM

## 2025-01-29 DIAGNOSIS — E66.9 OBESITY (BMI 35.0-39.9 WITHOUT COMORBIDITY): ICD-10-CM

## 2025-01-29 PROCEDURE — 99214 OFFICE O/P EST MOD 30 MIN: CPT | Performed by: PHYSICIAN ASSISTANT

## 2025-01-29 NOTE — PROGRESS NOTES
Assessment:  1. Sacroiliitis (HCC)    2. Lumbar spondylosis    3. Lumbar postlaminectomy syndrome    4. Obesity (BMI 35.0-39.9 without comorbidity)        Plan:  While the patient was in the office today, I did have a thorough conversation regarding their chronic pain syndrome, medication management, and treatment plan options.    Based on patient report and physical exam, the patient's symptomatology does seem to be consistent with sacroiliac mediated pain from sacroiliitis. We will schedule the patient for a bilateral SIJ injection to decrease any inflammatory component of the patient's pain symptoms.    The patient does still exhibit symptoms consistent with facet mediated pain however given his inconclusive pain diary status post medial branch block, I am not fully convinced that radiofrequency ablation would be of significant benefit.  Patient verbalized understanding.    I have provided the patient with a referral to weight management.  I do believe weight loss can significantly benefit back pain and knee pain.    The patient was advised to contact the office should their symptoms worsen in the interim. The patient was agreeable and verbalized an understanding.        History of Present Illness:    The patient is a 49 y.o. male last seen on 01/27/2025 who presents for a follow up office visit in regards to chronic low back pain secondary to lumbar spondylosis as well as chronic mid back pain and neck pain with myofascial features..  The patient currently reports low back pain that he rates an 8 out of 10 describes it as a constant burning, dull, aching, sharp, cramping and shooting pain.  Patient reports referred pain patterns into bilateral hips and buttocks.  It is made worse with prolonged sitting, prolonged standing and walking.  Patient recently underwent bilateral lumbar medial branch blocks and did note 80% relief initially for about the first 30 minutes following the procedure however his pain then  increased and then decreased again.  Patient presents today to discuss treatment options given the inconclusive pain diary.  Patient is taking pain medicine with minimal relief at this point.  He is also concerned about his weight gain and is interested in discussing weight loss strategies/efforts.    I have personally reviewed and/or updated the patient's past medical history, past surgical history, family history, social history, current medications, allergies, and vital signs today.       Review of Systems:    Review of Systems   Musculoskeletal:  Positive for gait problem.        Decreased ROM, joint stiffness.          Past Medical History:   Diagnosis Date   • Actinic keratosis     last assessed 10/14/13   • Anxiety    • Arthritis    • Chest pain    • Clotting disorder (HCC) 20 patient denies   • Depression    • Fibromyalgia, primary    • Headache(784.0)    • Herniated nucleus pulposus, L5-S1, right     last assessed 13   • Hyperlipidemia    • Hypertension    • Obesity    • Peptic ulceration    • Rheumatoid arthritis (HCC)    • Scoliosis        Past Surgical History:   Procedure Laterality Date   • BACK SURGERY     • COLONOSCOPY     • SPINE SURGERY  17   • UPPER GASTROINTESTINAL ENDOSCOPY         Family History   Problem Relation Age of Onset   • Hypertension Mother    • Arthritis Mother    • Asthma Mother    • Cancer Mother    • COPD Mother    • Crohn's disease Mother    • Diabetes Mother    • Osteoporosis Mother    • Cancer Father    • COPD Father    • Liver disease Father    • Mental illness Father    • Alcohol abuse Father          2016   • Bipolar disorder Sister    • Asthma Sister    • Cancer Sister    • COPD Sister    • Depression Sister    • Hypertension Sister    • Hypothyroidism Sister    • Mental illness Sister    • Anxiety disorder Sister    • Bipolar disorder Sister    • Scoliosis Sister    • Migraines Sister    • Heart defect Brother    • Hypertension  Brother    • Stroke Brother    • Hyperlipidemia Maternal Grandmother    • Hypertension Maternal Grandmother    • Hypertension Family    • Thyroid cancer Family    • Cancer Maternal Aunt         Cancer   • Colon polyps Neg Hx    • Colon cancer Neg Hx        Social History     Occupational History   • Occupation: full time   Tobacco Use   • Smoking status: Never   • Smokeless tobacco: Never   Vaping Use   • Vaping status: Never Used   Substance and Sexual Activity   • Alcohol use: Not Currently     Comment: social   • Drug use: No   • Sexual activity: Yes     Partners: Male         Current Outpatient Medications:   •  ALPRAZolam (XANAX) 0.5 mg tablet, One tab 1 hour before procedure can repeat at time of procedure, Disp: 2 tablet, Rfl: 0  •  buPROPion (WELLBUTRIN) 75 mg tablet, Take 1 tablet (75 mg total) by mouth 2 (two) times a day, Disp: 200 tablet, Rfl: 1  •  escitalopram (LEXAPRO) 10 mg tablet, Take 1 tablet (10 mg total) by mouth daily, Disp: 100 tablet, Rfl: 1  •  fenofibrate (TRICOR) 145 mg tablet, Take 1 tablet (145 mg total) by mouth daily, Disp: 100 tablet, Rfl: 1  •  gabapentin (NEURONTIN) 300 mg capsule, Take 1 capsule (300 mg total) by mouth 2 (two) times a day, Disp: 200 capsule, Rfl: 1  •  LORazepam (ATIVAN) 0.5 mg tablet, TAKE 1 TO 2 TABLETS BY MOUTH AT  BEDTIME AS DIRECTED, Disp: 60 tablet, Rfl: 1  •  metFORMIN (GLUCOPHAGE) 500 mg tablet, Take 1 tablet (500 mg total) by mouth 2 (two) times a day with meals, Disp: 180 tablet, Rfl: 1  •  methocarbamol (ROBAXIN) 500 mg tablet, Take 1 tablet (500 mg total) by mouth 4 (four) times a day (Patient taking differently: Take 500 mg by mouth if needed), Disp: 30 tablet, Rfl: 1  •  rosuvastatin (CRESTOR) 5 mg tablet, Take 1 tablet (5 mg total) by mouth daily, Disp: 100 tablet, Rfl: 1  •  traMADol (ULTRAM) 50 mg tablet, TAKE 1 TABLET BY MOUTH EVERY 6  HOURS AS NEEDED FOR MODERATE  PAIN, Disp: 120 tablet, Rfl: 1  •  traZODone (DESYREL) 50 mg tablet, Take 2 tablets  "(100 mg total) by mouth daily at bedtime, Disp: 200 tablet, Rfl: 1  •  valsartan (DIOVAN) 160 mg tablet, Take 1 tablet (160 mg total) by mouth daily, Disp: 100 tablet, Rfl: 1    No Known Allergies    Physical Exam:    Pulse 88   Temp 98.8 °F (37.1 °C)   Ht 6' 6\" (1.981 m) Comment: Verbal  Wt (!) 148 kg (326 lb)   BMI 37.67 kg/m²     Constitutional:normal, well developed, well nourished, alert, in no distress and non-toxic and no overt pain behavior. and obese  Eyes:anicteric  HEENT:grossly intact  Neck:supple, symmetric, trachea midline and no masses   Pulmonary:even and unlabored  Cardiovascular:No edema or pitting edema present  Skin:Normal without rashes or lesions and well hydrated  Psychiatric:Mood and affect appropriate  Neurologic:Cranial Nerves II-XII grossly intact  Musculoskeletal: Bilateral sacroiliac joint tenderness, + Mercedes finger sign + Patricks test +Gaenslen's test+ Posterior pelvic pain provocation very limited lumbar range of motion, barely able to extend      Imaging    MRI CERVICAL SPINE WITHOUT CONTRAST @  01/15/2025     INDICATION: M47.812: Spondylosis without myelopathy or radiculopathy, cervical region  M54.2: Cervicalgia.     COMPARISON: Cervical spine radiograph from 3/11/2024     TECHNIQUE:  Multiplanar, multisequence imaging of the cervical spine was performed. .        IMAGE QUALITY:  Diagnostic     FINDINGS:     ALIGNMENT: Straightening the cervical spine. No listhesis. No compression fracture.     MARROW SIGNAL:  Normal marrow signal is identified within the visualized bony structures.  No discrete marrow lesion.     CERVICAL AND VISUALIZED THORACIC CORD:  Normal signal within the visualized cord.     PREVERTEBRAL AND PARASPINAL SOFT TISSUES:  Normal.     VISUALIZED POSTERIOR FOSSA:  The visualized posterior fossa demonstrates no abnormal signal.     CERVICAL DISC SPACES:     C2-C3:  Normal.     C3-C4:  Normal.     C4-C5:  Normal.     C5-C6:  Normal.     C6-C7:  Normal.   "   C7-T1: Minimal bilateral facet arthropathy. Prominent perineural cysts in the bilateral foramina. Otherwise normal.     UPPER THORACIC DISC SPACES:  Normal.     OTHER FINDINGS:  None.     IMPRESSION:     Minimal cervical spondylosis, without canal or foraminal stenosis identified.

## 2025-02-07 ENCOUNTER — OFFICE VISIT (OUTPATIENT)
Dept: RHEUMATOLOGY | Facility: CLINIC | Age: 50
End: 2025-02-07

## 2025-02-07 VITALS
SYSTOLIC BLOOD PRESSURE: 136 MMHG | DIASTOLIC BLOOD PRESSURE: 90 MMHG | HEART RATE: 77 BPM | OXYGEN SATURATION: 96 % | HEIGHT: 78 IN | BODY MASS INDEX: 34.59 KG/M2 | WEIGHT: 299 LBS

## 2025-02-07 DIAGNOSIS — M54.2 NECK PAIN: ICD-10-CM

## 2025-02-07 DIAGNOSIS — M19.041 PRIMARY OSTEOARTHRITIS OF BOTH HANDS: ICD-10-CM

## 2025-02-07 DIAGNOSIS — M17.0 PRIMARY OSTEOARTHRITIS OF BOTH KNEES: Primary | ICD-10-CM

## 2025-02-07 DIAGNOSIS — M19.042 PRIMARY OSTEOARTHRITIS OF BOTH HANDS: ICD-10-CM

## 2025-02-07 DIAGNOSIS — M47.812 CERVICAL SPONDYLOSIS: ICD-10-CM

## 2025-02-07 DIAGNOSIS — M47.816 LUMBAR SPONDYLOSIS: ICD-10-CM

## 2025-02-07 DIAGNOSIS — M96.1 LUMBAR POSTLAMINECTOMY SYNDROME: ICD-10-CM

## 2025-02-07 NOTE — PROGRESS NOTES
Name: Joshua Caballero      : 1975      MRN: [id number]  Encounter Provider: Williams Rodriguez MD  Encounter Date: 2025   Encounter department: [facility] RHEUMATOLOGY [facility]  :  Assessment & Plan  Lumbar spondylosis  W/u with negative RF    Low titer RNP noted, complete KACIE profile negative    At this does not appear to have inflammatory arthritis such as rheumatoid arthritis, lupus etc    I have ordered CCP and and x-rays hands    Clinically more suggestive osteoarthritis    He is established with pain management and Rxd tramadol, robaxin and gabapentin     Will review test results and f/u as needed  Orders:    Ambulatory Referral to Rheumatology    XR knee 3 vw left non injury; Future    XR hand 3+ vw left; Future    XR hand 3+ vw right; Future    Cyclic citrul peptide antibody, IgG; Future    Lumbar postlaminectomy syndrome    Orders:    Ambulatory Referral to Rheumatology    XR knee 3 vw left non injury; Future    XR hand 3+ vw left; Future    XR hand 3+ vw right; Future    Cyclic citrul peptide antibody, IgG; Future    Cervical spondylosis    Orders:    Ambulatory Referral to Rheumatology    XR knee 3 vw left non injury; Future    XR hand 3+ vw left; Future    XR hand 3+ vw right; Future    Cyclic citrul peptide antibody, IgG; Future    Neck pain    Orders:    Ambulatory Referral to Rheumatology    XR knee 3 vw left non injury; Future    XR hand 3+ vw left; Future    XR hand 3+ vw right; Future    Cyclic citrul peptide antibody, IgG; Future        This is a Rheumatology Consult seen at the request of Dr. Dennis     History of Present Illness   HPI  Joshua Caballero is a 49 y.o. male who presents for further evaluation rheumatoid arthritis    He has past medical history diabetes mellitus, HTN, HLD, depression    Chronic low back pain, DDD, scoliosis. He was in a full body brace during high school    He has had lumbar surgery    Follows with closely with pain management here at  [facility] and gets injections    He was Rxd tramadol, robaxin and gabapentin for pain management     W/u with negative RF    Low titer RNP noted, complete KACIE profile negative    MRI cervical and lumbar spine c/w osteoarthritis    Knee films c/w osteoarthritis. Was seen per Orthopedics.     No rashes, psoriasis, Raynaud's, seizures or strokes, renal failure etc    Review of Systems  Pertinent Medical History         --------------------------------------------------------------------------------------------------------        ROS:    + as above with the addition of + sweats    - for: Fevers, Chills or sweats.  No HAs or scalp tenderness.  No jaw claudication.  No acute visual or eye changes.  No dry eyes.  No auditory complaints.  No oral lesions or ulcers.  No dry mouth.  No sore throat or cough.  No chest pains or palpitations.  No shortness of breath, dyspnea on exertion or wheezing.  No hemotpysis.  No abdominal pain, GERD symptoms, diarrhea or constipation.  No urinary complaints.  No numbness, tingling or weakness.  No rashes.    All other ROS was reviewed and negative except as above         --------------------------------------------------------------------------------------------------------    Past Medical History    Past Medical History:   Diagnosis Date    Actinic keratosis     last assessed 10/14/13    Anxiety     Arthritis     Chest pain     Clotting disorder ([facility]) 2015 5/30/23 patient denies    Depression     Fibromyalgia, primary     Headache(784.0)     Herniated nucleus pulposus, L5-S1, right     last assessed 1/21/13    Hyperlipidemia 1999    Hypertension 1997    Obesity     Peptic ulceration     Rheumatoid arthritis ([facility]) 1987    Scoliosis            Past Surgical History    Past Surgical History:   Procedure Laterality Date    BACK SURGERY      COLONOSCOPY      SPINE SURGERY  11/14/17    UPPER GASTROINTESTINAL ENDOSCOPY             Family History    Family History   Problem Relation Age of Onset     Hypertension Mother     Arthritis Mother     Asthma Mother     Cancer Mother     COPD Mother     Crohn's disease Mother     Diabetes Mother     Osteoporosis Mother     Cancer Father     COPD Father     Liver disease Father     Mental illness Father     Alcohol abuse Father          2016    Bipolar disorder Sister     Asthma Sister     Cancer Sister     COPD Sister     Depression Sister     Hypertension Sister     Hypothyroidism Sister     Mental illness Sister     Anxiety disorder Sister     Bipolar disorder Sister     Scoliosis Sister     Migraines Sister     Heart defect Brother     Hypertension Brother     Stroke Brother     Hyperlipidemia Maternal Grandmother     Hypertension Maternal Grandmother     Hypertension Family     Thyroid cancer Family     Cancer Maternal Aunt         Cancer    Colon polyps Neg Hx     Colon cancer Neg Hx             Social History    Social History     Tobacco Use    Smoking status: Never    Smokeless tobacco: Never   Vaping Use    Vaping status: Never Used   Substance Use Topics    Alcohol use: Not Currently     Comment: social    Drug use: No         Allergies    No Known Allergies      Medications    Current Outpatient Medications   Medication Instructions    ALPRAZolam (XANAX) 0.5 mg tablet One tab 1 hour before procedure can repeat at time of procedure    buPROPion (WELLBUTRIN) 75 mg, Oral, 2 times daily    escitalopram (LEXAPRO) 10 mg, Oral, Daily    fenofibrate (TRICOR) 145 mg, Oral, Daily    gabapentin (NEURONTIN) 300 mg, Oral, 2 times daily    LORazepam (ATIVAN) 0.5 mg tablet TAKE 1 TO 2 TABLETS BY MOUTH AT  BEDTIME AS DIRECTED    metFORMIN (GLUCOPHAGE) 500 mg, Oral, 2 times daily with meals    methocarbamol (ROBAXIN) 500 mg, Oral, 4 times daily    rosuvastatin (CRESTOR) 5 mg, Oral, Daily    traMADol (ULTRAM) 50 mg, Oral, Every 6 hours PRN    traZODone (DESYREL) 100 mg, Oral, Daily at bedtime    valsartan (DIOVAN) 160 mg, Oral, Daily     "          ________________________________________________________________________          Results Review    RIGHT KNEE     INDICATION:   Pain in right knee. Other chronic pain.        COMPARISON:  None.     VIEWS:  XR KNEE 3 VW RIGHT NON INJURY      FINDINGS:     There is no acute fracture or dislocation.     There is no joint effusion.     Mild narrowing of the medial compartment. Small osteophytes of the medial lateral tibial plateau. Minimal varus angulation. Small superior patellar enthesophyte.     No lytic or blastic osseous lesion.     Soft tissues are unremarkable.     IMPRESSION:        Mild medial compartment degenerative changes as above.  Minimal varus angulation.  Small patellar enthesopathy. No fracture               Objective   /90   Pulse 77   Ht 6' 6\" (1.981 m)   Wt 136 kg (299 lb)   SpO2 96%   BMI 34.55 kg/m²      Physical Exam    GEN: AAO, No apparent distress.  Patient is well developed.  HEENT:  Pupils are equal, round and reactive.  Sclera are clear.  Fundoscopic exam is normal.  External ears are without lesions.  Oral pharynx is clear of ulcers or other lesions.  MMM.   NECK:  Supple.  There is no adenopathy appreciable in anterior or posterior cervical chains or supraclavicularly.  JVP is normal.    HEART: Regular rate and rhythm.  There is no appreciable murmur, gallop or rub.  LUNGS: Clear to auscultation.  ABD:  Soft, without tenderness, rebound or guarding.  No appreciable organomegally.  NEURO: Speech and cognition are normal.  Strength is 5/5 throughout.  Tone is normal.  DTRs are 2/4 at the knees, ankles and elbows.  Gait is normal.  SKIN: There are no rashes or lesions    MUSCULOSKELETAL:   No overt synovitis noted          Thank you for involving me in this patient's care.        Williams Rodriguez MD  [facility]  "

## 2025-02-07 NOTE — ASSESSMENT & PLAN NOTE
W/u with negative RF    Low titer RNP noted, complete KACIE profile negative    At this does not appear to have inflammatory arthritis such as rheumatoid arthritis, lupus etc    I have ordered CCP and and x-rays hands    Clinically more suggestive osteoarthritis    He is established with pain management and Rxd tramadol, robaxin and gabapentin     Will review test results and f/u as needed  Orders:    Ambulatory Referral to Rheumatology    XR knee 3 vw left non injury; Future    XR hand 3+ vw left; Future    XR hand 3+ vw right; Future    Cyclic citrul peptide antibody, IgG; Future

## 2025-02-28 ENCOUNTER — HOSPITAL ENCOUNTER (OUTPATIENT)
Dept: RADIOLOGY | Facility: CLINIC | Age: 50
Discharge: HOME | End: 2025-02-28
Payer: COMMERCIAL

## 2025-02-28 VITALS
RESPIRATION RATE: 16 BRPM | TEMPERATURE: 97.1 F | SYSTOLIC BLOOD PRESSURE: 155 MMHG | HEART RATE: 76 BPM | OXYGEN SATURATION: 94 % | DIASTOLIC BLOOD PRESSURE: 85 MMHG

## 2025-02-28 DIAGNOSIS — M46.1 SACROILIITIS (HCC): ICD-10-CM

## 2025-02-28 PROCEDURE — 27096 INJECT SACROILIAC JOINT: CPT | Performed by: ANESTHESIOLOGY

## 2025-02-28 RX ORDER — METHYLPREDNISOLONE ACETATE 80 MG/ML
80 INJECTION, SUSPENSION INTRA-ARTICULAR; INTRALESIONAL; INTRAMUSCULAR; PARENTERAL; SOFT TISSUE ONCE
Status: COMPLETED | OUTPATIENT
Start: 2025-02-28 | End: 2025-02-28

## 2025-02-28 RX ORDER — ROPIVACAINE HYDROCHLORIDE 2 MG/ML
2 INJECTION, SOLUTION EPIDURAL; INFILTRATION; PERINEURAL ONCE
Status: COMPLETED | OUTPATIENT
Start: 2025-02-28 | End: 2025-02-28

## 2025-02-28 RX ADMIN — METHYLPREDNISOLONE ACETATE 80 MG: 80 INJECTION, SUSPENSION INTRA-ARTICULAR; INTRALESIONAL; INTRAMUSCULAR; SOFT TISSUE at 08:28

## 2025-02-28 RX ADMIN — IOHEXOL 0.4 ML: 300 INJECTION, SOLUTION INTRAVENOUS at 08:27

## 2025-02-28 RX ADMIN — ROPIVACAINE HYDROCHLORIDE 2 ML: 2 INJECTION EPIDURAL; INFILTRATION; PERINEURAL at 08:28

## 2025-02-28 NOTE — DISCHARGE INSTR - LAB

## 2025-02-28 NOTE — H&P
History of Present Illness: The patient is a 49 y.o. male who presents with complaints of low back pain.    Past Medical History:   Diagnosis Date    Actinic keratosis     last assessed 10/14/13    Anxiety     Arthritis     Chest pain     Clotting disorder (HCC) 2015 5/30/23 patient denies    Depression     Fibromyalgia, primary     Headache(784.0)     Herniated nucleus pulposus, L5-S1, right     last assessed 1/21/13    Hyperlipidemia 1999    Hypertension 1997    Obesity     Peptic ulceration     Rheumatoid arthritis (HCC) 1987    Scoliosis        Past Surgical History:   Procedure Laterality Date    BACK SURGERY      COLONOSCOPY      SPINE SURGERY  11/14/17    UPPER GASTROINTESTINAL ENDOSCOPY           Current Outpatient Medications:     ALPRAZolam (XANAX) 0.5 mg tablet, One tab 1 hour before procedure can repeat at time of procedure, Disp: 2 tablet, Rfl: 0    buPROPion (WELLBUTRIN) 75 mg tablet, Take 1 tablet (75 mg total) by mouth 2 (two) times a day, Disp: 200 tablet, Rfl: 1    escitalopram (LEXAPRO) 10 mg tablet, Take 1 tablet (10 mg total) by mouth daily, Disp: 100 tablet, Rfl: 1    fenofibrate (TRICOR) 145 mg tablet, Take 1 tablet (145 mg total) by mouth daily, Disp: 100 tablet, Rfl: 1    gabapentin (NEURONTIN) 300 mg capsule, Take 1 capsule (300 mg total) by mouth 2 (two) times a day, Disp: 200 capsule, Rfl: 1    LORazepam (ATIVAN) 0.5 mg tablet, TAKE 1 TO 2 TABLETS BY MOUTH AT  BEDTIME AS DIRECTED, Disp: 60 tablet, Rfl: 1    metFORMIN (GLUCOPHAGE) 500 mg tablet, Take 1 tablet (500 mg total) by mouth 2 (two) times a day with meals, Disp: 180 tablet, Rfl: 1    methocarbamol (ROBAXIN) 500 mg tablet, Take 1 tablet (500 mg total) by mouth 4 (four) times a day (Patient taking differently: Take 500 mg by mouth if needed), Disp: 30 tablet, Rfl: 1    rosuvastatin (CRESTOR) 5 mg tablet, Take 1 tablet (5 mg total) by mouth daily, Disp: 100 tablet, Rfl: 1    traMADol (ULTRAM) 50 mg tablet, TAKE 1 TABLET BY MOUTH  EVERY 6  HOURS AS NEEDED FOR MODERATE  PAIN, Disp: 120 tablet, Rfl: 1    traZODone (DESYREL) 50 mg tablet, Take 2 tablets (100 mg total) by mouth daily at bedtime, Disp: 200 tablet, Rfl: 1    valsartan (DIOVAN) 160 mg tablet, Take 1 tablet (160 mg total) by mouth daily, Disp: 100 tablet, Rfl: 1    Current Facility-Administered Medications:     iohexol (OMNIPAQUE) 300 mg/mL injection 0.4 mL, 0.4 mL, Intra-articular, Once, Severino Lewis DO    methylPREDNISolone acetate (DEPO-MEDROL) injection 80 mg, 80 mg, Intra-articular, Once, Severino Lewis DO    ropivacaine (NAROPIN) injection 2 mL, 2 mL, Intra-articular, Once, Severino Lewis DO    No Known Allergies    Physical Exam:   General: Awake, Alert, Oriented x 3, Mood and affect appropriate  Respiratory: Respirations even and unlabored  Cardiovascular: Peripheral pulses intact; no edema  Musculoskeletal Exam: Normal gait    ASA Score: 2         Assessment:   1. Sacroiliitis (HCC)        Plan: B/L SIJ

## 2025-03-14 ENCOUNTER — TELEPHONE (OUTPATIENT)
Dept: PAIN MEDICINE | Facility: CLINIC | Age: 50
End: 2025-03-14

## 2025-03-21 DIAGNOSIS — F41.1 GENERALIZED ANXIETY DISORDER: ICD-10-CM

## 2025-03-21 DIAGNOSIS — F51.05 INSOMNIA DUE TO OTHER MENTAL DISORDER: ICD-10-CM

## 2025-03-21 DIAGNOSIS — E78.5 HYPERLIPIDEMIA, UNSPECIFIED HYPERLIPIDEMIA TYPE: ICD-10-CM

## 2025-03-21 DIAGNOSIS — F33.2 SEVERE RECURRENT MAJOR DEPRESSION WITHOUT PSYCHOTIC FEATURES (HCC): ICD-10-CM

## 2025-03-21 DIAGNOSIS — F99 INSOMNIA DUE TO OTHER MENTAL DISORDER: ICD-10-CM

## 2025-03-21 DIAGNOSIS — S39.92XA INJURY OF BACK, INITIAL ENCOUNTER: ICD-10-CM

## 2025-03-21 DIAGNOSIS — R73.01 IMPAIRED FASTING GLUCOSE: ICD-10-CM

## 2025-03-21 DIAGNOSIS — M51.369 BULGING LUMBAR DISC: ICD-10-CM

## 2025-03-21 DIAGNOSIS — M79.18 MYOFASCIAL PAIN SYNDROME: ICD-10-CM

## 2025-03-21 DIAGNOSIS — R20.0 NUMBNESS: ICD-10-CM

## 2025-03-21 DIAGNOSIS — I10 PRIMARY HYPERTENSION: ICD-10-CM

## 2025-03-22 RX ORDER — BUPROPION HYDROCHLORIDE 75 MG/1
75 TABLET ORAL 2 TIMES DAILY
Qty: 180 TABLET | Refills: 1 | Status: SHIPPED | OUTPATIENT
Start: 2025-03-22

## 2025-03-22 RX ORDER — VALSARTAN 160 MG/1
160 TABLET ORAL DAILY
Qty: 90 TABLET | Refills: 1 | Status: SHIPPED | OUTPATIENT
Start: 2025-03-22

## 2025-03-22 RX ORDER — GABAPENTIN 300 MG/1
300 CAPSULE ORAL 2 TIMES DAILY
Qty: 180 CAPSULE | Refills: 1 | Status: SHIPPED | OUTPATIENT
Start: 2025-03-22

## 2025-03-22 RX ORDER — TRAZODONE HYDROCHLORIDE 50 MG/1
100 TABLET ORAL
Qty: 180 TABLET | Refills: 1 | Status: SHIPPED | OUTPATIENT
Start: 2025-03-22

## 2025-03-22 RX ORDER — ESCITALOPRAM OXALATE 10 MG/1
10 TABLET ORAL DAILY
Qty: 90 TABLET | Refills: 1 | Status: SHIPPED | OUTPATIENT
Start: 2025-03-22

## 2025-03-22 RX ORDER — FENOFIBRATE 145 MG/1
145 TABLET, COATED ORAL DAILY
Qty: 90 TABLET | Refills: 1 | Status: SHIPPED | OUTPATIENT
Start: 2025-03-22

## 2025-03-22 RX ORDER — ROSUVASTATIN CALCIUM 5 MG/1
5 TABLET, COATED ORAL DAILY
Qty: 90 TABLET | Refills: 1 | Status: SHIPPED | OUTPATIENT
Start: 2025-03-22

## 2025-03-24 RX ORDER — METHOCARBAMOL 500 MG/1
500 TABLET, FILM COATED ORAL 4 TIMES DAILY
Qty: 30 TABLET | Refills: 1 | Status: SHIPPED | OUTPATIENT
Start: 2025-03-24

## 2025-03-24 RX ORDER — TRAMADOL HYDROCHLORIDE 50 MG/1
50 TABLET ORAL EVERY 6 HOURS PRN
Qty: 120 TABLET | Refills: 0 | OUTPATIENT
Start: 2025-03-24

## 2025-03-24 RX ORDER — LORAZEPAM 0.5 MG/1
TABLET ORAL
Qty: 60 TABLET | Refills: 0 | OUTPATIENT
Start: 2025-03-24

## 2025-03-24 RX ORDER — LORAZEPAM 0.5 MG/1
TABLET ORAL
Qty: 60 TABLET | Refills: 0 | Status: SHIPPED | OUTPATIENT
Start: 2025-03-24

## 2025-04-03 ENCOUNTER — OFFICE VISIT (OUTPATIENT)
Dept: BARIATRICS | Facility: CLINIC | Age: 50
End: 2025-04-03
Payer: COMMERCIAL

## 2025-04-03 VITALS
BODY MASS INDEX: 34.41 KG/M2 | TEMPERATURE: 98.2 F | HEIGHT: 78 IN | DIASTOLIC BLOOD PRESSURE: 86 MMHG | RESPIRATION RATE: 16 BRPM | SYSTOLIC BLOOD PRESSURE: 132 MMHG | HEART RATE: 82 BPM | WEIGHT: 297.4 LBS

## 2025-04-03 DIAGNOSIS — F99 INSOMNIA DUE TO OTHER MENTAL DISORDER: ICD-10-CM

## 2025-04-03 DIAGNOSIS — F51.05 INSOMNIA DUE TO OTHER MENTAL DISORDER: ICD-10-CM

## 2025-04-03 DIAGNOSIS — M79.18 MYOFASCIAL PAIN SYNDROME: ICD-10-CM

## 2025-04-03 DIAGNOSIS — E66.811 CLASS 1 OBESITY DUE TO EXCESS CALORIES WITH BODY MASS INDEX (BMI) OF 34.0 TO 34.9 IN ADULT: Primary | ICD-10-CM

## 2025-04-03 DIAGNOSIS — E66.9 TYPE 2 DIABETES MELLITUS WITH OBESITY  (HCC): ICD-10-CM

## 2025-04-03 DIAGNOSIS — M51.369 BULGING LUMBAR DISC: ICD-10-CM

## 2025-04-03 DIAGNOSIS — E66.09 CLASS 1 OBESITY DUE TO EXCESS CALORIES WITH BODY MASS INDEX (BMI) OF 34.0 TO 34.9 IN ADULT: Primary | ICD-10-CM

## 2025-04-03 DIAGNOSIS — E66.9 OBESITY (BMI 35.0-39.9 WITHOUT COMORBIDITY): ICD-10-CM

## 2025-04-03 DIAGNOSIS — F41.1 GENERALIZED ANXIETY DISORDER: ICD-10-CM

## 2025-04-03 DIAGNOSIS — E11.69 TYPE 2 DIABETES MELLITUS WITH OBESITY  (HCC): ICD-10-CM

## 2025-04-03 PROCEDURE — 99204 OFFICE O/P NEW MOD 45 MIN: CPT | Performed by: STUDENT IN AN ORGANIZED HEALTH CARE EDUCATION/TRAINING PROGRAM

## 2025-04-03 NOTE — PROGRESS NOTES
Assessment & Plan  Class 1 obesity    Type 2 diabetes mellitus with obesity  (HCC)    Lab Results   Component Value Date    HGBA1C 7.8 (H) 12/03/2024     Current weight: 297    Medication: Starting Ozempic 0.25, increase to 0.5 after 4 weeks     Comorbidities include MDD, sacroiliitis,  uncontrolled type 2 diabetes, currently on metformin.    Patient understands the importance of making lifestyle changes as recommended below to aid in weight loss     Dietary Recommendations:  Recommend to avoid skipping any meals. Adequate amount of Macronutrients (minimal 3 meals a day) is necessary to help improve metabolism, satiety and allow for better portion control by decreasing Ghrelin (hunger hormone). Lack of hunger can be suppressed by a hormone called Leptin (full hormone) which can occur from a previous meal or caffeine intake    Protein intake throughout the day can help promote satiety and is necessary for muscle growth/repair    Carbohydrates are essential as it is the vital source of fuel for daily activities. energy, cell function, nutrient absorption, and hormone production.     Fats: Essential vitamins like A, D, and E, support cell growth, function and are necessary for nutrient absorption to support your organs     Fluid intake which is at least half your body weight in ounces is necessary to help control cravings (decreasing confusion for appetite vs water deprivation) as the human body is made up of 50-70% of Fluids. If there is a diversion for water alone, would recommend flavored water (example-splash of lemonade or ice tea) to help promote compliance. Fluids include Teas, water, flavored water, seltzer water, coffee, shakes    Metabolism:    Metabolism can also be promoted by macronutrient intake and increased muscle weight via thermogenesis      Daily Calorie Needs: Recommend to take into account any fluid losses and calories burned via increased activity levels as daily calories may need to be  "adjusted     Weight check: Weights can fluctuate depending on fluid shifts vs what foods are consumed prior to checking your weight    Recent notes, labs and records were reviewed. Total time with chart review and with the patient: 45 min            Return in about 2 months (around 6/11/2025) for followup .   ______________________________________________________________________        Subjective:     Chief Complaint   Patient presents with    Consult     MWM Consult; Waist 48in; Stop Bang 5/8       HPI: 49 y.o. male with pmh of hyperlipidemia, sacroiliitis, MDD, type 2 diabetes presents to the weight management clinic for consultation.     Wt Loss History:   -Onset: Since 16 year olds   -INH - TB in the past   -Phenylpropanolamine-> appetite stimulant; was 400 and dropped to 180-210.   -2017-> [facility] program [facility] program-> 60lbs      Dietary Regimen:  Lunch: 10:30-11am->grilled cheese stomach, pancakes   Dinner: Blue Dishes: salads, chicken              Cravings: none   Meals Skipped: Breakfast   Fluids: 6-7 cups Water 3-16oz (flavored sticks)     Lifestyle hx:  Exercise: Walking  Live  and Mother   Occupation: [employer] rel[employer], , [employer] part time (5:30-8pm)     Review Of Systems:  General: No pallor, no weakness   Pulmonary: Negative for shortness of breath  Chest: negative for chest pain  Gastrointestinal:  Negative for abdominal pain, diarrhea   Psychiatric/Behavioral:  Negative for behavioral problems, confusion, dysphoric mood and hallucinations.    All other systems reviewed and are negative.     Objective:  /86 (BP Location: Left arm, Patient Position: Sitting)   Pulse 82   Temp 98.2 °F (36.8 °C) (Tympanic)   Resp 16   Ht 6' 6\" (1.981 m)   Wt 135 kg (297 lb 6.4 oz)   BMI 34.37 kg/m²     Wt Readings from Last 30 Encounters:   04/03/25 135 kg (297 lb 6.4 oz)   02/07/25 136 kg (299 lb)   01/29/25 (!) 148 kg (326 lb)   01/07/25 (!) 144 kg (318 lb)   12/03/24 (!) 137 kg (302 lb) "   11/19/24 (!) 137 kg (302 lb)   11/07/24 135 kg (297 lb)   10/29/24 135 kg (297 lb 9.6 oz)   04/16/24 135 kg (298 lb 6.4 oz)   10/11/23 (!) 138 kg (304 lb)   06/23/23 136 kg (298 lb 12.8 oz)   05/30/23 132 kg (290 lb)   04/20/23 132 kg (291 lb)   03/02/23 132 kg (290 lb 9.6 oz)   08/30/22 136 kg (298 lb 12.8 oz)   02/24/22 131 kg (288 lb 6.4 oz)   08/18/21 131 kg (288 lb)   02/12/21 132 kg (291 lb 3.2 oz)   08/13/20 132 kg (290 lb)   02/12/20 132 kg (290 lb 6.4 oz)   08/08/19 125 kg (275 lb 9.6 oz)   05/07/18 119 kg (262 lb)   03/19/18 117 kg (259 lb)   01/08/18 118 kg (260 lb)   12/06/17 117 kg (258 lb)   11/06/17 118 kg (260 lb)   10/11/17 118 kg (261 lb)   10/02/17 119 kg (262 lb)   11/14/16 121 kg (266 lb)   05/24/16 122 kg (269 lb)       Physical Exam  Constitutional:       General: No acute distress.  Well-nourished  HENT:      Head: Normocephalic and atraumatic.   Eyes:      Extraocular Movements: Extraocular movements intact.      Conjunctiva/pupils: Conjunctivae normal. Pupils are equal, round  Pulmonary:      Effort: Pulmonary effort is normal. No labored breathing   Neurological:      General: No focal deficit present.  AO x 3     Mental Status: Alert and oriented to person, place, and time. Mental status is at baseline.   Psychiatric:         Mood and Affect: Mood normal.         Behavior: Behavior normal.     Labs and Imaging  Recent labs and imaging have been personally reviewed.

## 2025-04-04 ENCOUNTER — OFFICE VISIT (OUTPATIENT)
Dept: PAIN MEDICINE | Facility: CLINIC | Age: 50
End: 2025-04-04
Payer: COMMERCIAL

## 2025-04-04 ENCOUNTER — APPOINTMENT (OUTPATIENT)
Dept: RADIOLOGY | Facility: CLINIC | Age: 50
End: 2025-04-04
Payer: COMMERCIAL

## 2025-04-04 VITALS
BODY MASS INDEX: 34.36 KG/M2 | HEART RATE: 77 BPM | OXYGEN SATURATION: 93 % | WEIGHT: 297 LBS | TEMPERATURE: 97.7 F | HEIGHT: 78 IN

## 2025-04-04 DIAGNOSIS — M47.816 LUMBAR SPONDYLOSIS: ICD-10-CM

## 2025-04-04 DIAGNOSIS — M17.0 PRIMARY OSTEOARTHRITIS OF BOTH KNEES: ICD-10-CM

## 2025-04-04 DIAGNOSIS — M96.1 LUMBAR POSTLAMINECTOMY SYNDROME: Primary | ICD-10-CM

## 2025-04-04 DIAGNOSIS — M96.1 LUMBAR POSTLAMINECTOMY SYNDROME: ICD-10-CM

## 2025-04-04 DIAGNOSIS — M19.042 PRIMARY OSTEOARTHRITIS OF BOTH HANDS: ICD-10-CM

## 2025-04-04 DIAGNOSIS — M47.812 CERVICAL SPONDYLOSIS: ICD-10-CM

## 2025-04-04 DIAGNOSIS — M19.041 PRIMARY OSTEOARTHRITIS OF BOTH HANDS: ICD-10-CM

## 2025-04-04 DIAGNOSIS — M46.1 SACROILIITIS (HCC): ICD-10-CM

## 2025-04-04 DIAGNOSIS — M54.2 NECK PAIN: ICD-10-CM

## 2025-04-04 PROCEDURE — 99213 OFFICE O/P EST LOW 20 MIN: CPT | Performed by: PHYSICIAN ASSISTANT

## 2025-04-04 PROCEDURE — 73130 X-RAY EXAM OF HAND: CPT

## 2025-04-04 PROCEDURE — 73562 X-RAY EXAM OF KNEE 3: CPT

## 2025-04-04 RX ORDER — TRAMADOL HYDROCHLORIDE 50 MG/1
50 TABLET ORAL EVERY 6 HOURS PRN
Qty: 120 TABLET | Refills: 0 | Status: SHIPPED | OUTPATIENT
Start: 2025-04-04

## 2025-04-04 RX ORDER — LORAZEPAM 0.5 MG/1
TABLET ORAL
Qty: 60 TABLET | Refills: 1 | Status: SHIPPED | OUTPATIENT
Start: 2025-04-04

## 2025-04-04 NOTE — PROGRESS NOTES
Assessment:  1. Lumbar postlaminectomy syndrome    2. Sacroiliitis (HCC)    3. Lumbar spondylosis        Plan:  While the patient was in the office today, I did have a thorough conversation regarding their chronic pain syndrome, medication management, and treatment plan options.    Unfortunately the patient did not notice more than 10% relief with SI joint injections done on 2/28/2025.  Prior to that the patient underwent diagnostic lumbar medial did not sustain a positive response, therefore radiofrequency ablation is not indicated.  We discussed that at this point there is nothing further to offer him from an interventional standpoint.    Consider gentle chiropractic therapy.    Consider increasing gabapentin.    Consider spinal cord stimulator trial.  Literature provided on today's visit.    Refer to Dr. Larios to consider trigger point therapy.    The patient was advised to contact the office should their symptoms worsen in the interim. The patient was agreeable and verbalized an understanding.        History of Present Illness:    The patient is a 49 y.o. male last seen on 2/28/2025 who presents for a follow up office visit in regards to chronic low back pain secondary to lumbar spondylosis and a lumbar postlaminectomy pain syndrome.  The patient currently reports low back pain that he rates a 7 out of 10 and describes as a constant burning, dull, aching, throbbing, cramping type pain.  Patient has numbness and paresthesias in bilateral feet secondary to neuropathy.  On 2/28/2025 patient underwent bilateral SI joint injections with only about 10% relief for a short period of time.  Prior to that patient underwent diagnostic lumbar medial branch blocks but did not sustain a positive response.  Patient has tried chiropractic therapy with minimal relief.    I have personally reviewed and/or updated the patient's past medical history, past surgical history, family history, social history, current medications,  allergies, and vital signs today.       Review of Systems:    Review of Systems   Respiratory:  Negative for shortness of breath.    Cardiovascular:  Negative for chest pain.   Gastrointestinal:  Negative for constipation, diarrhea, nausea and vomiting.   Musculoskeletal:  Positive for arthralgias, gait problem and joint swelling. Negative for myalgias.   Skin:  Negative for rash.   Neurological:  Negative for dizziness, seizures and weakness.   Psychiatric/Behavioral:  Negative for dysphoric mood.    All other systems reviewed and are negative.        Past Medical History:   Diagnosis Date   • Actinic keratosis     last assessed 10/14/13   • Anxiety    • Arthritis    • Chest pain    • Clotting disorder (HCC) 20 patient denies   • Depression    • Fibromyalgia, primary    • Headache(784.0)    • Herniated nucleus pulposus, L5-S1, right     last assessed 13   • Hyperlipidemia    • Hypertension    • Obesity    • Peptic ulceration    • Rheumatoid arthritis ([facility])    • Scoliosis        Past Surgical History:   Procedure Laterality Date   • BACK SURGERY     • COLONOSCOPY     • SPINE SURGERY  15   • UPPER GASTROINTESTINAL ENDOSCOPY         Family History   Problem Relation Age of Onset   • Hypertension Mother    • Arthritis Mother    • Asthma Mother    • Cancer Mother    • COPD Mother    • Crohn's disease Mother    • Diabetes Mother    • Osteoporosis Mother    • Cancer Father    • COPD Father    • Liver disease Father    • Mental illness Father    • Alcohol abuse Father          2016   • Bipolar disorder Sister    • Asthma Sister    • Cancer Sister    • COPD Sister    • Depression Sister    • Hypertension Sister    • Hypothyroidism Sister    • Mental illness Sister    • Anxiety disorder Sister    • Bipolar disorder Sister    • Scoliosis Sister    • Migraines Sister    • Heart defect Brother    • Hypertension Brother    • Stroke Brother    • Hyperlipidemia Maternal Grandmother    •  Hypertension Maternal Grandmother    • Hypertension Family    • Thyroid cancer Family    • Cancer Maternal Aunt         Cancer   • Colon polyps Neg Hx    • Colon cancer Neg Hx        Social History     Occupational History   • Occupation: full time   Tobacco Use   • Smoking status: Never   • Smokeless tobacco: Never   Vaping Use   • Vaping status: Never Used   Substance and Sexual Activity   • Alcohol use: Yes     Comment: socially- extremely rare   • Drug use: No   • Sexual activity: Yes     Partners: Male         Current Outpatient Medications:   •  ALPRAZolam (XANAX) 0.5 mg tablet, One tab 1 hour before procedure can repeat at time of procedure, Disp: 2 tablet, Rfl: 0  •  buPROPion (WELLBUTRIN) 75 mg tablet, Take 1 tablet (75 mg total) by mouth 2 (two) times a day, Disp: 180 tablet, Rfl: 1  •  escitalopram (LEXAPRO) 10 mg tablet, Take 1 tablet (10 mg total) by mouth daily, Disp: 90 tablet, Rfl: 1  •  fenofibrate (TRICOR) 145 mg tablet, Take 1 tablet (145 mg total) by mouth daily, Disp: 90 tablet, Rfl: 1  •  gabapentin (NEURONTIN) 300 mg capsule, Take 1 capsule (300 mg total) by mouth 2 (two) times a day, Disp: 180 capsule, Rfl: 1  •  LORazepam (ATIVAN) 0.5 mg tablet, TAKE 1 TO 2 TABLETS BY MOUTH AT  BEDTIME AS DIRECTED, Disp: 60 tablet, Rfl: 0  •  metFORMIN (GLUCOPHAGE) 500 mg tablet, TAKE 1 TABLET BY MOUTH TWICE  DAILY WITH MEALS, Disp: 180 tablet, Rfl: 1  •  methocarbamol (ROBAXIN) 500 mg tablet, TAKE 1 TABLET BY MOUTH 4 TIMES  DAILY, Disp: 30 tablet, Rfl: 1  •  rosuvastatin (CRESTOR) 5 mg tablet, Take 1 tablet (5 mg total) by mouth daily, Disp: 90 tablet, Rfl: 1  •  traMADol (ULTRAM) 50 mg tablet, TAKE 1 TABLET BY MOUTH EVERY 6  HOURS AS NEEDED FOR MODERATE  PAIN, Disp: 120 tablet, Rfl: 1  •  traZODone (DESYREL) 50 mg tablet, Take 2 tablets (100 mg total) by mouth daily at bedtime, Disp: 180 tablet, Rfl: 1  •  valsartan (DIOVAN) 160 mg tablet, Take 1 tablet (160 mg total) by mouth daily, Disp: 90 tablet, Rfl:  "1  •  semaglutide, 0.25 or 0.5 mg/dose, (Ozempic, 0.25 or 0.5 MG/DOSE,) 2 mg/3 mL injection pen, Inject 0.375 mL (0.25 mg total) under the skin every 7 days for 30 days, THEN 0.75 mL (0.5 mg total) every 7 days. (Patient not taking: Reported on 4/4/2025), Disp: 6 mL, Rfl: 0    No Known Allergies    Physical Exam:    Pulse 77   Temp 97.7 °F (36.5 °C)   Ht 6' 6\" (1.981 m)   Wt 135 kg (297 lb)   SpO2 93%   BMI 34.32 kg/m²     Constitutional:normal, well developed, well nourished, alert, in no distress and non-toxic and no overt pain behavior. and overweight  Eyes:anicteric  HEENT:grossly intact  Neck:supple, symmetric, trachea midline and no masses   Pulmonary:even and unlabored  Cardiovascular:No edema or pitting edema present  Skin:Normal without rashes or lesions and well hydrated  Psychiatric:Mood and affect appropriate  Neurologic:Cranial Nerves II-XII grossly intact  Musculoskeletal: Stable gait      Imaging  No orders to display         No orders of the defined types were placed in this encounter.      "

## 2025-04-07 DIAGNOSIS — E66.9 TYPE 2 DIABETES MELLITUS WITH OBESITY  (HCC): ICD-10-CM

## 2025-04-07 DIAGNOSIS — E66.811 CLASS 1 OBESITY DUE TO EXCESS CALORIES WITH BODY MASS INDEX (BMI) OF 34.0 TO 34.9 IN ADULT: ICD-10-CM

## 2025-04-07 DIAGNOSIS — E66.09 CLASS 1 OBESITY DUE TO EXCESS CALORIES WITH BODY MASS INDEX (BMI) OF 34.0 TO 34.9 IN ADULT: ICD-10-CM

## 2025-04-07 DIAGNOSIS — E11.69 TYPE 2 DIABETES MELLITUS WITH OBESITY  (HCC): ICD-10-CM

## 2025-04-08 ENCOUNTER — TELEPHONE (OUTPATIENT)
Age: 50
End: 2025-04-08

## 2025-04-08 NOTE — TELEPHONE ENCOUNTER
How are you tolerating the medication?   [] Nausea  [] Vomiting  [] Diarrhea  [x] Asymptomatic  [] Other:    Last visit weight: 297    Current weight: 297    Date of last injection: not taking    How many injections do you have left: N/A      Patient is requesting that the semaglutide, 0.25 or 0.5 mg/dose, (Ozempic, 0.25 or 0.5 MG/DOSE,) 2 mg/3 mL injection pen be sent to [facility] instead of [facility] because it is cheaper.

## 2025-04-09 DIAGNOSIS — E66.811 CLASS 1 OBESITY DUE TO EXCESS CALORIES WITH BODY MASS INDEX (BMI) OF 34.0 TO 34.9 IN ADULT: ICD-10-CM

## 2025-04-09 DIAGNOSIS — E66.09 CLASS 1 OBESITY DUE TO EXCESS CALORIES WITH BODY MASS INDEX (BMI) OF 34.0 TO 34.9 IN ADULT: ICD-10-CM

## 2025-04-09 DIAGNOSIS — E11.69 TYPE 2 DIABETES MELLITUS WITH OBESITY  (HCC): ICD-10-CM

## 2025-04-09 DIAGNOSIS — E66.9 TYPE 2 DIABETES MELLITUS WITH OBESITY  (HCC): ICD-10-CM

## 2025-04-10 ENCOUNTER — TELEPHONE (OUTPATIENT)
Dept: BARIATRICS | Facility: CLINIC | Age: 50
End: 2025-04-10

## 2025-04-10 NOTE — TELEPHONE ENCOUNTER
PA for ozempic .25mgSUBMITTED to [facility]    via    []CMM-KEY: [id number]  []Surescripts-Case ID #   []Availity-Auth ID # NDC #   []Faxed to plan   []Other website   []Phone call Case ID #     []PA sent as URGENT    All office notes, labs and other pertaining documents and studies sent. Clinical questions answered. Awaiting determination from insurance company.     Turnaround time for your insurance to make a decision on your Prior Authorization can take 7-21 business days.

## 2025-04-11 NOTE — TELEPHONE ENCOUNTER
PA for ozempic .25mg  APPROVED     Date(s) approved now -04/10/2026    Case #    Patient advised by          [x]Fipeohart Message  [x]Phone call   []LMOM  []L/M to call office as no active Communication consent on file  []Unable to leave detailed message as VM not approved on Communication consent       Pharmacy advised by    [x]Fax  []Phone call  []Secure Chat    Specialty Pharmacy    []     Approval letter scanned into Media No cover my meds

## 2025-04-28 ENCOUNTER — OFFICE VISIT (OUTPATIENT)
Dept: FAMILY MEDICINE CLINIC | Facility: CLINIC | Age: 50
End: 2025-04-28
Payer: COMMERCIAL

## 2025-04-28 VITALS
SYSTOLIC BLOOD PRESSURE: 138 MMHG | HEART RATE: 78 BPM | OXYGEN SATURATION: 96 % | TEMPERATURE: 97.9 F | WEIGHT: 296.8 LBS | DIASTOLIC BLOOD PRESSURE: 88 MMHG | BODY MASS INDEX: 34.34 KG/M2 | HEIGHT: 78 IN | RESPIRATION RATE: 16 BRPM

## 2025-04-28 DIAGNOSIS — Z00.01 ENCOUNTER FOR WELL ADULT EXAM WITH ABNORMAL FINDINGS: Primary | ICD-10-CM

## 2025-04-28 DIAGNOSIS — E66.9 OBESITY (BMI 30-39.9): ICD-10-CM

## 2025-04-28 DIAGNOSIS — R73.01 IMPAIRED FASTING GLUCOSE: ICD-10-CM

## 2025-04-28 DIAGNOSIS — Z13.6 SCREENING FOR CARDIOVASCULAR CONDITION: ICD-10-CM

## 2025-04-28 PROCEDURE — 99396 PREV VISIT EST AGE 40-64: CPT | Performed by: FAMILY MEDICINE

## 2025-04-28 NOTE — PROGRESS NOTES
"Name: Joshua Caballero      : 1975      MRN: [id number]  Encounter Provider: Uriel Dennis DO  Encounter Date: 2025   Encounter department: [facility]  :  Assessment & Plan  Impaired fasting glucose    Orders:    Comprehensive metabolic panel; Future    Hemoglobin A1C; Future    Albumin / creatinine urine ratio; Future    Obesity (BMI 30-39.9)      Orders:    Comprehensive metabolic panel; Future    Hemoglobin A1C; Future    Albumin / creatinine urine ratio; Future    Screening for cardiovascular condition    Orders:    CT coronary calcium score; Future    Labs ordered  Will continue current medications  Controlled substance contract updated  Can follow-up in 6 months or sooner if needed      Depression Screening and Follow-up Plan: Patient's depression screening was positive with a PHQ-9 score of 12.   Patient with underlying depression and was advised to continue current medications as prescribed.       History of Present Illness   Patient presents today for check of chronic this is done overall he is doing well with no major complaints  He recently started on Ozempic for weight loss and sugars  His back is inoperable but is stable  He is stable on his current medications and compliant with the PDMP      Review of Systems   Constitutional: Negative.    HENT: Negative.     Eyes: Negative.    Respiratory: Negative.     Cardiovascular: Negative.    Gastrointestinal: Negative.    Endocrine: Negative.    Genitourinary: Negative.    Musculoskeletal: Negative.    Skin: Negative.    Allergic/Immunologic: Negative.    Neurological: Negative.    Hematological: Negative.    Psychiatric/Behavioral: Negative.     All other systems reviewed and are negative.      Objective   /88   Pulse 78   Temp 97.9 °F (36.6 °C) (Tympanic)   Resp 16   Ht 6' 6\" (1.981 m)   Wt 135 kg (296 lb 12.8 oz)   SpO2 96%   BMI 34.30 kg/m²      Physical Exam  Vitals and nursing note reviewed. "   Constitutional:       Appearance: Normal appearance. He is well-developed.   HENT:      Head: Normocephalic.      Right Ear: External ear normal.      Left Ear: External ear normal.      Nose: Nose normal.   Eyes:      Conjunctiva/sclera: Conjunctivae normal.      Pupils: Pupils are equal, round, and reactive to light.   Cardiovascular:      Rate and Rhythm: Normal rate and regular rhythm.      Heart sounds: Normal heart sounds.   Pulmonary:      Effort: Pulmonary effort is normal.      Breath sounds: Normal breath sounds.   Abdominal:      General: Bowel sounds are normal.      Palpations: Abdomen is soft.   Musculoskeletal:         General: Normal range of motion.      Cervical back: Normal range of motion and neck supple.   Skin:     General: Skin is warm and dry.   Neurological:      General: No focal deficit present.      Mental Status: He is alert and oriented to person, place, and time.   Psychiatric:         Behavior: Behavior normal.         Thought Content: Thought content normal.         Judgment: Judgment normal.

## 2025-05-09 LAB
ALBUMIN SERPL-MCNC: 4.5 G/DL (ref 3.8–4.9)
ALBUMIN/CREAT UR: 8 MG/G CREAT (ref 0–29)
ALP SERPL-CCNC: 65 IU/L (ref 44–121)
ALT SERPL-CCNC: 30 IU/L (ref 0–44)
AST SERPL-CCNC: 26 IU/L (ref 0–40)
BILIRUB SERPL-MCNC: 0.2 MG/DL (ref 0–1.2)
BUN SERPL-MCNC: 10 MG/DL (ref 6–24)
BUN/CREAT SERPL: 10 (ref 9–20)
CALCIUM SERPL-MCNC: 9.5 MG/DL (ref 8.7–10.2)
CHLORIDE SERPL-SCNC: 102 MMOL/L (ref 96–106)
CO2 SERPL-SCNC: 20 MMOL/L (ref 20–29)
CREAT SERPL-MCNC: 1.05 MG/DL (ref 0.76–1.27)
CREAT UR-MCNC: 208.8 MG/DL
EGFR: 86 ML/MIN/1.73
EST. AVERAGE GLUCOSE BLD GHB EST-MCNC: 160 MG/DL
GLOBULIN SER-MCNC: 2.8 G/DL (ref 1.5–4.5)
GLUCOSE SERPL-MCNC: 129 MG/DL (ref 70–99)
HBA1C MFR BLD: 7.2 % (ref 4.8–5.6)
MICROALBUMIN UR-MCNC: 17.7 UG/ML
POTASSIUM SERPL-SCNC: 4.4 MMOL/L (ref 3.5–5.2)
PROT SERPL-MCNC: 7.3 G/DL (ref 6–8.5)
SODIUM SERPL-SCNC: 139 MMOL/L (ref 134–144)

## 2025-05-12 LAB — CCP IGA+IGG SERPL IA-ACNC: <20 UNITS

## 2025-05-15 DIAGNOSIS — E66.9 TYPE 2 DIABETES MELLITUS WITH OBESITY  (HCC): ICD-10-CM

## 2025-05-15 DIAGNOSIS — M79.18 MYOFASCIAL PAIN SYNDROME: ICD-10-CM

## 2025-05-15 DIAGNOSIS — M51.369 BULGING LUMBAR DISC: ICD-10-CM

## 2025-05-15 DIAGNOSIS — E66.811 CLASS 1 OBESITY DUE TO EXCESS CALORIES WITH BODY MASS INDEX (BMI) OF 34.0 TO 34.9 IN ADULT: ICD-10-CM

## 2025-05-15 DIAGNOSIS — F99 INSOMNIA DUE TO OTHER MENTAL DISORDER: ICD-10-CM

## 2025-05-15 DIAGNOSIS — E66.09 CLASS 1 OBESITY DUE TO EXCESS CALORIES WITH BODY MASS INDEX (BMI) OF 34.0 TO 34.9 IN ADULT: ICD-10-CM

## 2025-05-15 DIAGNOSIS — F41.1 GENERALIZED ANXIETY DISORDER: ICD-10-CM

## 2025-05-15 DIAGNOSIS — E11.69 TYPE 2 DIABETES MELLITUS WITH OBESITY  (HCC): ICD-10-CM

## 2025-05-15 DIAGNOSIS — F51.05 INSOMNIA DUE TO OTHER MENTAL DISORDER: ICD-10-CM

## 2025-05-15 RX ORDER — TRAMADOL HYDROCHLORIDE 50 MG/1
50 TABLET ORAL EVERY 6 HOURS PRN
Qty: 120 TABLET | Refills: 0 | Status: CANCELLED | OUTPATIENT
Start: 2025-05-15

## 2025-05-15 RX ORDER — LORAZEPAM 0.5 MG/1
TABLET ORAL
Qty: 60 TABLET | Refills: 0 | Status: SHIPPED | OUTPATIENT
Start: 2025-05-15

## 2025-05-15 RX ORDER — TRAZODONE HYDROCHLORIDE 50 MG/1
100 TABLET ORAL
Qty: 180 TABLET | Refills: 0 | Status: SHIPPED | OUTPATIENT
Start: 2025-05-15

## 2025-05-15 RX ORDER — TRAMADOL HYDROCHLORIDE 50 MG/1
50 TABLET ORAL EVERY 6 HOURS PRN
Qty: 120 TABLET | Refills: 0 | Status: SHIPPED | OUTPATIENT
Start: 2025-05-15

## 2025-05-16 DIAGNOSIS — E66.9 TYPE 2 DIABETES MELLITUS WITH OBESITY  (HCC): Primary | ICD-10-CM

## 2025-05-16 DIAGNOSIS — E66.09 CLASS 1 OBESITY DUE TO EXCESS CALORIES WITH BODY MASS INDEX (BMI) OF 34.0 TO 34.9 IN ADULT: ICD-10-CM

## 2025-05-16 DIAGNOSIS — E11.69 TYPE 2 DIABETES MELLITUS WITH OBESITY  (HCC): Primary | ICD-10-CM

## 2025-05-16 DIAGNOSIS — E66.811 CLASS 1 OBESITY DUE TO EXCESS CALORIES WITH BODY MASS INDEX (BMI) OF 34.0 TO 34.9 IN ADULT: ICD-10-CM

## 2025-05-27 ENCOUNTER — RESULTS FOLLOW-UP (OUTPATIENT)
Dept: FAMILY MEDICINE CLINIC | Facility: CLINIC | Age: 50
End: 2025-05-27

## 2025-05-27 DIAGNOSIS — B35.1 ONYCHOMYCOSIS: Primary | ICD-10-CM

## 2025-05-27 RX ORDER — TERBINAFINE HYDROCHLORIDE 250 MG/1
250 TABLET ORAL DAILY
Qty: 84 TABLET | Refills: 0 | Status: SHIPPED | OUTPATIENT
Start: 2025-05-27 | End: 2025-08-19

## 2025-06-10 DIAGNOSIS — E66.09 CLASS 1 OBESITY DUE TO EXCESS CALORIES WITH BODY MASS INDEX (BMI) OF 34.0 TO 34.9 IN ADULT: ICD-10-CM

## 2025-06-10 DIAGNOSIS — E11.69 TYPE 2 DIABETES MELLITUS WITH OBESITY  (HCC): ICD-10-CM

## 2025-06-10 DIAGNOSIS — E66.9 TYPE 2 DIABETES MELLITUS WITH OBESITY  (HCC): ICD-10-CM

## 2025-06-10 DIAGNOSIS — E66.811 CLASS 1 OBESITY DUE TO EXCESS CALORIES WITH BODY MASS INDEX (BMI) OF 34.0 TO 34.9 IN ADULT: ICD-10-CM

## 2025-06-10 RX ORDER — SEMAGLUTIDE 0.68 MG/ML
INJECTION, SOLUTION SUBCUTANEOUS
Qty: 0 ML | Refills: 5 | OUTPATIENT
Start: 2025-06-10

## 2025-06-17 NOTE — PROGRESS NOTES
Assessment & Plan  Type 2 diabetes mellitus with class 2 obesity  (HCC)    Lab Results   Component Value Date    HGBA1C 7.2 (H) 05/08/2025     Initial weight: 297  Current weight: 282     TBW loss%: 5     Medication: Starting the Ozempic 1mg     Patient understands the importance of making lifestyle changes as recommended below to aid in weight loss.      Dietary Recommendations:  Recommend to avoid skipping any meals. Adequate amount of Macronutrients (minimal 3 meals a day) is necessary to help improve metabolism, satiety and allow for better portion control by decreasing Ghrelin (hunger hormone). Lack of hunger can be suppressed by a hormone called Leptin (full hormone) which can occur from a previous meal or caffeine intake    Protein intake throughout the day can help promote satiety and is necessary for muscle growth/repair    Carbohydrates are essential as it is the vital source of fuel for daily activities. energy, cell function, nutrient absorption, and hormone production.     Fats: Essential vitamins like A, D, and E, support cell growth, function and are necessary for nutrient absorption to support your organs     Fluid intake which is at least half your body weight in ounces is necessary to help control cravings (decreasing confusion for appetite vs water deprivation) as the human body is made up of 50-70% of Fluids. If there is a diversion for water alone, would recommend flavored water (example-splash of lemonade or ice tea) to help promote compliance. Fluids include Teas, water, flavored water, seltzer water, coffee, shakes    Metabolism:    Metabolism can also be promoted by macronutrient intake and increased muscle weight via thermogenesis      Daily Calorie Needs: Recommend to take into account any fluid losses and calories burned via increased activity levels as daily calories may need to be adjusted     Weight check: Weights can fluctuate depending on fluid shifts vs what foods are consumed  "prior to checking your weight          Return in about 19 weeks (around 10/29/2025) for followup .     Most recent notes, labs and previous medical records were reviewed. Total time with chart review and with the patient: 35 min      ______________________________________________________________________        Subjective:     Chief Complaint   Patient presents with    Follow-up     MWM-2M F/u; Waist-51in       HPI: 50 y.o. male with pmh of  hyperlipidemia, sacroiliitis, MDD, type 2 diabetes presents to the weight management clinic for followup      Wt Loss History:   -Onset: Since 16 year olds   -INH - TB in the past   -Phenylpropanolamine-> appetite stimulant; was 400 and dropped to 180-210.   -2017-> [facility]-> 60lbs     Med: Ozempic 0.5     Dietary Regimen:  Breakfast- bagel   Lunch: 10:30-11am->grilled cheese stomach, pancakes   Dinner: [employer]hes: salads, chicken              Cravings: none   Meals Skipped: Breakfast   Fluids: 6-7 cups Water 3-16oz (flavored sticks)      Lifestyle hx:  Exercise: Walking  Live  and Mother   Occupation: [employer], , [employer] part time (5:30-8pm)     Review Of Systems:  General: No pallor  Pulmonary: Negative for shortness of breath  Chest: negative for chest pain  Gastrointestinal:  Negative for vomiting   Psychiatric/Behavioral:  Negative for behavioral problems, confusion, dysphoric mood and hallucinations.    All other systems reviewed and are negative.     Objective:  /78   Pulse 77   Temp 97.6 °F (36.4 °C)   Resp 16   Ht 6' 6\" (1.981 m)   Wt 128 kg (282 lb)   BMI 32.59 kg/m²     Wt Readings from Last 30 Encounters:   06/18/25 128 kg (282 lb)   04/28/25 135 kg (296 lb 12.8 oz)   04/04/25 135 kg (297 lb)   04/03/25 135 kg (297 lb 6.4 oz)   02/07/25 136 kg (299 lb)   01/29/25 (!) 148 kg (326 lb)   01/07/25 (!) 144 kg (318 lb)   12/03/24 (!) 137 kg (302 lb)   11/19/24 (!) 137 kg (302 lb)   11/07/24 135 kg (297 lb) "   10/29/24 135 kg (297 lb 9.6 oz)   04/16/24 135 kg (298 lb 6.4 oz)   10/11/23 (!) 138 kg (304 lb)   06/23/23 136 kg (298 lb 12.8 oz)   05/30/23 132 kg (290 lb)   04/20/23 132 kg (291 lb)   03/02/23 132 kg (290 lb 9.6 oz)   08/30/22 136 kg (298 lb 12.8 oz)   02/24/22 131 kg (288 lb 6.4 oz)   08/18/21 131 kg (288 lb)   02/12/21 132 kg (291 lb 3.2 oz)   08/13/20 132 kg (290 lb)   02/12/20 132 kg (290 lb 6.4 oz)   08/08/19 125 kg (275 lb 9.6 oz)   05/07/18 119 kg (262 lb)   03/19/18 117 kg (259 lb)   01/08/18 118 kg (260 lb)   12/06/17 117 kg (258 lb)   11/06/17 118 kg (260 lb)   10/11/17 118 kg (261 lb)       Physical Exam  Constitutional:       General: No acute distress.  Well-nourished  HENT:      Head: Normocephalic and atraumatic.   Eyes:      Extraocular Movements: Extraocular movements intact.      Conjunctiva/pupils: Conjunctivae normal. Pupils are equal, round  Pulmonary:      Effort: Pulmonary effort is normal. No labored breathing   Neurological:      General: No focal deficit present.  AO x 3     Mental Status: Alert and oriented to person, place, and time. Mental status is at baseline.   Psychiatric:         Mood and Affect: Mood normal.         Behavior: Behavior normal.     Labs and Imaging  Recent labs and imaging have been personally reviewed.

## 2025-06-18 ENCOUNTER — OFFICE VISIT (OUTPATIENT)
Dept: BARIATRICS | Facility: CLINIC | Age: 50
End: 2025-06-18
Payer: COMMERCIAL

## 2025-06-18 VITALS
DIASTOLIC BLOOD PRESSURE: 78 MMHG | RESPIRATION RATE: 16 BRPM | HEART RATE: 77 BPM | WEIGHT: 282 LBS | SYSTOLIC BLOOD PRESSURE: 132 MMHG | BODY MASS INDEX: 32.63 KG/M2 | TEMPERATURE: 97.6 F | HEIGHT: 78 IN

## 2025-06-18 DIAGNOSIS — E11.69 TYPE 2 DIABETES MELLITUS WITH OBESITY  (HCC): Primary | ICD-10-CM

## 2025-06-18 DIAGNOSIS — E66.9 TYPE 2 DIABETES MELLITUS WITH OBESITY  (HCC): Primary | ICD-10-CM

## 2025-06-18 DIAGNOSIS — E66.09 CLASS 1 OBESITY DUE TO EXCESS CALORIES WITH BODY MASS INDEX (BMI) OF 34.0 TO 34.9 IN ADULT: ICD-10-CM

## 2025-06-18 DIAGNOSIS — E66.811 CLASS 1 OBESITY DUE TO EXCESS CALORIES WITH BODY MASS INDEX (BMI) OF 34.0 TO 34.9 IN ADULT: ICD-10-CM

## 2025-06-18 PROCEDURE — 99214 OFFICE O/P EST MOD 30 MIN: CPT | Performed by: STUDENT IN AN ORGANIZED HEALTH CARE EDUCATION/TRAINING PROGRAM

## 2025-07-15 DIAGNOSIS — E66.811 CLASS 1 OBESITY DUE TO EXCESS CALORIES WITH BODY MASS INDEX (BMI) OF 34.0 TO 34.9 IN ADULT: ICD-10-CM

## 2025-07-15 DIAGNOSIS — E66.9 TYPE 2 DIABETES MELLITUS WITH OBESITY  (HCC): ICD-10-CM

## 2025-07-15 DIAGNOSIS — E66.09 CLASS 1 OBESITY DUE TO EXCESS CALORIES WITH BODY MASS INDEX (BMI) OF 34.0 TO 34.9 IN ADULT: ICD-10-CM

## 2025-07-15 DIAGNOSIS — E11.69 TYPE 2 DIABETES MELLITUS WITH OBESITY  (HCC): ICD-10-CM

## 2025-07-15 RX ORDER — SEMAGLUTIDE 0.68 MG/ML
INJECTION, SOLUTION SUBCUTANEOUS
Qty: 6 ML | Refills: 5 | OUTPATIENT
Start: 2025-07-15

## 2025-07-20 DIAGNOSIS — F99 INSOMNIA DUE TO OTHER MENTAL DISORDER: ICD-10-CM

## 2025-07-20 DIAGNOSIS — E78.5 HYPERLIPIDEMIA, UNSPECIFIED HYPERLIPIDEMIA TYPE: ICD-10-CM

## 2025-07-20 DIAGNOSIS — F33.2 SEVERE RECURRENT MAJOR DEPRESSION WITHOUT PSYCHOTIC FEATURES (HCC): ICD-10-CM

## 2025-07-20 DIAGNOSIS — F51.05 INSOMNIA DUE TO OTHER MENTAL DISORDER: ICD-10-CM

## 2025-07-20 DIAGNOSIS — R20.0 NUMBNESS: ICD-10-CM

## 2025-07-20 DIAGNOSIS — M51.369 BULGING LUMBAR DISC: ICD-10-CM

## 2025-07-20 DIAGNOSIS — I10 PRIMARY HYPERTENSION: ICD-10-CM

## 2025-07-20 DIAGNOSIS — M79.18 MYOFASCIAL PAIN SYNDROME: ICD-10-CM

## 2025-07-20 DIAGNOSIS — B35.1 ONYCHOMYCOSIS: ICD-10-CM

## 2025-07-20 DIAGNOSIS — R73.01 IMPAIRED FASTING GLUCOSE: ICD-10-CM

## 2025-07-20 DIAGNOSIS — F41.1 GENERALIZED ANXIETY DISORDER: ICD-10-CM

## 2025-07-23 RX ORDER — TRAMADOL HYDROCHLORIDE 50 MG/1
50 TABLET ORAL EVERY 6 HOURS PRN
Qty: 120 TABLET | Refills: 0 | Status: SHIPPED | OUTPATIENT
Start: 2025-07-23

## 2025-07-23 RX ORDER — ESCITALOPRAM OXALATE 10 MG/1
10 TABLET ORAL DAILY
Qty: 90 TABLET | Refills: 1 | Status: SHIPPED | OUTPATIENT
Start: 2025-07-23

## 2025-07-23 RX ORDER — GABAPENTIN 300 MG/1
300 CAPSULE ORAL 2 TIMES DAILY
Qty: 180 CAPSULE | Refills: 1 | Status: SHIPPED | OUTPATIENT
Start: 2025-07-23

## 2025-07-23 RX ORDER — BUPROPION HYDROCHLORIDE 75 MG/1
75 TABLET ORAL 2 TIMES DAILY
Qty: 180 TABLET | Refills: 1 | Status: SHIPPED | OUTPATIENT
Start: 2025-07-23

## 2025-07-23 RX ORDER — FENOFIBRATE 145 MG/1
145 TABLET, FILM COATED ORAL DAILY
Qty: 90 TABLET | Refills: 1 | Status: SHIPPED | OUTPATIENT
Start: 2025-07-23

## 2025-07-23 RX ORDER — VALSARTAN 160 MG/1
160 TABLET ORAL DAILY
Qty: 90 TABLET | Refills: 1 | Status: SHIPPED | OUTPATIENT
Start: 2025-07-23

## 2025-07-23 RX ORDER — LORAZEPAM 0.5 MG/1
TABLET ORAL
Qty: 60 TABLET | Refills: 0 | Status: SHIPPED | OUTPATIENT
Start: 2025-07-23

## 2025-07-23 RX ORDER — ROSUVASTATIN CALCIUM 5 MG/1
5 TABLET, COATED ORAL DAILY
Qty: 90 TABLET | Refills: 0 | Status: SHIPPED | OUTPATIENT
Start: 2025-07-23

## 2025-07-23 RX ORDER — TRAZODONE HYDROCHLORIDE 50 MG/1
100 TABLET ORAL
Qty: 180 TABLET | Refills: 1 | Status: SHIPPED | OUTPATIENT
Start: 2025-07-23

## 2025-07-23 RX ORDER — TERBINAFINE HYDROCHLORIDE 250 MG/1
250 TABLET ORAL DAILY
Qty: 84 TABLET | Refills: 0 | Status: SHIPPED | OUTPATIENT
Start: 2025-07-23 | End: 2025-10-15